# Patient Record
Sex: FEMALE | Race: WHITE | NOT HISPANIC OR LATINO | Employment: OTHER | URBAN - METROPOLITAN AREA
[De-identification: names, ages, dates, MRNs, and addresses within clinical notes are randomized per-mention and may not be internally consistent; named-entity substitution may affect disease eponyms.]

---

## 2023-01-10 ENCOUNTER — APPOINTMENT (OUTPATIENT)
Dept: MRI IMAGING | Facility: CLINIC | Age: 73
DRG: 542 | End: 2023-01-10
Attending: EMERGENCY MEDICINE
Payer: COMMERCIAL

## 2023-01-10 ENCOUNTER — HOSPITAL ENCOUNTER (INPATIENT)
Facility: CLINIC | Age: 73
LOS: 4 days | Discharge: HOME | DRG: 542 | End: 2023-01-14
Attending: EMERGENCY MEDICINE | Admitting: INTERNAL MEDICINE
Payer: COMMERCIAL

## 2023-01-10 ENCOUNTER — APPOINTMENT (OUTPATIENT)
Dept: CT IMAGING | Facility: CLINIC | Age: 73
DRG: 542 | End: 2023-01-10
Attending: EMERGENCY MEDICINE
Payer: COMMERCIAL

## 2023-01-10 DIAGNOSIS — S32.012A CLOSED UNSTABLE BURST FRACTURE OF FIRST LUMBAR VERTEBRA, INITIAL ENCOUNTER (H): ICD-10-CM
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
## 2023-01-10 LAB
ANION GAP SERPL CALCULATED.3IONS-SCNC: 13 MMOL/L (ref 7–15)
APTT PPP: 29 SECONDS (ref 22–38)
ATRIAL RATE - MUSE: 81 BPM
BASOPHILS # BLD AUTO: 0 10E3/UL (ref 0–0.2)
BASOPHILS NFR BLD AUTO: 0 %
BUN SERPL-MCNC: 25.5 MG/DL (ref 8–23)
CALCIUM SERPL-MCNC: 9.8 MG/DL (ref 8.8–10.2)
CHLORIDE SERPL-SCNC: 102 MMOL/L (ref 98–107)
CREAT SERPL-MCNC: 1.07 MG/DL (ref 0.51–0.95)
DEPRECATED HCO3 PLAS-SCNC: 26 MMOL/L (ref 22–29)
DIASTOLIC BLOOD PRESSURE - MUSE: NORMAL MMHG
EOSINOPHIL # BLD AUTO: 0 10E3/UL (ref 0–0.7)
EOSINOPHIL NFR BLD AUTO: 0 %
ERYTHROCYTE [DISTWIDTH] IN BLOOD BY AUTOMATED COUNT: 12.3 % (ref 10–15)
GFR SERPL CREATININE-BSD FRML MDRD: 55 ML/MIN/1.73M2
GLUCOSE SERPL-MCNC: 118 MG/DL (ref 70–99)
HCT VFR BLD AUTO: 37.5 % (ref 35–47)
HGB BLD-MCNC: 12.2 G/DL (ref 11.7–15.7)
HOLD SPECIMEN: NORMAL
IMM GRANULOCYTES # BLD: 0 10E3/UL
IMM GRANULOCYTES NFR BLD: 0 %
INR PPP: 1.02 (ref 0.85–1.15)
INTERPRETATION ECG - MUSE: NORMAL
LYMPHOCYTES # BLD AUTO: 1 10E3/UL (ref 0.8–5.3)
LYMPHOCYTES NFR BLD AUTO: 12 %
MCH RBC QN AUTO: 32.1 PG (ref 26.5–33)
MCHC RBC AUTO-ENTMCNC: 32.5 G/DL (ref 31.5–36.5)
MCV RBC AUTO: 99 FL (ref 78–100)
MONOCYTES # BLD AUTO: 0.7 10E3/UL (ref 0–1.3)
MONOCYTES NFR BLD AUTO: 8 %
NEUTROPHILS # BLD AUTO: 7.1 10E3/UL (ref 1.6–8.3)
NEUTROPHILS NFR BLD AUTO: 80 %
NRBC # BLD AUTO: 0 10E3/UL
NRBC BLD AUTO-RTO: 0 /100
P AXIS - MUSE: NORMAL DEGREES
PLATELET # BLD AUTO: 244 10E3/UL (ref 150–450)
POTASSIUM SERPL-SCNC: 3.9 MMOL/L (ref 3.4–5.3)
PR INTERVAL - MUSE: 146 MS
QRS DURATION - MUSE: 70 MS
QT - MUSE: 404 MS
QTC - MUSE: 469 MS
R AXIS - MUSE: -22 DEGREES
RBC # BLD AUTO: 3.8 10E6/UL (ref 3.8–5.2)
SODIUM SERPL-SCNC: 141 MMOL/L (ref 136–145)
SYSTOLIC BLOOD PRESSURE - MUSE: NORMAL MMHG
T AXIS - MUSE: 152 DEGREES
TROPONIN T SERPL HS-MCNC: 19 NG/L
VENTRICULAR RATE- MUSE: 81 BPM
WBC # BLD AUTO: 8.8 10E3/UL (ref 4–11)

## 2023-01-10 PROCEDURE — 85025 COMPLETE CBC W/AUTO DIFF WBC: CPT | Performed by: EMERGENCY MEDICINE

## 2023-01-10 PROCEDURE — 85610 PROTHROMBIN TIME: CPT | Performed by: EMERGENCY MEDICINE

## 2023-01-10 PROCEDURE — 96361 HYDRATE IV INFUSION ADD-ON: CPT

## 2023-01-10 PROCEDURE — 74177 CT ABD & PELVIS W/CONTRAST: CPT

## 2023-01-10 PROCEDURE — 258N000003 HC RX IP 258 OP 636: Performed by: EMERGENCY MEDICINE

## 2023-01-10 PROCEDURE — 36415 COLL VENOUS BLD VENIPUNCTURE: CPT | Performed by: EMERGENCY MEDICINE

## 2023-01-10 PROCEDURE — 250N000009 HC RX 250: Performed by: EMERGENCY MEDICINE

## 2023-01-10 PROCEDURE — 72131 CT LUMBAR SPINE W/O DYE: CPT

## 2023-01-10 PROCEDURE — 250N000011 HC RX IP 250 OP 636: Performed by: EMERGENCY MEDICINE

## 2023-01-10 PROCEDURE — 84484 ASSAY OF TROPONIN QUANT: CPT | Performed by: EMERGENCY MEDICINE

## 2023-01-10 PROCEDURE — 82374 ASSAY BLOOD CARBON DIOXIDE: CPT | Performed by: EMERGENCY MEDICINE

## 2023-01-10 PROCEDURE — 85730 THROMBOPLASTIN TIME PARTIAL: CPT | Performed by: EMERGENCY MEDICINE

## 2023-01-10 PROCEDURE — 250N000013 HC RX MED GY IP 250 OP 250 PS 637: Performed by: EMERGENCY MEDICINE

## 2023-01-10 PROCEDURE — G1010 CDSM STANSON: HCPCS

## 2023-01-10 PROCEDURE — 82947 ASSAY GLUCOSE BLOOD QUANT: CPT | Performed by: EMERGENCY MEDICINE

## 2023-01-10 PROCEDURE — 250N000011 HC RX IP 250 OP 636

## 2023-01-10 PROCEDURE — 72125 CT NECK SPINE W/O DYE: CPT

## 2023-01-10 PROCEDURE — 99285 EMERGENCY DEPT VISIT HI MDM: CPT | Mod: 25

## 2023-01-10 PROCEDURE — 70450 CT HEAD/BRAIN W/O DYE: CPT

## 2023-01-10 PROCEDURE — 120N000001 HC R&B MED SURG/OB

## 2023-01-10 PROCEDURE — 96374 THER/PROPH/DIAG INJ IV PUSH: CPT | Mod: 59

## 2023-01-10 PROCEDURE — 93005 ELECTROCARDIOGRAM TRACING: CPT

## 2023-01-10 PROCEDURE — C9803 HOPD COVID-19 SPEC COLLECT: HCPCS

## 2023-01-10 RX ORDER — LORAZEPAM 2 MG/ML
0.5 INJECTION INTRAMUSCULAR ONCE
Status: COMPLETED | OUTPATIENT
Start: 2023-01-10 | End: 2023-01-10

## 2023-01-10 RX ORDER — SODIUM CHLORIDE 9 MG/ML
INJECTION, SOLUTION INTRAVENOUS CONTINUOUS
Status: DISCONTINUED | OUTPATIENT
Start: 2023-01-10 | End: 2023-01-13

## 2023-01-10 RX ORDER — LORAZEPAM 2 MG/ML
INJECTION INTRAMUSCULAR
Status: COMPLETED
Start: 2023-01-10 | End: 2023-01-10

## 2023-01-10 RX ORDER — LEVOTHYROXINE SODIUM 112 UG/1
112 TABLET ORAL DAILY
COMMUNITY
Start: 2022-02-07

## 2023-01-10 RX ORDER — QUETIAPINE FUMARATE 25 MG/1
25 TABLET, FILM COATED ORAL ONCE
Status: COMPLETED | OUTPATIENT
Start: 2023-01-10 | End: 2023-01-10

## 2023-01-10 RX ORDER — QUETIAPINE FUMARATE 25 MG/1
25 TABLET, FILM COATED ORAL EVERY MORNING
Status: ON HOLD | COMMUNITY
End: 2023-01-14

## 2023-01-10 RX ORDER — IOPAMIDOL 755 MG/ML
500 INJECTION, SOLUTION INTRAVASCULAR ONCE
Status: COMPLETED | OUTPATIENT
Start: 2023-01-10 | End: 2023-01-10

## 2023-01-10 RX ADMIN — SODIUM CHLORIDE: 9 INJECTION, SOLUTION INTRAVENOUS at 23:54

## 2023-01-10 RX ADMIN — SODIUM CHLORIDE 56 ML: 9 INJECTION, SOLUTION INTRAVENOUS at 16:58

## 2023-01-10 RX ADMIN — LORAZEPAM 0.5 MG: 2 INJECTION INTRAMUSCULAR; INTRAVENOUS at 19:28

## 2023-01-10 RX ADMIN — SODIUM CHLORIDE 1000 ML: 9 INJECTION, SOLUTION INTRAVENOUS at 15:00

## 2023-01-10 RX ADMIN — IOPAMIDOL 65 ML: 755 INJECTION, SOLUTION INTRAVENOUS at 16:58

## 2023-01-10 RX ADMIN — LORAZEPAM 0.5 MG: 2 INJECTION INTRAMUSCULAR at 19:49

## 2023-01-10 RX ADMIN — QUETIAPINE FUMARATE 25 MG: 25 TABLET ORAL at 18:42

## 2023-01-10 RX ADMIN — LORAZEPAM 0.5 MG: 2 INJECTION INTRAMUSCULAR; INTRAVENOUS at 19:49

## 2023-01-10 ASSESSMENT — ACTIVITIES OF DAILY LIVING (ADL)
ADLS_ACUITY_SCORE: 35

## 2023-01-10 ASSESSMENT — ENCOUNTER SYMPTOMS
NUMBNESS: 0
CONFUSION: 1
ABDOMINAL PAIN: 0
WEAKNESS: 0
BACK PAIN: 1

## 2023-01-10 NOTE — ED NOTES
Rapid Assessment Note    History:   The patient's  reports that 4 days ago the patient suffered a fall in their bathroom that he did not witness. He states that the patient mentioned to him that she had fallen and that she felt sore. He explains that then this morning he went into their bedroom where he had seen the patient sleeping yesterday night and found her on the floor alongside of the bed and notes that she was incontinent of urine. He denies any use of a blood thinner in the patient. He reports a history of dementia in the patient. The patient complains of pain in her lower back bilaterally. She does not recall suffering any falls. She denies shortness of breath, difficulty walking, or chest pain.     Exam:       HEENT:   No scalp hematoma or defect to the bony calvarium.      Kerns's and Racoon's sign negative.        Oropharynx is moist, without lesions or trismus.  EYES:  Conjunctiva normal,  NECK:   C-spine non-tender     No bony step-off to cervical spine.   CV:    Regular rate and rhythm.     No murmurs, rubs or gallops.    PULM:  No respiratory distress.      No stridor  ABD:   Soft, non-tender, non-distended.      No rebound or guarding.  MSK:    No focal bony tenderness to the extremities.      Mild tenderness to the low back  LYMPH:  No cervical lymphadenopathy.  NEURO:  Alert    CN II-XII intact, speech is clear with no aphasia.      Normal muscular tone, no tremor.  SKIN:   Warm, dry and intact.    PSYCH:   Mood is good and affect is appropriate.      Plan of Care:   I evaluated the patient and developed an initial plan of care. I discussed this plan and explained that I, or one of my partners, would be returning to complete the evaluation.     I, Mynor Calderon, am serving as a scribe to document services personally performed by Artemio Rahman MD, based on my observations and the provider's statements to me.    11/5/2018  [employer]    Portions of this medical record were completed by a scribe. UPON MY REVIEW AND AUTHENTICATION BY ELECTRONIC SIGNATURE, this confirms (a) I performed the applicable clinical services, and (b) the record is accurate.      Artemoi Rahman MD  01/10/23 0457

## 2023-01-10 NOTE — ED TRIAGE NOTES
Pt presents for complaint of a fall that occurred on Friday and last night.  who acts as care giver states the fall Friday he was unaware of until informed by the patient. This morning he found the patient lying on the floor next to the bed unable to get up. Hx of dementia. Denies blood thinners. Pt complains of back pain. Alert, Oriented to self. ABC intact.     Triage Assessment     Row Name 01/10/23 5724       Triage Assessment (Adult)    Airway WDL WDL       Respiratory WDL    Respiratory WDL WDL       Skin Circulation/Temperature WDL    Skin Circulation/Temperature WDL WDL       Cardiac WDL    Cardiac WDL WDL       Peripheral/Neurovascular WDL    Peripheral Neurovascular WDL WDL       Cognitive/Neuro/Behavioral WDL    Cognitive/Neuro/Behavioral WDL WDL

## 2023-01-10 NOTE — ED PROVIDER NOTES
History     Chief Complaint:  Fall       HPI   Elsa Jcakson is a 72 year old female who presents for evaluation after a fall. The patient's  reports that 4 days ago the patient suffered a fall in the bathroom that he did not witness. He states that the patient told him that she had fallen and she felt sore in the lower back. He then explains this morning he found her unresponsive and confused on the floor next to the bed and also incontinent of urine, which is abnormal for her. He then reports that it took the patient a few minutes to regain responsiveness. The patient currently denies pain in the abdominal area or ribs. No new numbness or weakness. The patient affirms that she is not on any blood thinners. The patient also claims she has had no recent sicknesses. Of note, the patient takes Seroquel and the last dose was the morning.    Independent Historian: Patient and patient's      Review of External Notes: I reviewed the patient's Care Everywhere and Chart Review     ROS:  Review of Systems   Gastrointestinal: Negative for abdominal pain.   Genitourinary:        (+) incontinent of urine, since resolved   Musculoskeletal: Positive for back pain.   Neurological: Negative for weakness and numbness.   Psychiatric/Behavioral: Positive for confusion (since resolved).   All other systems reviewed and are negative.    Allergies:  No Known Allergies     Medications:    Levothyroxine  Seroquel    Past Medical History:    Synovial cyst of lumbar facet joint  Colon polyp  Osteoporosis  Hypothyroidism  Dorsopathy   Carpal tunnel syndrome  Osteoarthritis    Past Surgical History:    Heart disease - father  Hypertension - mother     Family History:    Heart disease - father  Hypertension - mother    Social History:  Patient came from home.  Patient is accompanied in the ED by her spouse.       Physical Exam     Patient Vitals for the past 24 hrs:   BP Temp Pulse Resp SpO2 Height Weight   01/10/23 1836 (!)  "144/86 -- 82 18 97 % -- --   01/10/23 1821 -- -- 85 17 98 % -- --   01/10/23 1806 -- -- 80 15 97 % -- --   01/10/23 1800 (!) 152/91 -- 82 14 -- -- --   01/10/23 1721 -- -- -- -- 99 % -- --   01/10/23 1706 (!) 169/91 -- -- -- -- -- --   01/10/23 1154 115/81 98  F (36.7  C) 97 16 98 % 1.702 m (5' 7\") 59 kg (130 lb)        Physical Exam  Constitutional:       General: She is not in acute distress.     Appearance: She is not diaphoretic.   HENT:      Head: Atraumatic.      Mouth/Throat:      Pharynx: No oropharyngeal exudate.   Eyes:      General: No scleral icterus.     Pupils: Pupils are equal, round, and reactive to light.   Neck:      Comments: No midline C-spine tenderness  Cardiovascular:      Rate and Rhythm: Normal rate and regular rhythm.      Heart sounds: Normal heart sounds.   Pulmonary:      Effort: No respiratory distress.      Breath sounds: Normal breath sounds.   Abdominal:      General: Bowel sounds are normal.      Palpations: Abdomen is soft.      Tenderness: There is no abdominal tenderness.   Musculoskeletal:      Comments: No thoracic tenderness.  There is mild upper lumbar tenderness without definite step-off.   Skin:     General: Skin is warm.      Capillary Refill: Capillary refill takes less than 2 seconds.      Findings: No rash.   Neurological:      Comments: The patient is alert and oriented to person.  She was ambulating into the exam room.  Strength 5 out of 5 and equal bilaterally in all extremities.  Sensation light touch preserved over all extremities.   Psychiatric:      Comments: Cooperative           Emergency Department Course   ECG  ECG obtained at 1225, ECG read at 1920  Normal sinus rhythm  Low voltage QRS  Nonspecific ST wave abnormality  Abnormal ECG  Rate 81 bpm. DC interval 146 ms. QRS duration 70 ms. QT/QTc 404/469 ms. P-R-T axes * -22 152.    Imaging:  Thoracic spine MRI w/o contrast   Final Result   IMPRESSION:   1.  Limited examination secondary to patient cooperation. "   2.  Acute L1 burst type fracture. By definition this fracture is unstable.   3.  Chronic T8 compression fracture.      CT Chest/Abdomen/Pelvis w Contrast   Final Result   IMPRESSION:   1.  Acute fracture of L1 with greater than 50% central height loss and some retropulsion of posterior fragments with resultant spinal canal narrowing. No definite extension into the posterior elements.   2.  Additional compression deformities of T8 and L3, favor chronic but recommend correlation with point tenderness.   3.  No additional evidence of acute trauma in the chest, abdomen or pelvis.      CT Cervical Spine w/o Contrast   Final Result   IMPRESSION:   1.  No fracture or posttraumatic subluxation.   2.  No high-grade spinal canal or neural foraminal stenosis.      Lumbar spine CT w/o contrast   Final Result   IMPRESSION:     1. Acute L1 burst fracture with fragment retropulsion contributing to   mild spinal canal stenosis.   2. Old L3 compression fracture with mild height loss.   3. Multilevel degenerative change.   4. No high-grade stenoses.   5. Nonspecific fluid attenuation along the right hemidiaphragm with   multiple small foci of gas, potentially posttraumatic   paraspinal/diaphragmatic hematoma or complex pleural fluid collection.   Infection not excluded. Chest CT correlation could be considered.      JESSICA BARRAGAN MD            SYSTEM ID:  [id number]      Head CT w/o contrast   Final Result   IMPRESSION: No acute intracranial abnormality.      JESSICA BARRAGAN MD            SYSTEM ID:  [id number]      Lumbar spine MRI w/o contrast    (Results Pending)      Report per radiology    Laboratory:  Labs Ordered and Resulted from Time of ED Arrival to Time of ED Departure   BASIC METABOLIC PANEL - Abnormal       Result Value    Sodium 141      Potassium 3.9      Chloride 102      Carbon Dioxide (CO2) 26      Anion Gap 13      Urea Nitrogen 25.5 (*)     Creatinine 1.07 (*)     Calcium 9.8      Glucose 118 (*)     GFR  Estimate 55 (*)    TROPONIN T, HIGH SENSITIVITY - Abnormal    Troponin T, High Sensitivity 19 (*)    INR - Normal    INR 1.02     PARTIAL THROMBOPLASTIN TIME - Normal    aPTT 29     CBC WITH PLATELETS AND DIFFERENTIAL    WBC Count 8.8      RBC Count 3.80      Hemoglobin 12.2      Hematocrit 37.5      MCV 99      MCH 32.1      MCHC 32.5      RDW 12.3      Platelet Count 244      % Neutrophils 80      % Lymphocytes 12      % Monocytes 8      % Eosinophils 0      % Basophils 0      % Immature Granulocytes 0      NRBCs per 100 WBC 0      Absolute Neutrophils 7.1      Absolute Lymphocytes 1.0      Absolute Monocytes 0.7      Absolute Eosinophils 0.0      Absolute Basophils 0.0      Absolute Immature Granulocytes 0.0      Absolute NRBCs 0.0     ROUTINE UA WITH MICROSCOPIC REFLEX TO CULTURE        Emergency Department Course & Assessments:         Interventions:  1500 NS 2 L IV  1842 Seroquel 25 mg PO  1928 Ativan 0.5 mg IV  1949 Ativan 0.5 mg IV    Consultations/Discussion of Management or Tests:  1630 I obtained history and examined the patient as noted above.  1837 I rechecked and updated the patient.    2154 I spoke with Suad Caldwell PA-C with neurosurgery about the patient's history and plan of care.   2216 I spoke with Suad Caldwell PA-C with neurosurgery again to receive an update. She recommended that the patient should be admitted here.          Disposition:  The patient was admitted to the hospital under the care of Dr. Vasquez.     Impression & Plan      Medical Decision Making:  This patient is a 72-year-old woman who presents following a fall.  She has dementia and is not able to provide much history herself.  Her  provides history though.  She complains of pain in her low back.  She had episode of urinary incontinence this morning but otherwise does not have any focal neurologic deficit.  CT scan concerning for an unstable burst fracture at L1.  There is also concern for possible pleural fluid.   Partial trauma activation was called.  CT scan of the head and C-spine negative.  CT scan of the chest, abdomen, and pelvis do not show any other traumatic sequelae.    The patient ultimately needed MRI to evaluate the spinal cord for possible compression.  She was pretreated with Seroquel which she uses at home.  She poorly tolerated the MRI.  She required 2 doses of lorazepam in order to facilitate the imaging that was obtained.  This was limited but we were able to get good visualization of the fracture site.  This was discussed with neurosurgery who did not feel that the patient is unstable to the point that she required emergent surgery.  She will be kept on spinal precautions tonight.  She will have a brace applied tomorrow and formal neurosurgery consultation.        Diagnosis:    ICD-10-CM    1. Closed unstable burst fracture of first lumbar vertebra, initial encounter (H)  S32.012A       2. Fall, initial encounter  W19.XXXA               Scribe Disclosure:  I, Stanislaw Mariano, am serving as a scribe at 4:39 PM on 1/10/2023 to document services personally performed by Geo Godinez MD based on my observations and the provider's statements to me.     1/10/2023   Geo Godinez MD McRoberts, Sean Edward, MD  01/10/23 0499

## 2023-01-11 ENCOUNTER — APPOINTMENT (OUTPATIENT)
Dept: GENERAL RADIOLOGY | Facility: CLINIC | Age: 73
DRG: 542 | End: 2023-01-11
Attending: PHYSICIAN ASSISTANT
Payer: COMMERCIAL

## 2023-01-11 LAB
ALBUMIN UR-MCNC: NEGATIVE MG/DL
APPEARANCE UR: CLEAR
BILIRUB UR QL STRIP: NEGATIVE
COLOR UR AUTO: ABNORMAL
GLUCOSE UR STRIP-MCNC: NEGATIVE MG/DL
HGB UR QL STRIP: NEGATIVE
KETONES UR STRIP-MCNC: 20 MG/DL
LEUKOCYTE ESTERASE UR QL STRIP: NEGATIVE
NITRATE UR QL: NEGATIVE
PH UR STRIP: 6.5 [PH] (ref 5–7)
RBC URINE: 1 /HPF
SARS-COV-2 RNA RESP QL NAA+PROBE: NEGATIVE
SP GR UR STRIP: 1.01 (ref 1–1.03)
SQUAMOUS EPITHELIAL: <1 /HPF
TROPONIN T SERPL HS-MCNC: 16 NG/L
TROPONIN T SERPL HS-MCNC: 17 NG/L
UROBILINOGEN UR STRIP-MCNC: NORMAL MG/DL
WBC URINE: <1 /HPF

## 2023-01-11 PROCEDURE — 36415 COLL VENOUS BLD VENIPUNCTURE: CPT | Performed by: INTERNAL MEDICINE

## 2023-01-11 PROCEDURE — 99222 1ST HOSP IP/OBS MODERATE 55: CPT | Performed by: PHYSICIAN ASSISTANT

## 2023-01-11 PROCEDURE — 84484 ASSAY OF TROPONIN QUANT: CPT | Performed by: INTERNAL MEDICINE

## 2023-01-11 PROCEDURE — U0005 INFEC AGEN DETEC AMPLI PROBE: HCPCS | Performed by: INTERNAL MEDICINE

## 2023-01-11 PROCEDURE — 81001 URINALYSIS AUTO W/SCOPE: CPT | Performed by: EMERGENCY MEDICINE

## 2023-01-11 PROCEDURE — 99223 1ST HOSP IP/OBS HIGH 75: CPT | Mod: AI | Performed by: INTERNAL MEDICINE

## 2023-01-11 PROCEDURE — 258N000003 HC RX IP 258 OP 636: Performed by: EMERGENCY MEDICINE

## 2023-01-11 PROCEDURE — 120N000001 HC R&B MED SURG/OB

## 2023-01-11 PROCEDURE — 250N000013 HC RX MED GY IP 250 OP 250 PS 637: Performed by: HOSPITALIST

## 2023-01-11 PROCEDURE — 99207 PR NO BILLABLE SERVICE THIS VISIT: CPT | Performed by: INTERNAL MEDICINE

## 2023-01-11 PROCEDURE — 72100 X-RAY EXAM L-S SPINE 2/3 VWS: CPT

## 2023-01-11 PROCEDURE — 250N000013 HC RX MED GY IP 250 OP 250 PS 637: Performed by: INTERNAL MEDICINE

## 2023-01-11 PROCEDURE — L0464 TLSO 4MOD SACRO-SCAP PRE: HCPCS

## 2023-01-11 RX ORDER — LEVOTHYROXINE SODIUM 112 UG/1
112 TABLET ORAL DAILY
Status: DISCONTINUED | OUTPATIENT
Start: 2023-01-11 | End: 2023-01-14 | Stop reason: HOSPADM

## 2023-01-11 RX ORDER — KETOROLAC TROMETHAMINE 15 MG/ML
15 INJECTION, SOLUTION INTRAMUSCULAR; INTRAVENOUS EVERY 6 HOURS PRN
Status: DISCONTINUED | OUTPATIENT
Start: 2023-01-11 | End: 2023-01-14 | Stop reason: HOSPADM

## 2023-01-11 RX ORDER — HALOPERIDOL 5 MG/ML
2 INJECTION INTRAMUSCULAR EVERY 6 HOURS PRN
Status: DISCONTINUED | OUTPATIENT
Start: 2023-01-11 | End: 2023-01-14 | Stop reason: HOSPADM

## 2023-01-11 RX ORDER — QUETIAPINE FUMARATE 25 MG/1
25 TABLET, FILM COATED ORAL EVERY MORNING
Status: DISCONTINUED | OUTPATIENT
Start: 2023-01-11 | End: 2023-01-13

## 2023-01-11 RX ORDER — LIDOCAINE 40 MG/G
CREAM TOPICAL
Status: DISCONTINUED | OUTPATIENT
Start: 2023-01-11 | End: 2023-01-14 | Stop reason: HOSPADM

## 2023-01-11 RX ORDER — ACETAMINOPHEN 325 MG/1
650 TABLET ORAL EVERY 6 HOURS PRN
Status: DISCONTINUED | OUTPATIENT
Start: 2023-01-11 | End: 2023-01-14 | Stop reason: HOSPADM

## 2023-01-11 RX ORDER — LORAZEPAM 2 MG/ML
0.5 INJECTION INTRAMUSCULAR EVERY 6 HOURS PRN
Status: DISCONTINUED | OUTPATIENT
Start: 2023-01-11 | End: 2023-01-14 | Stop reason: HOSPADM

## 2023-01-11 RX ORDER — LIDOCAINE 4 G/G
1 PATCH TOPICAL
Status: DISCONTINUED | OUTPATIENT
Start: 2023-01-11 | End: 2023-01-14 | Stop reason: HOSPADM

## 2023-01-11 RX ORDER — ACETAMINOPHEN 650 MG/1
650 SUPPOSITORY RECTAL EVERY 6 HOURS PRN
Status: DISCONTINUED | OUTPATIENT
Start: 2023-01-11 | End: 2023-01-14 | Stop reason: HOSPADM

## 2023-01-11 RX ORDER — AMLODIPINE BESYLATE 5 MG/1
5 TABLET ORAL DAILY
Status: DISCONTINUED | OUTPATIENT
Start: 2023-01-11 | End: 2023-01-14 | Stop reason: HOSPADM

## 2023-01-11 RX ADMIN — QUETIAPINE FUMARATE 25 MG: 25 TABLET ORAL at 09:18

## 2023-01-11 RX ADMIN — LIDOCAINE 1 PATCH: 246 PATCH TOPICAL at 09:18

## 2023-01-11 RX ADMIN — LEVOTHYROXINE SODIUM 112 MCG: 0.11 TABLET ORAL at 09:22

## 2023-01-11 RX ADMIN — AMLODIPINE BESYLATE 5 MG: 5 TABLET ORAL at 20:04

## 2023-01-11 RX ADMIN — SODIUM CHLORIDE: 9 INJECTION, SOLUTION INTRAVENOUS at 09:17

## 2023-01-11 ASSESSMENT — ACTIVITIES OF DAILY LIVING (ADL)
ADLS_ACUITY_SCORE: 46
ADLS_ACUITY_SCORE: 47
ADLS_ACUITY_SCORE: 38
ADLS_ACUITY_SCORE: 46
DOING_ERRANDS_INDEPENDENTLY_DIFFICULTY: YES
CHANGE_IN_FUNCTIONAL_STATUS_SINCE_ONSET_OF_CURRENT_ILLNESS/INJURY: YES
ADLS_ACUITY_SCORE: 47
ADLS_ACUITY_SCORE: 47
FALL_HISTORY_WITHIN_LAST_SIX_MONTHS: YES
ADLS_ACUITY_SCORE: 47
BATHING: 1-->ASSISTANCE NEEDED
DRESS: 1-->ASSISTANCE (EQUIPMENT/PERSON) NEEDED
DRESSING/BATHING_DIFFICULTY: YES
ADLS_ACUITY_SCORE: 47
ADLS_ACUITY_SCORE: 47
DRESS: 1-->ASSISTANCE (EQUIPMENT/PERSON) NEEDED (NOT DEVELOPMENTALLY APPROPRIATE)
ADLS_ACUITY_SCORE: 48
ADLS_ACUITY_SCORE: 47
WALKING_OR_CLIMBING_STAIRS_DIFFICULTY: YES
DIFFICULTY_EATING/SWALLOWING: NO
TRANSFERRING: 1-->ASSISTANCE (EQUIPMENT/PERSON) NEEDED
WALKING_OR_CLIMBING_STAIRS: AMBULATION DIFFICULTY, REQUIRES EQUIPMENT;AMBULATION DIFFICULTY, ASSISTANCE 1 PERSON;STAIR CLIMBING DIFFICULTY, REQUIRES EQUIPMENT;STAIR CLIMBING DIFFICULTY, ASSISTANCE 1 PERSON;TRANSFERRING DIFFICULTY, REQUIRES EQUIPMENT;TRANSFERRING DIFFICULTY, ASSISTANCE 1 PERSON
TRANSFERRING: 1-->ASSISTANCE (EQUIPMENT/PERSON) NEEDED (NOT DEVELOPMENTALLY APPROPRIATE)
DRESSING/BATHING: BATHING DIFFICULTY, ASSISTANCE 1 PERSON;DRESSING DIFFICULTY, ASSISTANCE 1 PERSON
ADLS_ACUITY_SCORE: 35
CONCENTRATING,_REMEMBERING_OR_MAKING_DECISIONS_DIFFICULTY: YES

## 2023-01-11 NOTE — PROGRESS NOTES
Contacted regarding 73 yo female presenting to ER after fall 4 days ago and second fall today with complaint of back pain. Neuro intact per ER.  Imaging reveals L1 fracture.    Imaging:    CT lumbar spine:  FINDINGS: Acute L1 burst fracture with fragment retropulsion  contributing to mild spinal canal stenosis. No pedicle or posterior  element extension is identified.     Chronic-appearing L3 compression fracture with mild height loss and  superior endplate Schmorl's node.     No other fractures are identified. Multilevel mild degenerative disc  disease. Moderate to severe lower lumbar spine facet arthropathy.  Grade 1 anterolisthesis of L5 on S1. No high grade spinal canal or  foraminal stenoses. Mild spinal canal stenosis at L4-L5.     Bilateral sacroiliac joint degenerative change. Sigmoid  diverticulosis. Scattered vascular calcifications.     Nonspecific fluid attenuation along the right hemidiaphragm with  multiple small foci of gas.                                                                      IMPRESSION:    1. Acute L1 burst fracture with fragment retropulsion contributing to  mild spinal canal stenosis.  2. Old L3 compression fracture with mild height loss.  3. Multilevel degenerative change.  4. No high-grade stenoses.  5. Nonspecific fluid attenuation along the right hemidiaphragm with  multiple small foci of gas, potentially posttraumatic  paraspinal/diaphragmatic hematoma or complex pleural fluid collection.  Infection not excluded. Chest CT correlation could be considered.     MRI thoracic spine:  FINDINGS:   Normal alignment of the thoracic spine. There is acute burst type fracture at L1 with involvement of the anterior vertebral margin, posterior vertebral margin, superior endplate, and inferior endplate. There is 5 mm retropulsion. No associated spinal   canal or neural foraminal stenosis. No identified epidural hematoma. There is chronic superior endplate compression fracture at T8. Normal  disc heights. No herniation. Normal facets. No spinal canal or neural foraminal stenosis. No abnormal cord signal.      No extraspinal abnormality.                                                                      IMPRESSION:  1.  Limited examination secondary to patient cooperation.  2.  Acute L1 burst type fracture. By definition this fracture is unstable.  3.  Chronic T8 compression fracture.    RECOMMENDATIONS:  No immediate operative NS intervention indicated.  Admit at [facility].  Orthotics consult for TLSO.  Bedrest until brace arrives.  Upright lumbar x-ray's in brace.  NS will see in consultation tomorrow.    Discussed with Dr. Youngblood.    Suad Caldwell, ANGEL LUISS  [facility]  [address] [address]    Tel [phone]  Pager [phone]

## 2023-01-11 NOTE — H&P
Admitted: 01/10/2023    CHIEF COMPLAINT:  Fall, low back pain.    HISTORY OF PRESENT ILLNESS:  Mainly obtained from the ER records.  I did attempt to get in touch with her , however, I was unsuccessful.  The patient is quite sedated. This is a 72-year-old white female with a history of dementia that is thought to be either Lewy body or frontotemporal, on Seroquel, hypothyroidism, who was brought in by her  for a fall.  The patient apparently fell 4 days ago in the bathroom that he did not witness.  She had some soreness in the lower back.  Then, this morning she was found to be unresponsive and confused on the floor next to the bed and incontinent of urine, which is abnormal for her.  It took a few minutes for her to regain responsiveness.    PAST MEDICAL HISTORY:  Significant for osteoporosis, hypothyroidism, carpal tunnel syndrome.    FAMILY HISTORY:  Significant for heart disease in her father.    SOCIAL HISTORY:  She lives at home with her .  Unable to obtain further collateral information as the patient is quite sedated.    ALLERGIES:  NO KNOWN DRUG ALLERGIES.    MEDICATIONS:  Levothyroxine, Seroquel.    REVIEW OF SYSTEMS:  Unable to obtain.  The patient is quite sedated, having received 1 mg of Ativan and Seroquel.    PHYSICAL EXAMINATION:    VITAL SIGNS:  Temperature is 98, pulse 82, blood pressure is 144/86, respiratory rate 18, O2 sat is 97%.  GENERAL:  She is sedated.  HEENT:  Her pupils are reactive to light.  LUNGS:  Clear to auscultation anteriorly bilaterally.  CARDIAC:  Regular rate.  S1, S2 normal.  No murmurs or gallops.  ABDOMEN:  Soft, nontender, nondistended.  Hypoactive bowel sounds.  EXTREMITIES:  There is no edema.   NEUROLOGICAL:  Unable to perform as she is heavily sedated.    LABORATORY DATA:  Obtained on her shows the following:  A basic metabolic panel is grossly unremarkable other than a BUN of 25.5 and a creatinine of 1.07.  Her troponin was 19.  A CBC with diff is  grossly unremarkable.  Her INR is 1.02.  She had an EKG, which shows normal sinus rhythm at 81 beats per minute with low voltage QRS and nonspecific T-wave abnormality.  She had numerous imaging studies to include CT of the head without contrast, which showed no acute intracranial abnormality.  CT of the L-spine, which showed an acute L1 burst fracture with fragment retropulsion contributing to mild spinal canal stenosis, old L3 compression fracture with mild height loss, nonspecific fluid attenuation along the right hemidiaphragm with multiple small foci of gas, potential posttraumatic paraspinal or diaphragmatic hematoma, complex pleural fluid collection, infection not excluded.  Chest CT could be considered.  CT of the C-spine, which showed no fracture or subluxation.  CT of the chest, abdomen and pelvis with contrast, which showed acute fracture of L1 with greater than 50% central height loss and some retropulsion of posterior fragments with resultant spinal canal narrowing.  No definite extension into the posterior elements. Additional compression deformities of T8 and L3 favor chronic, but recommend correlation with point tenderness.  No additional evidence of acute trauma in the chest, abdomen and pelvis. MRI of the T spine, which showed acute L1 burst type fracture by definition, this fracture is unstable, chronic T8 compression fracture.    IMPRESSION AND PLAN:    1.  Fall.  Circumstances unknown with an acute L1 burst type fracture.  Neurosurgery will be consulted.  She will be placed on bed rest.  We will use pain medications for pain relief.  Orthopedics will be consulted.  2.  Dementia, complicates the situation given possible reaction to narcotics. Would recommend a sitter.  Fall precautions.  We will use topical as well as oral analgesics.    CODE STATUS:  By default will be FULL CODE until we can get in touch with her .    DISPOSITION:  She will be admitted as an inpatient.    Jarad Vasquez,  MD        D: 01/10/2023   T: 2023   MT: TAMMY    Name:     DOTTIE CHUA  MRN:      [id number]        Account:     [id number]   :      1950           Admitted:    01/10/2023       Document: [id number]

## 2023-01-11 NOTE — PHARMACY-ADMISSION MEDICATION HISTORY
Admission medication history interview status for this patient is complete. See [facility] admission navigator for allergy information, prior to admission medications and immunization status.     Medication history interview done, indicate source(s): Family ()  Medication history resources (including written lists, pill bottles, clinic record):Epic    Changes made to PTA medication list:  Added: all    Prior to Admission medications    Medication Sig Last Dose Taking? Auth Provider Long Term End Date   levothyroxine (SYNTHROID/LEVOTHROID) 112 MCG tablet Take 112 mcg by mouth daily 1/10/2023 at am Yes Unknown, Entered By History Yes    QUEtiapine (SEROQUEL) 25 MG tablet Take 25 mg by mouth every morning 1/10/2023 at am Yes Unknown, Entered By History

## 2023-01-11 NOTE — ED NOTES
[facility]  ED Nurse Handoff Report    Elsa Jackson is a 72 year old female   ED Chief complaint: Fall  . ED Diagnosis:   Final diagnoses:   None     Allergies: No Known Allergies    Code Status: Full Code  Activity level - Baseline/Home:  Independent. Activity Level - Current:   Assist X 1. Lift room needed: No. Bariatric: No   Needed: No   Isolation: No. Infection: Not Applicable.     Vital Signs:   Vitals:    01/10/23 1800 01/10/23 1806 01/10/23 1821 01/10/23 1836   BP: (!) 152/91   (!) 144/86   Pulse: 82 80 85 82   Resp: 14 15 17 18   Temp:       SpO2:  97% 98% 97%   Weight:       Height:           Cardiac Rhythm:  ,      Pain level:    Patient confused: Yes. Patient Falls Risk: Yes.   Elimination Status: Has voided   Patient Report - Initial Complaint: Fall, Back pain. Focused Assessment: .The patient's  reports that 4 days ago the patient suffered a fall in their bathroom that he did not witness. He states that the patient mentioned to him that she had fallen and that she felt sore. He explains that then this morning he went into their bedroom where he had seen the patient sleeping yesterday night and found her on the floor alongside of the bed and notes that she was incontinent of urine. He denies any use of a blood thinner in the patient. He reports a history of dementia in the patient. The patient complains of pain in her lower back bilaterally. She does not recall suffering any falls. She denies shortness of breath, difficulty walking, or chest pain.      Exam:         HEENT:           No scalp hematoma or defect to the bony calvarium.                            Kerns's and Racoon's sign negative.                               Oropharynx is moist, without lesions or trismus.  EYES:  Conjunctiva normal,  NECK:             C-spine non-tender                           No bony step-off to cervical spine.   CV:                  Regular rate and rhythm.                            No murmurs, rubs or gallops.    PULM:             No respiratory distress.                            No stridor  ABD:               Soft, non-tender, non-distended.                            No rebound or guarding.  MSK:               No focal bony tenderness to the extremities.                            Mild tenderness to the low back  LYMPH:          No cervical lymphadenopathy.  NEURO:          Alert                          CN II-XII intact, speech is clear with no aphasia.                            Normal muscular tone, no tremor.  SKIN:               Warm, dry and intact.    PSYCH:           Mood is good and affect is appropriate   Tests Performed: labs, imaging. Abnormal Results:   Labs Ordered and Resulted from Time of ED Arrival to Time of ED Departure   BASIC METABOLIC PANEL - Abnormal       Result Value    Sodium 141      Potassium 3.9      Chloride 102      Carbon Dioxide (CO2) 26      Anion Gap 13      Urea Nitrogen 25.5 (*)     Creatinine 1.07 (*)     Calcium 9.8      Glucose 118 (*)     GFR Estimate 55 (*)    TROPONIN T, HIGH SENSITIVITY - Abnormal    Troponin T, High Sensitivity 19 (*)    INR - Normal    INR 1.02     PARTIAL THROMBOPLASTIN TIME - Normal    aPTT 29     CBC WITH PLATELETS AND DIFFERENTIAL    WBC Count 8.8      RBC Count 3.80      Hemoglobin 12.2      Hematocrit 37.5      MCV 99      MCH 32.1      MCHC 32.5      RDW 12.3      Platelet Count 244      % Neutrophils 80      % Lymphocytes 12      % Monocytes 8      % Eosinophils 0      % Basophils 0      % Immature Granulocytes 0      NRBCs per 100 WBC 0      Absolute Neutrophils 7.1      Absolute Lymphocytes 1.0      Absolute Monocytes 0.7      Absolute Eosinophils 0.0      Absolute Basophils 0.0      Absolute Immature Granulocytes 0.0      Absolute NRBCs 0.0     ROUTINE UA WITH MICROSCOPIC REFLEX TO CULTURE     Thoracic spine MRI w/o contrast   Final Result   IMPRESSION:   1.  Limited examination secondary to patient cooperation.   2.   Acute L1 burst type fracture. By definition this fracture is unstable.   3.  Chronic T8 compression fracture.      CT Chest/Abdomen/Pelvis w Contrast   Final Result   IMPRESSION:   1.  Acute fracture of L1 with greater than 50% central height loss and some retropulsion of posterior fragments with resultant spinal canal narrowing. No definite extension into the posterior elements.   2.  Additional compression deformities of T8 and L3, favor chronic but recommend correlation with point tenderness.   3.  No additional evidence of acute trauma in the chest, abdomen or pelvis.      CT Cervical Spine w/o Contrast   Final Result   IMPRESSION:   1.  No fracture or posttraumatic subluxation.   2.  No high-grade spinal canal or neural foraminal stenosis.      Lumbar spine CT w/o contrast   Final Result   IMPRESSION:     1. Acute L1 burst fracture with fragment retropulsion contributing to   mild spinal canal stenosis.   2. Old L3 compression fracture with mild height loss.   3. Multilevel degenerative change.   4. No high-grade stenoses.   5. Nonspecific fluid attenuation along the right hemidiaphragm with   multiple small foci of gas, potentially posttraumatic   paraspinal/diaphragmatic hematoma or complex pleural fluid collection.   Infection not excluded. Chest CT correlation could be considered.      JESSICA BARRAGAN MD            SYSTEM ID:  [id number]      Head CT w/o contrast   Final Result   IMPRESSION: No acute intracranial abnormality.      JESSICA BARRAGAN MD            SYSTEM ID:  [id number]      Lumbar spine MRI w/o contrast    (Results Pending)   .   Treatments provided: See MAR  Family Comments: Patient has hx of dementia.   OBS brochure/video discussed/provided to patient:  N/A  ED Medications:   Medications   0.9% sodium chloride BOLUS (0 mLs Intravenous Stopped 1/10/23 5642)     Followed by   sodium chloride 0.9% infusion (has no administration in time range)   CT Scan Flush (56 mLs Intravenous Given 1/10/23  1658)   iopamidol (ISOVUE-370) solution 500 mL (65 mLs Intravenous Given 1/10/23 1658)   QUEtiapine (SEROquel) tablet 25 mg (25 mg Oral Given 1/10/23 1842)   LORazepam (ATIVAN) injection 0.5 mg (0.5 mg Intravenous Given 1/10/23 1928)   LORazepam (ATIVAN) injection 0.5 mg (0.5 mg Intravenous Given 1/1949)     Drips infusing:  No  For the majority of the shift, the patient's behavior Green. Interventions performed were NA.    Sepsis treatment initiated: No     Patient tested for COVID 19 prior to admission: YES    ED Nurse Name/Phone Number: Olivia Casillas RN,   11:06 PM    RECEIVING UNIT ED HANDOFF REVIEW    Above ED Nurse Handoff Report was reviewed: Yes  Reviewed by: Kayla Ying RN on January 11, 2023 at 1:43 PM

## 2023-01-11 NOTE — PROVIDER NOTIFICATION
MD paged [phone]:  FYI ED31 BP is still high, 159/107. No PRNs ordered lmk if you'd like me to give anything. Thanks

## 2023-01-11 NOTE — PROGRESS NOTES
"Spouse at bedside this AM. Somnolent but arouses to touch/voice. Bedrest over night. Does not appear to be in pain at rest but with rolling in bed patient does groan. VSS on RA. Lung sounds clear. Purewick in place. Neurosurgery to see.     BP (!) 170/92 (BP Location: Left arm, Patient Position: Semi-Berg's, Cuff Size: Adult Regular)   Pulse 73   Temp 98  F (36.7  C)   Resp 14   Ht 1.702 m (5' 7\")   Wt 59 kg (130 lb)   SpO2 97%   BMI 20.36 kg/m      "

## 2023-01-11 NOTE — CONSULTS
[facility]    Neurosurgery Consultation     Date of Admission:  1/10/2023  Date of Consult (When I saw the patient): 01/11/23    Assessment & Plan   Elsa Jackson is a 72 year old female who was admitted on 1/10/2023. Elsa Jackson is a 72 year old female who presents with multiple falls over the last several days (first fall Friday 1/6 GALEN fall when getting up from toilet landing on buttocks, and another fall 1/10 GALEN fall out of bed with radiographic evidence of acute L1 burst type fracture.    Patient and  provided history and are reliable. Patient notes after her fall Friday she had immediate low back pain and continued back pain. She notes that back increased after her fall yesterday 1/10 prompting visit to ED.  notes he is not sure how she fell out of bed overnight but found her laying on the side of her bed. Per reports,  reported history of wife having dementia. Patient notes low back pain is localized, aggravated with movement and alleviated with laying down and resting. She denies changes in bowel/bladder, radicular sx, paresthesias, weakness. She notes back pain has significantly improved with medications she has received in ED. She is on calcium but denies medication regimen     EXAM: MR THORACIC SPINE W/O CONTRAST  LOCATION: [facility]  DATE/TIME: 1/10/2023 8:01 PM     INDICATION: trauma, fall, low back pain  COMPARISON: None.  TECHNIQUE: Limited examination with coronal T1 and sagittal T1 and T2 sequences. Additional sequences were not obtained secondary to patient's altered mentation and difficulty during scan.     FINDINGS:   Normal alignment of the thoracic spine. There is acute burst type fracture at L1 with involvement of the anterior vertebral margin, posterior vertebral margin, superior endplate, and inferior endplate. There is 5 mm retropulsion. No associated spinal   canal or neural foraminal stenosis. No identified epidural  hematoma. There is chronic superior endplate compression fracture at T8. Normal disc heights. No herniation. Normal facets. No spinal canal or neural foraminal stenosis. No abnormal cord signal.      No extraspinal abnormality.                                                                      IMPRESSION:  1.  Limited examination secondary to patient cooperation.  2.  Acute L1 burst type fracture. By definition this fracture is unstable.  3.  Chronic T8 compression fracture.    Clinical history, imaging and plans reviewed myself as well as with Dr. Youngblood, patient, . No surgical intervention indicated at this time. Plan for TLSO brace, upright XR once in brace, conservative measures with light activity (Please limit your lifting to no more that ten pounds and avoid excessive bending, twisting and turning at the lumbar spine. You should also avoid excessive jostling and jarring activities). Recommend follow up in 6 and 12 weeks with upright XR prior, our office will coordinate these.     I have discussed the following assessment and plan with Dr. Youngblood who is in agreement with the initial plan and will follow up with further consultation recommendations.    Ami Casillas PA-C  [facility] [place] [address]    Tel [phone]  Pager [phone]    Code Status    Full Code    Reason for Consult   Reason for consult: I was asked by Dr. Vasquez to evaluate this patient for L1 fracture.    Primary Care Physician   [facility]    Chief Complaint   Fall x 2 with back pain    History is obtained from the patient, electronic health record, emergency department physician and patient's spouse    History of Present Illness   Elsa Jackson is a 72 year old female who presents with multiple falls over the last several days (first fall Friday 1/6 GALEN fall when getting up from toilet landing on buttocks, and another fall 1/10 GALEN  fall out of bed with radiographic evidence of acute L1 burst type fracture.    Patient and  provided history and are reliable. Patient notes after her fall Friday she had immediate low back pain and continued back pain. She notes that back increased after her fall yesterday 1/10 prompting visit to ED.  notes he is not sure how she fell out of bed overnight but found her laying on the side of her bed. Per reports,  reported history of wife having dementia. Patient notes low back pain is localized, aggravated with movement and alleviated with laying down and resting. She denies changes in bowel/bladder, radicular sx, paresthesias, weakness. She notes back pain has significantly improved with medications she has received in ED. She is on calcium but denies medication regimen     EXAM: MR THORACIC SPINE W/O CONTRAST  LOCATION: [facility]  DATE/TIME: 1/10/2023 8:01 PM     INDICATION: trauma, fall, low back pain  COMPARISON: None.  TECHNIQUE: Limited examination with coronal T1 and sagittal T1 and T2 sequences. Additional sequences were not obtained secondary to patient's altered mentation and difficulty during scan.     FINDINGS:   Normal alignment of the thoracic spine. There is acute burst type fracture at L1 with involvement of the anterior vertebral margin, posterior vertebral margin, superior endplate, and inferior endplate. There is 5 mm retropulsion. No associated spinal   canal or neural foraminal stenosis. No identified epidural hematoma. There is chronic superior endplate compression fracture at T8. Normal disc heights. No herniation. Normal facets. No spinal canal or neural foraminal stenosis. No abnormal cord signal.      No extraspinal abnormality.                                                                      IMPRESSION:  1.  Limited examination secondary to patient cooperation.  2.  Acute L1 burst type fracture. By definition this fracture is unstable.  3.   "Chronic T8 compression fracture.    Past Medical History   I have reviewed this patient's medical history and updated it with pertinent information if needed.   No past medical history on file.    Past Surgical History   I have reviewed this patient's surgical history and updated it with pertinent information if needed.  No past surgical history on file.    Prior to Admission Medications   Prior to Admission Medications   Prescriptions Last Dose Informant Patient Reported? Taking?   QUEtiapine (SEROQUEL) 25 MG tablet 1/10/2023 at am  Yes Yes   Sig: Take 25 mg by mouth every morning   levothyroxine (SYNTHROID/LEVOTHROID) 112 MCG tablet 1/10/2023 at am  Yes Yes   Sig: Take 112 mcg by mouth daily      Facility-Administered Medications: None     Allergies   No Known Allergies    Social History   I have reviewed this patient's social history and updated it with pertinent information if needed. Elsa Jackson      Family History   I have reviewed this patient's family history and updated it with pertinent information if needed.   No family history on file.    Review of Systems    ROS: 10 point ROS neg other than the symptoms noted above in the HPI.    Physical Exam   Temp: 97.8  F (36.6  C) Temp src: Oral BP: (!) 169/91 Pulse: 84   Resp: 16 SpO2: 96 % O2 Device: None (Room air)    Vital Signs with Ranges  Temp:  [97.8  F (36.6  C)-98  F (36.7  C)] 97.8  F (36.6  C)  Pulse:  [73-97] 84  Resp:  [12-18] 16  BP: (115-175)/() 169/91  SpO2:  [93 %-99 %] 96 %  130 lbs 0 oz     , Blood pressure (!) 169/91, pulse 84, temperature 97.8  F (36.6  C), temperature source Oral, resp. rate 16, height 5' 7\" (1.702 m), weight 130 lb (59 kg), SpO2 96 %.  130 lbs 0 oz    NEUROLOGICAL EXAMINATION:   Mental status:  Awake, alert, appropriate, following commands, speech is fluent. Forgetful at times throughout history.  Cranial nerves:  II-XII grossly intact.   Motor:  Strength is 5/5 throughout the upper and lower extremities  Shoulder " Abduction:  Right:  5/5   Left:  5/5  Biceps:                      Right:  5/5   Left:  5/5  Triceps:                     Right:  5/5   Left:  5/5  Wrist Extensors:       Right:  5/5   Left:  5/5  Wrist Flexors:           Right:  5/5   Left:  5/5  interosseus :            Right:  5/5   Left:  5/5   Hip Flexor:                Right: 5/5  Left:  5/5  Hip Adductor:             Right:  5/5  Left:  5/5  Hip Abductor:             Right:  5/5  Left:  5/5  Gastroc Soleus:        Right:  5/5  Left:  5/5  Tib/Ant:                      Right:  5/5  Left:  5/5  EHL:                     Right:  5/5  Left:  5/5  Sensation:  Intact to light touch throughout   Reflexes:   Negative Babinski.  Negative Clonus.      Cervical examination reveals good range of motion.  No tenderness to palpation of the cervical spine or paraspinous muscles bilaterally.     Lumbar examination reveals no tenderness of the spine or paraspinous muscles.  Hip height is symmetrical. Negative SI joint, sciatic notch or greater trochanteric tenderness to palpation bilaterally.  Straight leg raise is negative bilaterally.       Data   All new lab and imaging data was personally reviewed by me.    EXAM: MR THORACIC SPINE W/O CONTRAST  LOCATION: [facility]  DATE/TIME: 1/10/2023 8:01 PM     INDICATION: trauma, fall, low back pain  COMPARISON: None.  TECHNIQUE: Limited examination with coronal T1 and sagittal T1 and T2 sequences. Additional sequences were not obtained secondary to patient's altered mentation and difficulty during scan.     FINDINGS:   Normal alignment of the thoracic spine. There is acute burst type fracture at L1 with involvement of the anterior vertebral margin, posterior vertebral margin, superior endplate, and inferior endplate. There is 5 mm retropulsion. No associated spinal   canal or neural foraminal stenosis. No identified epidural hematoma. There is chronic superior endplate compression fracture at T8. Normal disc  heights. No herniation. Normal facets. No spinal canal or neural foraminal stenosis. No abnormal cord signal.      No extraspinal abnormality.                                                                      IMPRESSION:  1.  Limited examination secondary to patient cooperation.  2.  Acute L1 burst type fracture. By definition this fracture is unstable.  3.  Chronic T8 compression fracture.  CBC RESULTS:   Recent Labs   Lab Test 01/10/23  1155   WBC 8.8   RBC 3.80   HGB 12.2   HCT 37.5   MCV 99   MCH 32.1   MCHC 32.5   RDW 12.3        Basic Metabolic Panel:  Lab Results   Component Value Date     01/10/2023      Lab Results   Component Value Date    POTASSIUM 3.9 01/10/2023     Lab Results   Component Value Date    CHLORIDE 102 01/10/2023     Lab Results   Component Value Date    MYNOR 9.8 01/10/2023     Lab Results   Component Value Date    CO2 26 01/10/2023     Lab Results   Component Value Date    BUN 25.5 01/10/2023     Lab Results   Component Value Date    CR 1.07 01/10/2023     Lab Results   Component Value Date     01/10/2023     INR:  Lab Results   Component Value Date    INR 1.02 01/10/2023

## 2023-01-11 NOTE — PROGRESS NOTES
Fit patient with tlso brace with STEVEN Mckinnon.  Written instructions given.   may have questions.  Contact information left with patient.  Matthias Brown.

## 2023-01-11 NOTE — PLAN OF CARE
"Goal Outcome Evaluation:       VSS ex elevated BP. Neuros intact, pt inconsistent with following commands but equal on both sides. Denies pain, however occasionally says her back \"is her problem area\". Lidocaine patch in place.  at bedside. Fit with orthotic brace, going to Xray before 6th floor. Pt fidgeting in bed after  left room, able to be redirected but did pull IV and purewick out. UA sent. Lunch eaten.     BP (!) 159/107   Pulse 84   Temp 97.8  F (36.6  C) (Oral)   Resp 16   Ht 1.702 m (5' 7\")   Wt 59 kg (130 lb)   SpO2 96%   BMI 20.36 kg/m                     "

## 2023-01-11 NOTE — PROGRESS NOTES
[facility]    Hospitalist Progress Note    Date of Service (when I saw the patient): 01/11/2023    Assessment & Plan   Elsa Jackson is a 72 year old female with PMH of hypothyroidism, osteoarthritis, dementia who was brought to the emergency room by her  after episode of fall and some degree of loss of consciousness or unresponsiveness and was found to have L1 burst type fracture and admitted to hospital on 1/10/2023.   Was also reported the patient apparently fell down 4 days ago in the bathroom that was not witnessed.  Patient complained of pain in her lower back,    1.  Fall suspected mechanical.  2.  Closed L1 burst type fracture.  3.  Chronic T8, L3 compression fracture  3.  Dementia with behavioral disturbance  -Patient is still somewhat confused but easily redirectable.  -Continue close monitoring, risk of fall.  -Neurosurgery consulted recommended nonsurgical conservative measures.  -MRI as reported.  -TLSO brace.  -Activity, PT OT Per neurosurgery.  -Continue Seroquel as needed.  -Ordered Ativan 0.5 mg IV and Haldol to 2mg IV every 6 hours as needed for agitation.  -Call MD to adjust medication if needed for agitation.    DVT Prophylaxis: Pneumatic Compression Devices.  Code Status: Full Code    Disposition: Expected discharge in 2 days, likely needs placement pending further evaluation and recommendation.  The underlying dementia is complicating her overall Care.    Lee Nagel MD    Interval History   Patient seen and examined, assumed care today, H&P, labs, imaging, medications reviewed by me.  Patient remained confused, easily very redirectable but does not give any detailed history.    -Data reviewed today: I reviewed all new labs and imaging results over the last 24 hours. I personally reviewed .    Physical Exam   Temp: 97.8  F (36.6  C) Temp src: Temporal BP: (!) 175/85 Pulse: 79   Resp: 16 SpO2: 97 % O2 Device: None (Room air)    Vitals:    01/10/23 1154    Weight: 59 kg (130 lb)     Vital Signs with Ranges  Temp:  [97.8  F (36.6  C)] 97.8  F (36.6  C)  Pulse:  [73-87] 79  Resp:  [12-18] 16  BP: (144-175)/() 175/85  SpO2:  [93 %-99 %] 97 %  I/O last 3 completed shifts:  In: 240 [P.O.:240]  Out: 700 [Urine:700]    Constitutional: Awake and does not give any detailed history, redirectable but confused  Respiratory: Good air entry bilaterally, no wheezing crackles or rales.  Cardiovascular: S1 and S2 regular, no gallop or murmur.  GI: Soft, nontender, nondistended, positive bowel sounds.  Skin/Integumen: No rash or exanthems.  Neuror: Awake, moves all extremities, no focal deficits on gross examination.      Medications     sodium chloride Stopped (01/11/23 1202)       levothyroxine  112 mcg Oral Daily     lidocaine  1 patch Transdermal Q24H     lidocaine   Transdermal Q8H LESLEY     QUEtiapine  25 mg Oral QAM     sodium chloride (PF)  3 mL Intracatheter Q8H       Data   Recent Labs   Lab 01/10/23  1155   WBC 8.8   HGB 12.2   MCV 99      INR 1.02      POTASSIUM 3.9   CHLORIDE 102   CO2 26   BUN 25.5*   CR 1.07*   ANIONGAP 13   MYNOR 9.8   *       Recent Results (from the past 24 hour(s))   CT Cervical Spine w/o Contrast    Narrative    EXAM: CT CERVICAL SPINE W/O CONTRAST  LOCATION: [facility]  DATE/TIME: 1/10/2023 5:07 PM    INDICATION: trauma  COMPARISON: None.  TECHNIQUE: Routine CT Cervical Spine without IV contrast. Multiplanar reformats. Dose reduction techniques were used.    FINDINGS:  VERTEBRA: Normal vertebral body heights and alignment. No fracture or posttraumatic subluxation.     CANAL/FORAMINA: No canal or neural foraminal stenosis.    PARASPINAL: 5 mm left thyroid nodule does not meet imaging criteria for further workup or follow-up.      Impression    IMPRESSION:  1.  No fracture or posttraumatic subluxation.  2.  No high-grade spinal canal or neural foraminal stenosis.   CT Chest/Abdomen/Pelvis w Contrast     Narrative    EXAM: CT CHEST/ABDOMEN/PELVIS W CONTRAST  LOCATION: [facility]  DATE/TIME: 1/10/2023 5:08 PM    INDICATION: trauma, back pain  COMPARISON: None.  TECHNIQUE: CT scan of the chest, abdomen, and pelvis was performed following injection of IV contrast. Multiplanar reformats were obtained. Dose reduction techniques were used.   CONTRAST: 65mL Isovue 370    FINDINGS:   LUNGS AND PLEURA: No focal airspace consolidation. Possible trace right pleural effusion. No pneumothorax.    MEDIASTINUM/AXILLAE: No evidence of acute injury. No suspicious lymphadenopathy.    CORONARY ARTERY CALCIFICATION: Mild.    HEPATOBILIARY: Normal.    PANCREAS: Normal.    SPLEEN: Normal.    ADRENAL GLANDS: Normal.    KIDNEYS/BLADDER: No hydronephrosis. Trace right perinephric stranding. Urinary bladder is decompressed but otherwise unremarkable.    BOWEL: Diverticulosis of the colon. No acute inflammatory change. No obstruction. No mesenteric hematoma or pneumoperitoneum.    LYMPH NODES: Normal.    VASCULATURE: Moderate calcified atherosclerosis.    PELVIC ORGANS: Pessary device in place. Otherwise unremarkable.    MUSCULOSKELETAL: Acute fracture of the L1 vertebral body with greater than 50% central height loss. Slight retropulsion of posterior fragments with mild resultant spinal canal narrowing. No definite extension into the posterior elements. There are   several foci of adjacent gas which extend into nearby soft tissue. Mild surrounding stranding/blood products. Additional age-indeterminate compression deformities of T8 and L3, favor chronic. No additional acute bony abnormality.        Impression    IMPRESSION:  1.  Acute fracture of L1 with greater than 50% central height loss and some retropulsion of posterior fragments with resultant spinal canal narrowing. No definite extension into the posterior elements.  2.  Additional compression deformities of T8 and L3, favor chronic but recommend correlation with  point tenderness.  3.  No additional evidence of acute trauma in the chest, abdomen or pelvis.   Thoracic spine MRI w/o contrast    Narrative    EXAM: MR THORACIC SPINE W/O CONTRAST  LOCATION: [facility]  DATE/TIME: 1/10/2023 8:01 PM    INDICATION: trauma, fall, low back pain  COMPARISON: None.  TECHNIQUE: Limited examination with coronal T1 and sagittal T1 and T2 sequences. Additional sequences were not obtained secondary to patient's altered mentation and difficulty during scan.    FINDINGS:   Normal alignment of the thoracic spine. There is acute burst type fracture at L1 with involvement of the anterior vertebral margin, posterior vertebral margin, superior endplate, and inferior endplate. There is 5 mm retropulsion. No associated spinal   canal or neural foraminal stenosis. No identified epidural hematoma. There is chronic superior endplate compression fracture at T8. Normal disc heights. No herniation. Normal facets. No spinal canal or neural foraminal stenosis. No abnormal cord signal.     No extraspinal abnormality.      Impression    IMPRESSION:  1.  Limited examination secondary to patient cooperation.  2.  Acute L1 burst type fracture. By definition this fracture is unstable.  3.  Chronic T8 compression fracture.   XR Lumbar Spine 2/3 Views    Narrative    LUMBAR SPINE TWO TO THREE VIEWS January 11, 2023 2:09 PM     HISTORY: L1 fracture. Upright XR in brace.    COMPARISON: Lumbar spine CT 1/10/2023.      Impression    IMPRESSION: Acute L1 burst fracture with moderate height loss. Chronic  L3 compression fracture with mild height loss. Alignment appears  unchanged. Brace in place.    JESSICA BARRAGAN MD         SYSTEM ID:  [id number]

## 2023-01-11 NOTE — PROGRESS NOTES
XR reviewed with Dr Youngblood   Non surgical, continue conservative measures  Continue with current plans- pain control bracing when out of bed and upright, therapy, conservative measures     LUMBAR SPINE TWO TO THREE VIEWS January 11, 2023 2:09 PM      HISTORY: L1 fracture. Upright XR in brace.     COMPARISON: Lumbar spine CT 1/10/2023.                                                                      IMPRESSION: Acute L1 burst fracture with moderate height loss. Chronic  L3 compression fracture with mild height loss. Alignment appears  unchanged. Brace in place.     JESSICA BARRAGAN MD

## 2023-01-12 ENCOUNTER — APPOINTMENT (OUTPATIENT)
Dept: OCCUPATIONAL THERAPY | Facility: CLINIC | Age: 73
DRG: 542 | End: 2023-01-12
Attending: INTERNAL MEDICINE
Payer: COMMERCIAL

## 2023-01-12 PROCEDURE — 250N000013 HC RX MED GY IP 250 OP 250 PS 637: Performed by: HOSPITALIST

## 2023-01-12 PROCEDURE — 120N000001 HC R&B MED SURG/OB

## 2023-01-12 PROCEDURE — 97535 SELF CARE MNGMENT TRAINING: CPT | Mod: GO

## 2023-01-12 PROCEDURE — 97165 OT EVAL LOW COMPLEX 30 MIN: CPT | Mod: GO

## 2023-01-12 PROCEDURE — 99232 SBSQ HOSP IP/OBS MODERATE 35: CPT | Performed by: PHYSICIAN ASSISTANT

## 2023-01-12 PROCEDURE — 99232 SBSQ HOSP IP/OBS MODERATE 35: CPT | Performed by: INTERNAL MEDICINE

## 2023-01-12 PROCEDURE — 250N000013 HC RX MED GY IP 250 OP 250 PS 637: Performed by: INTERNAL MEDICINE

## 2023-01-12 RX ORDER — HYDRALAZINE HYDROCHLORIDE 20 MG/ML
5 INJECTION INTRAMUSCULAR; INTRAVENOUS EVERY 4 HOURS PRN
Status: DISCONTINUED | OUTPATIENT
Start: 2023-01-12 | End: 2023-01-14 | Stop reason: HOSPADM

## 2023-01-12 RX ADMIN — LIDOCAINE 1 PATCH: 246 PATCH TOPICAL at 06:15

## 2023-01-12 RX ADMIN — AMLODIPINE BESYLATE 5 MG: 5 TABLET ORAL at 08:47

## 2023-01-12 RX ADMIN — LEVOTHYROXINE SODIUM 112 MCG: 0.11 TABLET ORAL at 06:15

## 2023-01-12 RX ADMIN — QUETIAPINE FUMARATE 25 MG: 25 TABLET ORAL at 08:47

## 2023-01-12 ASSESSMENT — ACTIVITIES OF DAILY LIVING (ADL)
ADLS_ACUITY_SCORE: 48
IADL_COMMENTS: SPOUSE COMPLETES ALL IADLS
ADLS_ACUITY_SCORE: 48

## 2023-01-12 NOTE — PROGRESS NOTES
[facility]    Neurosurgery  Daily Note    Assessment & Plan   Elsa Jackson is a 72 year old female who was admitted on 1/10/2023. Elsa Jackson is a 72 year old female who presented 1/10/23 with multiple falls over the last several days (first fall Friday 1/6 GALEN fall/ when getting up from toilet landing on buttocks, and another fall 1/10 GALEN fall out of bed with radiographic evidence of acute L1 burst type fracture.    AM ROUNDS- patient notes back pain has significantly improved on medications. Denies new radicular sx, paresthesias, weakness. Intact on exam. TLSO brace in place.       EXAM: MR THORACIC SPINE W/O CONTRAST  LOCATION: [facility]  DATE/TIME: 1/10/2023 8:01 PM     INDICATION: trauma, fall, low back pain  COMPARISON: None.  TECHNIQUE: Limited examination with coronal T1 and sagittal T1 and T2 sequences. Additional sequences were not obtained secondary to patient's altered mentation and difficulty during scan.     FINDINGS:   Normal alignment of the thoracic spine. There is acute burst type fracture at L1 with involvement of the anterior vertebral margin, posterior vertebral margin, superior endplate, and inferior endplate. There is 5 mm retropulsion. No associated spinal   canal or neural foraminal stenosis. No identified epidural hematoma. There is chronic superior endplate compression fracture at T8. Normal disc heights. No herniation. Normal facets. No spinal canal or neural foraminal stenosis. No abnormal cord signal.      No extraspinal abnormality.                                                                      IMPRESSION:  1.  Limited examination secondary to patient cooperation.  2.  Acute L1 burst type fracture. By definition this fracture is unstable.  3.  Chronic T8 compression fracture.    LUMBAR SPINE TWO TO THREE VIEWS January 11, 2023 2:09 PM      HISTORY: L1 fracture. Upright XR in brace.     COMPARISON: Lumbar spine CT  1/10/2023.                                                                      IMPRESSION: Acute L1 burst fracture with moderate height loss. Chronic  L3 compression fracture with mild height loss. Alignment appears  unchanged. Brace in place.     JESSICA BARRAGAN MD     Plan:  -no surgical intervention indicated at this time   -recommend conservative management  -TLSO brace to be worn when out of bed and ambulating. OK to remove when laying down and resting  -light duties only. Please limit your lifting to no more that ten pounds and avoid excessive bending, twisting and turning at the lumbar spine. You should also avoid excessive jostling and jarring activities.   -recommend follow up in 6 weeks with upright lumbar XR prior  -our team tried to schedule patient in outpatient follow up however our team is not covered under patient insurance. Therefore, placed referral for case management/social work to assist with finding team that is covered for outpatient follow up   -reviewed with  discharge is per Medical Team and Social work team. Cleared for discharge from NSGY standpoint   -our team iwll sign off at this time   -page or call with questions   -Dr. Youngblood reviewed all history, imaging and in agreement with plans     Ami Casillas PA-C  [facility] [place] [address]    Tel [phone]  Pager [phone]    Principal Problem:    Fall, initial encounter  Active Problems:    Closed unstable burst fracture of first lumbar vertebra, initial encounter (H)      Ami Casillas PA-C    Interval History   Stable     Physical Exam   Temp: 98  F (36.7  C) Temp src: Temporal BP: (!) 160/89 Pulse: 77   Resp: 16 SpO2: 98 % O2 Device: None (Room air)    Vitals:    01/10/23 1154   Weight: 130 lb (59 kg)     Vital Signs with Ranges  Temp:  [97.6  F (36.4  C)-99.4  F (37.4  C)] 98  F (36.7  C)  Pulse:  [55-90] 77  Resp:  [16-20] 16  BP:  (146-202)/() 160/89  SpO2:  [91 %-98 %] 98 %  I/O last 3 completed shifts:  In: 490 [P.O.:490]  Out: 700 [Urine:700]    Awake, alert, appropriate   5/5 motor strength BLE   Sensation intact BLE   Negative clonus   TLSO brace in place     Medications     sodium chloride Stopped (01/11/23 1202)        amLODIPine  5 mg Oral Daily     levothyroxine  112 mcg Oral Daily     lidocaine  1 patch Transdermal Q24H     lidocaine   Transdermal Q8H LESLEY     QUEtiapine  25 mg Oral QAM     sodium chloride (PF)  3 mL Intracatheter Q8H       Plans discussed with Dr. Youngblood who was in agreement with plans    Ami Casillas PA-C  [facility] Neurosurgery  [address] [address]    Tel [phone]  Pager [phone]

## 2023-01-12 NOTE — PROGRESS NOTES
01/12/23 1612   Appointment Info   Signing Clinician's Name / Credentials (OT) Cheryl Astorga OTR/L   Living Environment   People in Home spouse   Current Living Arrangements house   Home Accessibility stairs to enter home;stairs within home   Number of Stairs, Main Entrance 1   Number of Stairs, Within Home, Primary greater than 10 stairs   Transportation Anticipated family or friend will provide   Living Environment Comments Pt lives with spouse in rambler style house, 1 FLORIN, full flight to lower level (family room, laundry), walk in shower, standard height toilet.   Self-Care   Usual Activity Tolerance good   Current Activity Tolerance moderate   Regular Exercise Yes   Activity/Exercise Type walking   Exercise Amount/Frequency daily   Equipment Currently Used at Home none   Fall history within last six months yes   Number of times patient has fallen within last six months 2   Activity/Exercise/Self-Care Comment Spouse provided info regarding PLOF: Spouse assists pt with dressing (orientation of clothes, etc) and IADLs (homemaking, driving, med mgmt, etc). Pt ambulates independently without AD at baseline.   Instrumental Activities of Daily Living (IADL)   IADL Comments Spouse completes all IADLs   General Information   Onset of Illness/Injury or Date of Surgery 01/10/23   Referring Physician Lee Nagel MD   Patient/Family Therapy Goal Statement (OT) Pt/family's goal is to d/c home   Additional Occupational Profile Info/Pertinent History of Current Problem Per chart: Pt is a 72 year old female admitted after falls with L1 burst type fracture.   Existing Precautions/Restrictions brace worn when out of bed;fall;spinal   Cognitive Status Examination   Orientation Status person   Safety Deficit impulsivity   Cognitive Status Comments Pt with baseline dementia   Sensory   Sensory Quick Adds sensation intact   Pain Assessment   Patient Currently in Pain Yes, see Vital Sign flowsheet  (back pain)   Range  of Motion Comprehensive   Comment, General Range of Motion [facility]   Strength Comprehensive (MMT)   Comment, General Manual Muscle Testing (MMT) Assessment [facility]   Bed Mobility   Bed Mobility supine-sit;sit-supine   Supine-Sit [place] (Bed Mobility) supervision   Sit-Supine [place] (Bed Mobility) supervision   Transfers   Transfers bed-chair transfer;sit-stand transfer;toilet transfer;shower transfer   Transfer Skill: Bed to Chair/Chair to Bed   Bed-Chair [place] (Transfers) minimum assist (75% patient effort)   Assistive Device (Bed-Chair Transfers) rolling walker   Sit-Stand Transfer   Sit-Stand [place] (Transfers) minimum assist (75% patient effort)   Assistive Device (Sit-Stand Transfers) walker, front-wheeled   Shower Transfer   [place] Level (Shower Transfer) not tested   Toilet Transfer   [place] Level (Toilet Transfer) minimum assist (75% patient effort)   Assistive Device (Toilet Transfer) walker, front-wheeled   Balance   Balance Comments Mild unsteadiness, missteps at times with narrow DWAYNE. Pt required assist for safe mgmt of FWW   Activities of Daily Living   [facility] Assessment/Intervention upper body dressing;lower body dressing;grooming;toileting   Upper Body Dressing Assessment/Training   [place] Level (Upper Body Dressing) minimum assist (75% patient effort)   Lower Body Dressing Assessment/Training   [place] Level (Lower Body Dressing) maximum assist (25% patient effort)   Grooming Assessment/Training   [place] Level (Grooming) minimum assist (75% patient effort)   Toileting   [place] Level (Toileting) minimum assist (75% patient effort)   Clinical Impression   Criteria for Skilled Therapeutic Interventions Met (OT) Yes, treatment indicated   OT Diagnosis Impaired ADLs, mobility tasks   OT Problem List-Impairments impacting ADL problems related to;activity tolerance impaired;balance;cognition;strength;pain   ADL comments/analysis Pt below baseline  level of functioning   Assessment of Occupational Performance 3-5 Performance Deficits   Identified Performance Deficits Grooming, toileting, dressing, transfers   Planned Therapy Interventions (OT) ADL retraining;cognition;strengthening;transfer training;home program guidelines;progressive activity/exercise;risk factor education   Clinical Decision Making Complexity (OT) low complexity   Risk & Benefits of therapy have been explained evaluation/treatment results reviewed;care plan/treatment goals reviewed;risks/benefits reviewed;current/potential barriers reviewed;participants voiced agreement with care plan;participants included;patient;spouse/significant other;sibling   OT Total Evaluation Time   OT Eval, Low Complexity Minutes (65759) 10   OT Goals   Therapy Frequency (OT) Daily   OT Predicted Duration/Target Date for Goal Attainment 01/18/23   OT Goals Hygiene/Grooming;Upper Body Dressing;Lower Body Dressing;Toilet Transfer/Toileting;OT Goal 1   OT: Hygiene/Grooming supervision/stand-by assist;using adaptive equipment;within precautions;while standing   OT: Upper Body Dressing Supervision/stand-by assist;including orthotic;using adaptive equipment;within precautions   OT: Lower Body Dressing Supervision/stand-by assist;using adaptive equipment;within precautions   OT: Toilet Transfer/Toileting Supervision/stand-by assist;toilet transfer;cleaning and garment management;using adaptive equipment;within precautions   OT: Goal 1 Pt will perform walk in shower transfer with CGA in prep for safe transfer in discharge environment.   OT Discharge Planning   OT Discharge Recommendation (DC Rec) home with assist;home with home care occupational therapy;Leaving home requires significant assistance;Leaving home requires significant taxing effort   OT Rationale for DC Rec Pt presents below baseline level of functioning, limited by impaired activity tolerance and spinal precautions. Recommend ongoing skilled OT while IP and  in HH setting for continued strengthening and safety. Recommend shower chair for improved safety in home environment.   OT Brief overview of current status CGA/min A for transfers/mobility, mod A ADLs   Total Session Time   Total Session Time (sum of timed and untimed services) 10

## 2023-01-12 NOTE — CONSULTS
"CLINICAL NUTRITION SERVICES  -  ASSESSMENT NOTE      Recommendations:   - Continue diet as ordered.  - Discussed prn Ensure with  in room, and sitter in room.     MALNUTRITION:  % Weight Loss:  > 10% in 6 months (severe malnutrition)  % Intake:  <75% for >/= 3 months (moderate malnutrition)  Subcutaneous Fat Loss:  Upper arm region moderate depletion --> not using as indicator with only 1 region present   Muscle Loss:  Temporal region mild depletion, Clavicle bone region mild depletion, Acromion bone region mild depletion, Patellar region mild to moderate depletion, Anterior thigh region mild to moderate depletion and Posterior calf region mild to moderate depletion  Fluid Retention: None documented    Malnutrition Diagnosis: Moderate malnutrition  In Context of:  Acute illness or injury with underlying chronic illness or disease          REASON FOR ASSESSMENT  Elsa Jackson is a 72 year old female seen by Registered Dietitian for Admission Nutrition Risk Screen for positive.    PMH of: Dementia.    Admit 2/2: Fall with burst fracture, compression.     NUTRITION HISTORY  - Information obtained from patient and chart.  Patient has h/o dementia and family in room but they do not provide much insight and let patient give the hx.   - Diet at home: Regular.  - Barriers to PO intakes: Reports she hasn't been feeling well/herself for ~1 month leading to low appetite and decrease oral intakes.  - Use of oral supplements: None.  - Allergies: NKFA.      CURRENT NUTRITION ORDERS  Diet Order:     Regular    Current Intake/Tolerance:  0-75% intakes based on flowsheet review since admission.  Offered scheduling of Ensure and family in room report they will order as needed from room service.       Obtained from Chart/Interdisciplinary Team:  - No documentation of PI  - Stooling patterns reviewed    ANTHROPOMETRICS  Height: 5' 7\"  Weight: 130 lbs 0 oz  Body mass index is 20.36 kg/m .  Weight Status:  Normal BMI  Weight " History:  Wt Readings from Last 10 Encounters:   01/10/23 59 kg (130 lb)     - Wt of 159# from 3/17/2022.   - 18% wt loss in the past 10 months, if admit wt accurate.  - No current documentation of edema.    LABS  Labs reviewed.      MEDICATIONS  Medications reviewed.      ASSESSED NUTRITION NEEDS PER APPROVED PRACTICE GUIDELINES:    Dosing Weight 59 kg   Estimated Energy Needs: 30-35 Kcal/Kg  Justification: repletion  Estimated Protein Needs: 1.2-1.5 g pro/Kg  Justification: preservation of lean body mass and repletion  Estimated Fluid Needs: per MD      NUTRITION DIAGNOSIS:  Inadequate oral intake related to decreased appetite as evidenced by meeting <75% needs for suspect months with 18% wt loss in 10 months, malnutrition criteria met.    NUTRITION INTERVENTIONS  Recommendations / Nutrition Prescription  See above.      Implementation  Nutrition education: Provided education on above.    Medical Food Supplement: As above.     Collaboration and Referral of Nutrition care: Discussed POC briefly with RN.    Nutrition Goals  Patient to consume at least 50-75% of meals or supplements TID while admitted.       MONITORING AND EVALUATION:  Progress towards goals will be monitored and evaluated per protocol and Practice Guidelines          Josee Arrieta RDN, LD  Clinical Dietitian  3rd floor/ICU: [phone]  All other floors: [phone]  Weekend/holiday: [phone]  Office: [phone]

## 2023-01-12 NOTE — PROGRESS NOTES
[facility]    Hospitalist Progress Note    Date of Service (when I saw the patient): 01/12/2023    Assessment & Plan   Elsa Jackson is a 72 year old female with PMH of hypothyroidism, osteoarthritis, dementia who was brought to the emergency room by her  after episode of fall and some degree of loss of consciousness or unresponsiveness and was found to have L1 burst type fracture and admitted to hospital on 1/10/2023.   Was also reported the patient apparently fell down 4 days ago in the bathroom that was not witnessed.  Patient complained of pain in her lower back,    1.  Fall suspected mechanical.  2.  Closed L1 burst type fracture.  3.  Chronic T8, L3 compression fracture  3.  Dementia, behavioral disturbance better today..  -She is more awake and alert today.  -Continue close monitoring, risk of fall.  -Neurosurgery consulted recommended nonsurgical conservative measures.  -TLSO brace.  -Pain is fairly controlled.  -Activity, PT/OT Per neurosurgery.  -Continue Seroquel as needed.  -Ativan 0.5 mg IV and Haldol to 2mg IV every 6 hours as needed for agitation, doing better today..  -Call MD to adjust medication if needed for agitation.    4.  Hypothyroidism: Continue synthroid.    5. HTN: Started on Amlodipine. Added hydralazine with parameters.  6. Mildly elevated Troponin: Trop is flat. No clinical significance.     Clinically Significant Risk Factors                         # Moderate Malnutrition: based on nutrition assessment, PRESENT ON ADMISSION       DVT Prophylaxis: Pneumatic Compression Devices.  Code Status: Full Code    Disposition: Expected discharge in1 days, likely needs placement pending further evaluation with PT/OT.  The underlying dementia is complicating her overall Care.  I discussed with her  who stated if she improves he would like to take her home. He is waiting PT eval to see how she does.    Lee Nagel MD    Interval History   Patient seen  and examined, more awake and coherent,  at bedsides, H&P, labs, imaging, medications reviewed by me. She has a sitter at bedside, risk of fall.    -Data reviewed today: I reviewed all new labs and imaging results over the last 24 hours. I personally reviewed .    Physical Exam   Temp: 98  F (36.7  C) Temp src: Temporal BP: (!) 160/89 Pulse: 77   Resp: 16 SpO2: 98 % O2 Device: None (Room air)    Vitals:    01/10/23 1154   Weight: 59 kg (130 lb)     Vital Signs with Ranges  Temp:  [97.6  F (36.4  C)-99.4  F (37.4  C)] 98  F (36.7  C)  Pulse:  [55-90] 77  Resp:  [16-20] 16  BP: (146-202)/() 160/89  SpO2:  [91 %-98 %] 98 %  I/O last 3 completed shifts:  In: 490 [P.O.:490]  Out: 700 [Urine:700]    Constitutional: Awake and alert, follows instructions, no agitation.  Respiratory: Good air entry bilaterally, no wheezing crackles or rales.  Cardiovascular: S1 and S2 regular, no gallop or murmur.  GI: Soft, nontender, nondistended, positive bowel sounds.  Skin/Integumen: No rash or exanthems.  Neuror: Awake, moves all extremities, no focal deficits on gross examination.      Medications     sodium chloride Stopped (01/11/23 1202)       amLODIPine  5 mg Oral Daily     levothyroxine  112 mcg Oral Daily     lidocaine  1 patch Transdermal Q24H     lidocaine   Transdermal Q8H LESLEY     QUEtiapine  25 mg Oral QAM     sodium chloride (PF)  3 mL Intracatheter Q8H       Data   Recent Labs   Lab 01/10/23  1155   WBC 8.8   HGB 12.2   MCV 99      INR 1.02      POTASSIUM 3.9   CHLORIDE 102   CO2 26   BUN 25.5*   CR 1.07*   ANIONGAP 13   MYNOR 9.8   *       Recent Results (from the past 24 hour(s))   XR Lumbar Spine 2/3 Views    Narrative    LUMBAR SPINE TWO TO THREE VIEWS January 11, 2023 2:09 PM     HISTORY: L1 fracture. Upright XR in brace.    COMPARISON: Lumbar spine CT 1/10/2023.      Impression    IMPRESSION: Acute L1 burst fracture with moderate height loss. Chronic  L3 compression fracture with mild  height loss. Alignment appears  unchanged. Brace in place.    JESSICA BARRAGAN MD         SYSTEM ID:  [id number]

## 2023-01-12 NOTE — PLAN OF CARE
From ED at 1420, confused often impulsive, required freq. reorientations/redirections, need sitter.  High BPs max 200/100s pt gets very tensed & restless, hard to get a true BP.  Interm. mild pain managed with repo/rest, declined need PRN med & cold pack.  No nausea.  LS clear bilat., RA.  Up A1 to BSC, need belt walker + TLSO brace on but confused & refused.  Voiding.

## 2023-01-12 NOTE — PLAN OF CARE
Goal Outcome Evaluation:      Plan of Care Reviewed With: patient, spouse    Overall Patient Progress: no changeOverall Patient Progress: no change    Pt alert but disoriented to time, place, and situation. Impulsive and forgetful. VSS except elevated BP, new order for Norvasc daily. Denies pain. CMS intact. Sitter at bedside. A1 with walker/GB and back brace OOB. Regular diet. Voiding adequately. IV SL. Lido patch to lower back. Plan to discharge to TCU when medically stable.

## 2023-01-13 ENCOUNTER — APPOINTMENT (OUTPATIENT)
Dept: OCCUPATIONAL THERAPY | Facility: CLINIC | Age: 73
DRG: 542 | End: 2023-01-13
Payer: COMMERCIAL

## 2023-01-13 ENCOUNTER — APPOINTMENT (OUTPATIENT)
Dept: PHYSICAL THERAPY | Facility: CLINIC | Age: 73
DRG: 542 | End: 2023-01-13
Attending: INTERNAL MEDICINE
Payer: COMMERCIAL

## 2023-01-13 PROCEDURE — 120N000001 HC R&B MED SURG/OB

## 2023-01-13 PROCEDURE — 99232 SBSQ HOSP IP/OBS MODERATE 35: CPT | Performed by: INTERNAL MEDICINE

## 2023-01-13 PROCEDURE — 97162 PT EVAL MOD COMPLEX 30 MIN: CPT | Mod: GP | Performed by: PHYSICAL THERAPIST

## 2023-01-13 PROCEDURE — 250N000013 HC RX MED GY IP 250 OP 250 PS 637: Performed by: INTERNAL MEDICINE

## 2023-01-13 PROCEDURE — 250N000013 HC RX MED GY IP 250 OP 250 PS 637: Performed by: HOSPITALIST

## 2023-01-13 PROCEDURE — 97530 THERAPEUTIC ACTIVITIES: CPT | Mod: GP | Performed by: PHYSICAL THERAPIST

## 2023-01-13 PROCEDURE — 97535 SELF CARE MNGMENT TRAINING: CPT | Mod: GO

## 2023-01-13 RX ORDER — NALOXONE HYDROCHLORIDE 0.4 MG/ML
0.2 INJECTION, SOLUTION INTRAMUSCULAR; INTRAVENOUS; SUBCUTANEOUS
Status: DISCONTINUED | OUTPATIENT
Start: 2023-01-13 | End: 2023-01-14 | Stop reason: HOSPADM

## 2023-01-13 RX ORDER — NALOXONE HYDROCHLORIDE 0.4 MG/ML
0.4 INJECTION, SOLUTION INTRAMUSCULAR; INTRAVENOUS; SUBCUTANEOUS
Status: DISCONTINUED | OUTPATIENT
Start: 2023-01-13 | End: 2023-01-14 | Stop reason: HOSPADM

## 2023-01-13 RX ADMIN — Medication 1 MG: at 22:34

## 2023-01-13 RX ADMIN — LEVOTHYROXINE SODIUM 112 MCG: 0.11 TABLET ORAL at 05:39

## 2023-01-13 RX ADMIN — QUETIAPINE FUMARATE 25 MG: 25 TABLET ORAL at 09:20

## 2023-01-13 RX ADMIN — AMLODIPINE BESYLATE 5 MG: 5 TABLET ORAL at 09:20

## 2023-01-13 RX ADMIN — LIDOCAINE 1 PATCH: 246 PATCH TOPICAL at 05:39

## 2023-01-13 ASSESSMENT — ACTIVITIES OF DAILY LIVING (ADL)
ADLS_ACUITY_SCORE: 48
CHANGE_IN_FUNCTIONAL_STATUS_SINCE_ONSET_OF_CURRENT_ILLNESS/INJURY: YES
BATHING: 1-->ASSISTANCE NEEDED
ADLS_ACUITY_SCORE: 48
ADLS_ACUITY_SCORE: 49
HEARING_DIFFICULTY_OR_DEAF: NO
DIFFICULTY_COMMUNICATING: NO
TRANSFERRING: 1-->ASSISTANCE (EQUIPMENT/PERSON) NEEDED (NOT DEVELOPMENTALLY APPROPRIATE)
DRESS: 1-->ASSISTANCE (EQUIPMENT/PERSON) NEEDED (NOT DEVELOPMENTALLY APPROPRIATE)
TOILETING: 1-->ASSISTANCE (EQUIPMENT/PERSON) NEEDED
DRESSING/BATHING: BATHING DIFFICULTY, ASSISTANCE 1 PERSON;DRESSING DIFFICULTY, ASSISTANCE 1 PERSON
TRANSFERRING: 1-->ASSISTANCE (EQUIPMENT/PERSON) NEEDED
ADLS_ACUITY_SCORE: 48
ADLS_ACUITY_SCORE: 50
ADLS_ACUITY_SCORE: 48
DRESS: 1-->ASSISTANCE (EQUIPMENT/PERSON) NEEDED
DIFFICULTY_EATING/SWALLOWING: NO
TOILETING: 1-->ASSISTANCE (EQUIPMENT/PERSON) NEEDED (NOT DEVELOPMENTALLY APPROPRIATE)
DOING_ERRANDS_INDEPENDENTLY_DIFFICULTY: YES
ADLS_ACUITY_SCORE: 49
FALL_HISTORY_WITHIN_LAST_SIX_MONTHS: YES
WEAR_GLASSES_OR_BLIND: YES
ADLS_ACUITY_SCORE: 49
ADLS_ACUITY_SCORE: 50
TOILETING_ASSISTANCE: TOILETING DIFFICULTY, ASSISTANCE 1 PERSON
DRESSING/BATHING_DIFFICULTY: YES
ADLS_ACUITY_SCORE: 48
ADLS_ACUITY_SCORE: 48
WALKING_OR_CLIMBING_STAIRS: AMBULATION DIFFICULTY, REQUIRES EQUIPMENT
ADLS_ACUITY_SCORE: 48
CONCENTRATING,_REMEMBERING_OR_MAKING_DECISIONS_DIFFICULTY: YES
WALKING_OR_CLIMBING_STAIRS_DIFFICULTY: YES
TOILETING_ISSUES: YES
NUMBER_OF_TIMES_PATIENT_HAS_FALLEN_WITHIN_LAST_SIX_MONTHS: 2

## 2023-01-13 NOTE — PROGRESS NOTES
01/13/23 1357   Appointment Info   Signing Clinician's Name / Credentials (PT) Mckenna Fonseca DPT   Living Environment   People in Home spouse   Current Living Arrangements house   Home Accessibility stairs to enter home;stairs within home   Number of Stairs, Main Entrance 1   Number of Stairs, Within Home, Primary greater than 10 stairs   Transportation Anticipated family or friend will provide   Living Environment Comments Pt lives with spouse   Self-Care   Usual Activity Tolerance good   Current Activity Tolerance moderate   Regular Exercise Yes   Activity/Exercise Type walking   Exercise Amount/Frequency daily   Equipment Currently Used at Home none   Fall history within last six months yes   Number of times patient has fallen within last six months 2   Activity/Exercise/Self-Care Comment Spouse provided info regarding PLOF: Spouse assists pt with dressing (orientation of clothes, etc) and IADLs (homemaking, driving, med mgmt, etc). Pt ambulates independently without AD at baseline.   General Information   Onset of Illness/Injury or Date of Surgery 01/10/23   Referring Physician Lee Nagel MD   Pertinent History of Current Problem (include personal factors and/or comorbidities that impact the POC) Elsa Jackson is a 72 year old female with PMH of hypothyroidism, osteoarthritis, dementia who was brought to the emergency room by her  after episode of fall and some degree of loss of consciousness or unresponsiveness and was found to have L1 burst type fracture and admitted to hospital on 1/10/2023.   Existing Precautions/Restrictions spinal;brace worn when out of bed   Cognition   Affect/Mental Status (Cognition) low arousal/lethargic  (difficult to keep alert during session)   Orientation Status (Cognition) disoriented to;person;place;situation;time   Follows Commands (Cognition) follows one-step commands;increased processing time needed;initiation impaired;physical/tactile prompts  required;repetition of directions required;verbal cues/prompting required;delayed response/completion  (Pt did best with very direct and firm cues)   Pain Assessment   Patient Currently in Pain Yes, see Vital Sign flowsheet   Posture    Posture Forward head position;Protracted shoulders   Range of Motion (ROM)   Range of Motion ROM deficits secondary to pain   Strength (Manual Muscle Testing)   Strength (Manual Muscle Testing) Deficits observed during functional mobility   Bed Mobility   Comment, (Bed Mobility) mod A x 1 for supine to sit   Transfers   Comment, (Transfers) min A x 1 sit to stand, but flutuated throughout session   Gait/Stairs (Locomotion)   Comment, (Gait/Stairs) mod A needed throughout to enable safe ambulation, 4WW trialed   Balance   Balance Comments very poor balance, cognition impairing ability to safely ambulate   Clinical Impression   Criteria for Skilled Therapeutic Intervention Yes, treatment indicated   PT Diagnosis (PT) decreased functional mobility   Influenced by the following impairments decreased strength, decreased ROM, pain, lethagy, cognition   Functional limitations due to impairments decreased safe mobility at times even needing up to A x 2   Clinical Presentation (PT Evaluation Complexity) Evolving/Changing   Clinical Presentation Rationale more lethargic today, more confusion per OT   Clinical Decision Making (Complexity) moderate complexity   Planned Therapy Interventions (PT) bed mobility training;gait training;home exercise program;strengthening;stair training;transfer training;risk factor education;home program guidelines;progressive activity/exercise   Anticipated Equipment Needs at Discharge (PT) walker, rolling  (4WW? re-assess tomorrow)   Risk & Benefits of therapy have been explained evaluation/treatment results reviewed;care plan/treatment goals reviewed;risks/benefits reviewed;participants voiced agreement with care plan;current/potential barriers  reviewed;participants included;patient   PT Total Evaluation Time   PT Eval, Moderate Complexity Minutes (99963) 5   Physical Therapy Goals   PT Frequency Daily   PT Predicted Duration/Target Date for Goal Attainment 01/16/23   PT Goals Bed Mobility;Transfers;Gait   PT: Bed Mobility Modified independent;Supine to/from sit;Rolling;Within precautions   PT: Transfers Modified independent;Within precautions;Assistive device;Sit to/from stand;Bed to/from chair   PT: Gait Modified independent;Rolling walker;100 feet;Within precautions   Interventions   Interventions Quick Adds Therapeutic Activity   Therapeutic Activity   Therapeutic Activities: dynamic activities to improve functional performance Minutes (01270) 25   Symptoms Noted During/After Treatment Dizziness  (lethargic)   Treatment Detail/Skilled Intervention Pt was cued for supine<>sit, physical cues throughout as not responding to verbal cues. Pt was cued for sit <>stand with 4WW, needing very concrete, simple and firm commands for pt to follow. Pt needing up to mod A for safety, attempting without AD but unable to even attempt steps away from bed. Pt was cued for ambulation in hallway, needing constant mod A through 4WW to counterbalance walker in order to maintain safe pace, safe turning. Pt needing therapist to continue to be very firm and direct with cues for navigation, no LOB.   PT Discharge Planning   PT Plan Continue to trial 4WW? stairs   PT Discharge Recommendation (DC Rec) home with assist;home with home care physical therapy   PT Rationale for DC Rec Pt likely able to improve if medically improving/improves lethargy. Pt currently needing up to A x 2, but very lethargic with activity today. Pt poor command following, do not believe she would be a good TCU canidate in her current condition.   PT Brief overview of current status Needing mod A for basic mobility items   Total Session Time   Timed Code Treatment Minutes 25   Total Session Time (sum of  timed and untimed services) 30

## 2023-01-13 NOTE — CONSULTS
Care Management Initial Consult    General Information  Assessment completed with: Spouse or significant other,    Type of CM/SW Visit: Initial Assessment    Primary Care Provider verified and updated as needed: Yes   Readmission within the last 30 days:        Reason for Consult: care coordination/care conference, discharge planning  Advance Care Planning:            Communication Assessment  Patient's communication style: spoken language (English or Bilingual)    Hearing Difficulty or Deaf: no        Cognitive  Cognitive/Neuro/Behavioral: .WDL except  Level of Consciousness: confused  Arousal Level: opens eyes spontaneously  Orientation: disoriented to, place, time, situation  Mood/Behavior: calm, cooperative  Best Language: 0 - No aphasia  Speech: slow, illogical    Living Environment:   People in home: spouse     Current living Arrangements: house      Able to return to prior arrangements: yes       Family/Social Support:  Care provided by: spouse/significant other, self  Provides care for: no one  Marital Status:     Maykel       Description of Support System: Supportive, Involved       is primary caregiver. They have x1 son. Son is in the  and lives out of state.    Current Resources:   Patient receiving home care services: No     Community Resources:    Equipment currently used at home: none  Supplies currently used at home:      Employment/Financial:  Employment Status: retired        Financial Concerns: No concerns identified        Lifestyle & Psychosocial Needs:  Social Determinants of Health     Tobacco Use: Not on file   Alcohol Use: Not on file   Financial Resource Strain: Not on file   Food Insecurity: Not on file   Transportation Needs: Not on file   Physical Activity: Not on file   Stress: Not on file   Social Connections: Not on file   Intimate Partner Violence: Not on file   Depression: Not on file   Housing Stability: Not on file       Functional Status:  Prior to  admission patient needed assistance:   Dependent ADLs:: Bathing, Dressing, Grooming  Dependent IADLs:: Cleaning, Cooking, Shopping, Meal Preparation, Medication Management, Transportation, Money Management       Mental Health Status:  Mental Health Status: No Current Concerns       Chemical Dependency Status:  Chemical Dependency Status: No Current Concerns             Values/Beliefs:  Spiritual, Cultural Beliefs, [employer] Practices, Values that affect care: no               Additional Information:  CM consulted for discharge planning. Patient admitted after fall with acute L1 burst type fracture; no surg recommended, TLSO brace placed. Therapy following and recommending home care.     CM met with patient and  at bedside. Pt sleepy, also has h/o baseline dementia. Patient lives in [facility] home with her .  reports patient ambulates without walker or cane.  assist with grooming and cares. Patient does not have home care or DME. Discussed therapy recommendation of home care and explained HC services.  reports they are interested in home care services. Information given on Home care choice. He was agreeable to referrals sent to [facility] agencies. Referrals sent for PT OT SN A.      confirms patient has a [facility] [place] PCP.     Family plans to provide transportation.     Alvin Puente RN Case Manager  Inpatient Care Coordination   [facility]   [phone]      Alvin Puente RN      Addendum [id number]: Received call from [facility], Kathy. [facility] is at capacity and unable to accept at this time.     Patient has pending Home Care referrals to [facility] and I.

## 2023-01-13 NOTE — PROGRESS NOTES
[facility]    Hospitalist Progress Note    Date of Service (when I saw the patient): 01/13/2023    Assessment & Plan   Elsa Jackson is a 72 year old female with PMH of hypothyroidism, osteoarthritis, dementia who was brought to the emergency room by her  after episode of fall and some degree of loss of consciousness or unresponsiveness and was found to have L1 burst type fracture and admitted to hospital on 1/10/2023.   Was also reported the patient apparently fell down 4 days ago in the bathroom that was not witnessed.  Patient complained of pain in her lower back,    1.  Fall suspected mechanical.  2.  Closed L1 burst type fracture.  3.  Chronic T8, L3 compression fracture  3.   Dementia with episodes of behavioral disturbance.  -She is more awake and alert today.  -Continue close monitoring, risk of fall.  -Neurosurgery consulted recommended nonsurgical conservative measures.  -TLSO brace when out of bed..  -Pain is fairly controlled.  -Activity, PT/OT Per neurosurgery.  -She got Seroquel in AM, and mostly sleep today. Discontinued it.   -Ativan 0.5 mg IV and Haldol to 2mg IV every 6 hours as needed for agitation, doing better today..  -Call MD to adjust medication if needed for agitation.    4.  Hypothyroidism: Continue synthroid.    5. HTN: Started on Amlodipine. Added hydralazine with parameters.  6. Mildly elevated Troponin: Trop is flat. No clinical significance.     Clinically Significant Risk Factors                         # Moderate Malnutrition: based on nutrition assessment, PRESENT ON ADMISSION       DVT Prophylaxis: Pneumatic Compression Devices.  Code Status: Full Code    Disposition: Expected discharge in 1 day to home if mental status is stable activity level improves.  The underlying dementia is complicating her overall Care.  I discussed with her  at length at bedside, due to change in mental status, the discharge plan for today was cancelled, if she  improves she can be discharged tomorrow otherwise needs to consider for TCU.  Her  prefers to discharge her home.    Lee Nagel MD    Interval History   Patient seen and examined, she is more sleepy, but able to open her eyes and follow instructions.no new overnight issues,   and other family members at bedsides, H&P, labs, imaging, medications reviewed by me. She has a sitter at bedside, risk of fall.    -Data reviewed today: I reviewed all new labs and imaging results over the last 24 hours. I personally reviewed .    Physical Exam   Temp: 98.2  F (36.8  C) Temp src: Temporal BP: (!) 143/83 Pulse: 81   Resp: 18 SpO2: 97 % O2 Device: None (Room air)    Vitals:    01/10/23 1154   Weight: 59 kg (130 lb)     Vital Signs with Ranges  Temp:  [96.7  F (35.9  C)-98.2  F (36.8  C)] 98.2  F (36.8  C)  Pulse:  [81-87] 81  Resp:  [16-18] 18  BP: (143-169)/(83-96) 143/83  SpO2:  [97 %-98 %] 97 %  I/O last 3 completed shifts:  In: 420 [P.O.:420]  Out: -     Constitutional: Awake and alert, follows instructions, no agitation.  Respiratory: Good air entry bilaterally, no wheezing crackles or rales.  Cardiovascular: S1 and S2 regular, no gallop or murmur.  GI: Soft, nontender, nondistended, positive bowel sounds.  Skin/Integumen: No rash or exanthems.  Neuror: Awake, moves all extremities, no focal deficits on gross examination.      Medications     sodium chloride Stopped (01/11/23 1202)       amLODIPine  5 mg Oral Daily     levothyroxine  112 mcg Oral Daily     lidocaine  1 patch Transdermal Q24H     lidocaine   Transdermal Q8H LESLEY     [START ON 1/14/2023] QUEtiapine  12.5 mg Oral QAM     sodium chloride (PF)  3 mL Intracatheter Q8H       Data   Recent Labs   Lab 01/10/23  1155   WBC 8.8   HGB 12.2   MCV 99      INR 1.02      POTASSIUM 3.9   CHLORIDE 102   CO2 26   BUN 25.5*   CR 1.07*   ANIONGAP 13   MYNOR 9.8   *       No results found for this or any previous visit (from the past 24  hour(s)).

## 2023-01-13 NOTE — PLAN OF CARE
Pt alert after breakfast, then fell asleep for most of the day with exception of PT and OT visits. Report from last night that patient did not sleep, per conversation with  give melatonin tonight at 8pm. Confused, sitter in room. Denies pain. Assist of 1 with gait belt and walker. Back brace to be worn when up.

## 2023-01-13 NOTE — PLAN OF CARE
Pleasantly confused often impulsive, easily redirected, sitter.  Afebrile.  Denies pain.  No nausea.  LS clear bilat., RA, encouraged hourly CDB/IS x10.  Denies N/T.  Up SBA belt + walker, TLSO brace OOB.  Sat up chair for meals, ambulating.  Voiding, LBM today.  PT/OT/SW.

## 2023-01-13 NOTE — PLAN OF CARE
Goal Outcome Evaluation:      Plan of Care Reviewed With: patient, spouse    Overall Patient Progress: no changeOverall Patient Progress: no change    Pt alert but disoriented to time, place, and situation. Denies pain. CMS intact. VSS except elevated BP. A1-2 with walker/GB and back brace OOB. Needs constant redirection. Sitter at bedside for safety. IV SL. PT/OT following. Discharge TBD.

## 2023-01-14 ENCOUNTER — APPOINTMENT (OUTPATIENT)
Dept: OCCUPATIONAL THERAPY | Facility: CLINIC | Age: 73
DRG: 542 | End: 2023-01-14
Payer: COMMERCIAL

## 2023-01-14 ENCOUNTER — APPOINTMENT (OUTPATIENT)
Dept: PHYSICAL THERAPY | Facility: CLINIC | Age: 73
DRG: 542 | End: 2023-01-14
Payer: COMMERCIAL

## 2023-01-14 VITALS
HEART RATE: 90 BPM | BODY MASS INDEX: 20.4 KG/M2 | HEIGHT: 67 IN | TEMPERATURE: 98.7 F | OXYGEN SATURATION: 96 % | SYSTOLIC BLOOD PRESSURE: 141 MMHG | WEIGHT: 130 LBS | RESPIRATION RATE: 18 BRPM | DIASTOLIC BLOOD PRESSURE: 82 MMHG

## 2023-01-14 PROCEDURE — 97530 THERAPEUTIC ACTIVITIES: CPT | Mod: GO

## 2023-01-14 PROCEDURE — 99239 HOSP IP/OBS DSCHRG MGMT >30: CPT | Performed by: INTERNAL MEDICINE

## 2023-01-14 PROCEDURE — 97535 SELF CARE MNGMENT TRAINING: CPT | Mod: GO

## 2023-01-14 PROCEDURE — 250N000013 HC RX MED GY IP 250 OP 250 PS 637: Performed by: INTERNAL MEDICINE

## 2023-01-14 PROCEDURE — 97530 THERAPEUTIC ACTIVITIES: CPT | Mod: GP | Performed by: PHYSICAL THERAPIST

## 2023-01-14 PROCEDURE — 250N000013 HC RX MED GY IP 250 OP 250 PS 637: Performed by: HOSPITALIST

## 2023-01-14 RX ORDER — ACETAMINOPHEN 500 MG
500-1000 TABLET ORAL EVERY 6 HOURS PRN
Refills: 0 | Status: ON HOLD
Start: 2023-01-14 | End: 2024-01-01

## 2023-01-14 RX ADMIN — LIDOCAINE 1 PATCH: 246 PATCH TOPICAL at 05:46

## 2023-01-14 RX ADMIN — AMLODIPINE BESYLATE 5 MG: 5 TABLET ORAL at 08:36

## 2023-01-14 RX ADMIN — LEVOTHYROXINE SODIUM 112 MCG: 0.11 TABLET ORAL at 05:46

## 2023-01-14 ASSESSMENT — ACTIVITIES OF DAILY LIVING (ADL)
ADLS_ACUITY_SCORE: 49
ADLS_ACUITY_SCORE: 46
ADLS_ACUITY_SCORE: 50
ADLS_ACUITY_SCORE: 50

## 2023-01-14 NOTE — PROGRESS NOTES
Care Management Discharge Note    Discharge Date: 01/14/2023       Discharge Disposition: Home Care    Discharge Services: Transportation Services    Discharge DME: None    Discharge Transportation: family or friend will provide    Private pay costs discussed: Not applicable    Additional Information:  SW spoke with intake at [facility] & review referral information. [facility] has accepted the referral for home care for home PT, OT, HHA, & SN services.         SUAD Monroy

## 2023-01-14 NOTE — PROGRESS NOTES
Physical Therapy Discharge Summary    Reason for therapy discharge:    Discharged to home with home therapy.    Progress towards therapy goal(s). See goals on Care Plan in [facility] electronic health record for goal details.  Goals not met.  Barriers to achieving goals:   discharge from facility.    Therapy recommendation(s):    Continued therapy is recommended.  Rationale/Recommendations:  HHPT for strengthening, balance, safety/IND with mobility on level ground and on stairs.

## 2023-01-14 NOTE — DISCHARGE SUMMARY
"[facility]  Discharge Summary    Admit date:  1/10/2023    Discharge date and time: 01/14/23 1213    Discharge Physician: Frank Still MD    Primary care provider: [facility] [place]    Primary Discharge Diagnosis      Closed L1 burst type fracture    Secondary Diagnoses     Ground Level Fall  Acute Metabolic Encephalopathy 2/2 narcotic reaction superimposed on dementia  Dementia with episodes of behavioral disturbance  Hypertensive Urgency  Hypothyroidism  Type II MI 2/2 demand ischemia in setting of ground-level fall and burst fracture   Trop adynamic, which doesn't support Type I MI, but still clinically significant    Summary of Hospital Stay     72F with hx of dementia and hypothyroidism presented after a ground level fall and found to have a closed L1 burst fracture. Neurosurgery was consulted and recommended conservative management with TLSO brace. She will be discharged with home health therapies and follow-up with neurosurgery on an outpatient basis.    Patient Discharge Condition & Exam     Discharge condition: Improved    BP (!) 141/82 (BP Location: Right arm)   Pulse 90   Temp 98.7  F (37.1  C) (Temporal)   Resp 18   Ht 1.702 m (5' 7\")   Wt 59 kg (130 lb)   SpO2 96%   BMI 20.36 kg/m       General: In NAD.  Cardiac: RRR.  Lungs: CTAB.  MSK: Back brace in place.  Abd: Non-tender.  Ext: No edema.    Discharge Instructions     Patient/family instructions: Written discharge instruction given to patient/family    Discharge Medications:     Review of your medicines      START taking      Dose / Directions   acetaminophen 500 MG tablet  Commonly known as: TYLENOL      Dose: 500-1,000 mg  Take 1-2 tablets (500-1,000 mg) by mouth every 6 hours as needed for mild pain  Refills: 0        CONTINUE these medicines which have NOT CHANGED      Dose / Directions   levothyroxine 112 MCG tablet  Commonly known as: SYNTHROID/LEVOTHROID      Dose: 112 mcg  Take 112 mcg by mouth daily  Refills: " 0        STOP taking    QUEtiapine 25 MG tablet  Commonly known as: SEROquel              Discharge diet: Regular    Discharge activity: Activity as tolerated    Discharge follow-up:    Follow up with primary care provider within one week or earlier if symptoms return or gets worse.    Follow up with consultant as instructed.    Pending Results     Unresulted Labs Ordered in the Past 30 Days of this Admission     No orders found from 12/11/2022 to 1/11/2023.           Patient Allergies   No Known Allergies    Disposition   Disposition: Home with home health     I saw and evaluated the patient on day of discharge and  discharge instructions reviewed  and  all the patient's questions and concerns addressed. Over 30 minutes spent on discharge and coordination of discharge process for this patient.

## 2023-01-14 NOTE — PLAN OF CARE
Occupational Therapy Discharge Summary    Reason for therapy discharge:    Discharged to home with home therapy.    Progress towards therapy goal(s). See goals on Care Plan in [facility] electronic health record for goal details.  Goals partially met.  Barriers to achieving goals:   discharge from facility.    Therapy recommendation(s):    Continued therapy is recommended.  Rationale/Recommendations:   OT for ongoing strengthening and safety. Pt will require Ax1 for all transfers/mobility and ADLs in order to maintain spinal precautions. Recommend shower chair for improved safety in home environment..

## 2023-01-14 NOTE — PLAN OF CARE
[id number]    Pt alert to self only. Sitter at bedside. IV SL. Denies pain. Up Ax1 GB + walker. Pt was calm and cooperative when given her morning meds this am. She was able to sleep fairly well overnight. Will continue plan of care.

## 2023-01-14 NOTE — PLAN OF CARE
Goal Outcome Evaluation:  Patient vital signs are at baseline: Yes  Patient able to ambulate as they were prior to admission or with assist devices provided by therapies during their stay:  Yes  Patient MUST void prior to discharge:  Yes  Patient able to tolerate oral intake:  Yes  Pain has adequate pain control using Oral analgesics:  Yes  Does patient have an identified :  Yes  Has goal D/C date and time been discussed with patient:  Yes    A&Ox3, forgetful.  VSS on RA.  Reports minimal pain, declined interventions.  Up with SBA/walker/gait belt, brace on when out of bed.   able to apply brace accurately. He is involved in cares, supportive.  Tolerating regular diet.  Voiding without difficulty, incontinent overnight.  CMS intact.  Denies numbness/tingling.  Up in chair.  PIV removed.  Discharge instructions reviewed with patient and her , no questions, stated good understanding of all information including follow up appt and C orders.  All personal belongings with patient at discharge.  Discharged home in c/o .

## 2023-01-14 NOTE — DISCHARGE INSTRUCTIONS
[facility] will be providing home physical therapy, occupational therapy, a home health aide, & skilled nurse after discharge. You can reach them with any questions at [phone].

## 2023-01-14 NOTE — PLAN OF CARE
Goal Outcome Evaluation:    3pm-11pm RN    Patient VS are at baseline: Yes  Patient able to ambulate as they were prior to admission or with assist devices provided by therapy during their stay: Yes  Patient must void prior to discharge: Yes  Patient able to tolerate oral intake: Yes  Patient has adequate pain control using oral analgesics: Denies pain    Patient disoriented to time place and situation. Denied pain, was up on chair this afternoon/evening with  visiting. Has TLSO brace on when out of bed. Sitter at bedside. Discharge planning in progress.

## 2023-01-17 ENCOUNTER — PATIENT OUTREACH (OUTPATIENT)
Dept: CARE COORDINATION | Facility: CLINIC | Age: 73
End: 2023-01-17
Payer: COMMERCIAL

## 2023-01-17 NOTE — PROGRESS NOTES
"Clinic Care Coordination Contact  [facility]: Post-Discharge Note  SITUATION                                                      Admission:    Admission Date: 01/10/23   Reason for Admission: Closed L1 burst type fracture Ground Level Fall  Acute Metabolic Encephalopathy  Dementia with episodes of behavioral disturbance  Hypertensive Urgency  Hypothyroidism  Type II MI  Discharge:   Discharge Date: 01/14/23  Discharge Diagnosis: Closed L1 burst type fracture Ground Level Fall  Acute Metabolic Encephalopathy  Dementia with episodes of behavioral disturbance  Hypertensive Urgency  Hypothyroidism  Type II MI    BACKGROUND                                                      Per hospital discharge summary and inpatient provider notes:    Mainly obtained from the ER records.  I did attempt to get in touch with her , however, I was unsuccessful.  The patient is quite sedated. This is a 72-year-old white female with a history of dementia that is thought to be either Lewy body or frontotemporal, on Seroquel, hypothyroidism, who was brought in by her  for a fall.  The patient apparently fell 4 days ago in the bathroom that he did not witness.  She had some soreness in the lower back.  Then, this morning she was found to be unresponsive and confused on the floor next to the bed and incontinent of urine, which is abnormal for her.  It took a few minutes for her to regain responsiveness.    ASSESSMENT      Discharge Assessment  How are you doing now that you are home?: \"Doing good\"  How are your symptoms? (Red Flag symptoms escalate to triage hotline per guidelines): Improved  Do you feel your condition is stable enough to be safe at home until your provider visit?: Yes  Does the patient have their discharge instructions? : Yes  Does the patient have questions regarding their discharge instructions? : No  Were you started on any new medications or were there changes to any of your previous medications? : " Yes  Does the patient have all of their medications?: Yes  Do you have questions regarding any of your medications? : No  Do you have all of your needed medical supplies or equipment (DME)?  (i.e. oxygen tank, CPAP, cane, etc.): Yes  Discharge follow-up appointment scheduled within 14 calendar days? : Yes  Discharge Follow Up Appointment Date: 01/18/23  Discharge Follow Up Appointment Scheduled with?: Primary Care Provider    Post-op ([facility] CTA Only)  If the patient had a surgery or procedure, do they have any questions for a nurse?: No    PLAN                                                      Outpatient Plan:    Follow up with primary care provider within one week or earlier if symptoms return or gets worse.     Follow up with consultant as instructed.    No future appointments.      For any urgent concerns, please contact our 24 hour nurse triage line: [phone])         AMBER Chiu  [phone]  [facility]

## 2023-04-01 ENCOUNTER — HEALTH MAINTENANCE LETTER (OUTPATIENT)
Age: 73
End: 2023-04-01

## 2024-01-01 ENCOUNTER — APPOINTMENT (OUTPATIENT)
Dept: CT IMAGING | Facility: CLINIC | Age: 74
DRG: 057 | End: 2024-01-01
Attending: EMERGENCY MEDICINE
Payer: COMMERCIAL

## 2024-01-01 ENCOUNTER — PATIENT OUTREACH (OUTPATIENT)
Dept: CARE COORDINATION | Facility: CLINIC | Age: 74
End: 2024-01-01
Payer: COMMERCIAL

## 2024-01-01 ENCOUNTER — APPOINTMENT (OUTPATIENT)
Dept: PHYSICAL THERAPY | Facility: CLINIC | Age: 74
DRG: 690 | End: 2024-01-01
Payer: COMMERCIAL

## 2024-01-01 ENCOUNTER — HEALTH MAINTENANCE LETTER (OUTPATIENT)
Age: 74
End: 2024-01-01

## 2024-01-01 ENCOUNTER — APPOINTMENT (OUTPATIENT)
Dept: GENERAL RADIOLOGY | Facility: CLINIC | Age: 74
DRG: 690 | End: 2024-01-01
Attending: EMERGENCY MEDICINE
Payer: COMMERCIAL

## 2024-01-01 ENCOUNTER — LAB REQUISITION (OUTPATIENT)
Dept: LAB | Facility: CLINIC | Age: 74
End: 2024-01-01
Payer: COMMERCIAL

## 2024-01-01 ENCOUNTER — APPOINTMENT (OUTPATIENT)
Dept: GENERAL RADIOLOGY | Facility: CLINIC | Age: 74
DRG: 057 | End: 2024-01-01
Attending: PHYSICIAN ASSISTANT
Payer: COMMERCIAL

## 2024-01-01 ENCOUNTER — HOSPITAL ENCOUNTER (INPATIENT)
Facility: CLINIC | Age: 74
LOS: 6 days | Discharge: NURSING, REHAB OR LONG-TERM CARE | DRG: 057 | End: 2024-09-06
Attending: EMERGENCY MEDICINE | Admitting: INTERNAL MEDICINE
Payer: COMMERCIAL

## 2024-01-01 ENCOUNTER — APPOINTMENT (OUTPATIENT)
Dept: MRI IMAGING | Facility: CLINIC | Age: 74
DRG: 057 | End: 2024-01-01
Attending: PHYSICIAN ASSISTANT
Payer: COMMERCIAL

## 2024-01-01 ENCOUNTER — APPOINTMENT (OUTPATIENT)
Dept: CT IMAGING | Facility: CLINIC | Age: 74
End: 2024-01-01
Attending: EMERGENCY MEDICINE
Payer: COMMERCIAL

## 2024-01-01 ENCOUNTER — TELEPHONE (OUTPATIENT)
Dept: GERIATRICS | Facility: CLINIC | Age: 74
End: 2024-01-01
Payer: COMMERCIAL

## 2024-01-01 ENCOUNTER — APPOINTMENT (OUTPATIENT)
Dept: OCCUPATIONAL THERAPY | Facility: CLINIC | Age: 74
DRG: 057 | End: 2024-01-01
Payer: COMMERCIAL

## 2024-01-01 ENCOUNTER — APPOINTMENT (OUTPATIENT)
Dept: CT IMAGING | Facility: CLINIC | Age: 74
DRG: 690 | End: 2024-01-01
Attending: EMERGENCY MEDICINE
Payer: COMMERCIAL

## 2024-01-01 ENCOUNTER — TRANSITIONAL CARE UNIT VISIT (OUTPATIENT)
Dept: GERIATRICS | Facility: CLINIC | Age: 74
End: 2024-01-01
Payer: COMMERCIAL

## 2024-01-01 ENCOUNTER — APPOINTMENT (OUTPATIENT)
Dept: PHYSICAL THERAPY | Facility: CLINIC | Age: 74
DRG: 057 | End: 2024-01-01
Attending: INTERNAL MEDICINE
Payer: COMMERCIAL

## 2024-01-01 ENCOUNTER — APPOINTMENT (OUTPATIENT)
Dept: PHYSICAL THERAPY | Facility: CLINIC | Age: 74
DRG: 690 | End: 2024-01-01
Attending: INTERNAL MEDICINE
Payer: COMMERCIAL

## 2024-01-01 ENCOUNTER — HOSPITAL ENCOUNTER (EMERGENCY)
Facility: CLINIC | Age: 74
Discharge: HOME | End: 2024-08-17
Attending: EMERGENCY MEDICINE | Admitting: EMERGENCY MEDICINE
Payer: COMMERCIAL

## 2024-01-01 ENCOUNTER — DOCUMENTATION ONLY (OUTPATIENT)
Dept: GERIATRICS | Facility: CLINIC | Age: 74
End: 2024-01-01

## 2024-01-01 ENCOUNTER — HOSPITAL ENCOUNTER (INPATIENT)
Facility: CLINIC | Age: 74
LOS: 2 days | Discharge: HOME HEALTH | DRG: 690 | End: 2024-04-23
Attending: EMERGENCY MEDICINE | Admitting: INTERNAL MEDICINE
Payer: COMMERCIAL

## 2024-01-01 ENCOUNTER — DOCUMENTATION ONLY (OUTPATIENT)
Dept: OTHER | Facility: CLINIC | Age: 74
End: 2024-01-01
Payer: COMMERCIAL

## 2024-01-01 ENCOUNTER — APPOINTMENT (OUTPATIENT)
Dept: OCCUPATIONAL THERAPY | Facility: CLINIC | Age: 74
DRG: 057 | End: 2024-01-01
Attending: HOSPITALIST
Payer: COMMERCIAL

## 2024-01-01 ENCOUNTER — TELEPHONE (OUTPATIENT)
Dept: NURSING | Facility: CLINIC | Age: 74
End: 2024-01-01
Payer: COMMERCIAL

## 2024-01-01 VITALS
DIASTOLIC BLOOD PRESSURE: 68 MMHG | HEART RATE: 62 BPM | HEIGHT: 66 IN | SYSTOLIC BLOOD PRESSURE: 103 MMHG | BODY MASS INDEX: 18.32 KG/M2 | RESPIRATION RATE: 18 BRPM | TEMPERATURE: 97.1 F | OXYGEN SATURATION: 97 % | WEIGHT: 114 LBS

## 2024-01-01 VITALS
HEART RATE: 79 BPM | SYSTOLIC BLOOD PRESSURE: 109 MMHG | TEMPERATURE: 98 F | OXYGEN SATURATION: 98 % | RESPIRATION RATE: 18 BRPM | DIASTOLIC BLOOD PRESSURE: 74 MMHG

## 2024-01-01 VITALS
TEMPERATURE: 97.5 F | OXYGEN SATURATION: 96 % | SYSTOLIC BLOOD PRESSURE: 138 MMHG | DIASTOLIC BLOOD PRESSURE: 81 MMHG | WEIGHT: 120.37 LBS | RESPIRATION RATE: 16 BRPM | BODY MASS INDEX: 19.35 KG/M2 | HEIGHT: 66 IN | HEART RATE: 65 BPM

## 2024-01-01 VITALS
WEIGHT: 129.1 LBS | SYSTOLIC BLOOD PRESSURE: 132 MMHG | TEMPERATURE: 98.3 F | BODY MASS INDEX: 20.22 KG/M2 | HEART RATE: 61 BPM | DIASTOLIC BLOOD PRESSURE: 79 MMHG | OXYGEN SATURATION: 92 % | RESPIRATION RATE: 16 BRPM

## 2024-01-01 DIAGNOSIS — R41.82 ALTERED MENTAL STATUS, UNSPECIFIED ALTERED MENTAL STATUS TYPE: ICD-10-CM

## 2024-01-01 DIAGNOSIS — R31.9 URINARY TRACT INFECTION WITH HEMATURIA, SITE UNSPECIFIED: ICD-10-CM

## 2024-01-01 DIAGNOSIS — K21.9 CHRONIC GASTROESOPHAGEAL REFLUX DISEASE: ICD-10-CM

## 2024-01-01 DIAGNOSIS — S32.030A CLOSED COMPRESSION FRACTURE OF THIRD LUMBAR VERTEBRA (H): ICD-10-CM

## 2024-01-01 DIAGNOSIS — F03.918 WANDERING BEHAVIOR DUE TO DEMENTIA (H): ICD-10-CM

## 2024-01-01 DIAGNOSIS — F02.B2 MODERATE LEWY BODY DEMENTIA WITH PSYCHOTIC DISTURBANCE (H): Primary | ICD-10-CM

## 2024-01-01 DIAGNOSIS — N39.0 ACUTE UTI: ICD-10-CM
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
## 2024-01-01 DIAGNOSIS — M48.54XA NONTRAUMATIC COMPRESSION FRACTURE OF T11 VERTEBRA, INITIAL ENCOUNTER (H): ICD-10-CM

## 2024-01-01 DIAGNOSIS — E03.9 HYPOTHYROIDISM, UNSPECIFIED TYPE: ICD-10-CM

## 2024-01-01 DIAGNOSIS — D64.9 ACUTE ANEMIA: ICD-10-CM

## 2024-01-01 DIAGNOSIS — G93.40 ENCEPHALOPATHY: ICD-10-CM

## 2024-01-01 DIAGNOSIS — R79.89 ELEVATED TROPONIN: ICD-10-CM

## 2024-01-01 DIAGNOSIS — S32.012A CLOSED UNSTABLE BURST FRACTURE OF FIRST LUMBAR VERTEBRA, INITIAL ENCOUNTER (H): Primary | ICD-10-CM

## 2024-01-01 DIAGNOSIS — S32.010D COMPRESSION FRACTURE OF L1 VERTEBRA WITH ROUTINE HEALING, SUBSEQUENT ENCOUNTER: ICD-10-CM

## 2024-01-01 DIAGNOSIS — S32.030D CLOSED COMPRESSION FRACTURE OF L3 LUMBAR VERTEBRA, WITH ROUTINE HEALING, SUBSEQUENT ENCOUNTER: ICD-10-CM

## 2024-01-01 DIAGNOSIS — K59.09 OTHER CONSTIPATION: ICD-10-CM

## 2024-01-01 DIAGNOSIS — G93.40 ENCEPHALOPATHY, UNSPECIFIED: ICD-10-CM

## 2024-01-01 DIAGNOSIS — K59.01 SLOW TRANSIT CONSTIPATION: ICD-10-CM

## 2024-01-01 DIAGNOSIS — Z11.1 ENCOUNTER FOR SCREENING FOR RESPIRATORY TUBERCULOSIS: ICD-10-CM

## 2024-01-01 DIAGNOSIS — R00.1 BRADYCARDIA: ICD-10-CM

## 2024-01-01 DIAGNOSIS — R52 PAIN: Primary | ICD-10-CM

## 2024-01-01 DIAGNOSIS — N17.9 ACUTE KIDNEY INJURY SUPERIMPOSED ON CKD (H): ICD-10-CM

## 2024-01-01 DIAGNOSIS — R50.9 FEVER, UNSPECIFIED FEVER CAUSE: ICD-10-CM

## 2024-01-01 DIAGNOSIS — R40.0 SOMNOLENCE: ICD-10-CM

## 2024-01-01 DIAGNOSIS — G47.00 INSOMNIA, UNSPECIFIED TYPE: ICD-10-CM

## 2024-01-01 DIAGNOSIS — G31.83 MODERATE LEWY BODY DEMENTIA WITH PSYCHOTIC DISTURBANCE (H): Primary | ICD-10-CM

## 2024-01-01 DIAGNOSIS — R74.8 ELEVATED CPK: ICD-10-CM

## 2024-01-01 DIAGNOSIS — Z91.83 WANDERING BEHAVIOR DUE TO DEMENTIA (H): ICD-10-CM
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
## 2024-01-01 DIAGNOSIS — R53.81 PHYSICAL DECONDITIONING: ICD-10-CM

## 2024-01-01 DIAGNOSIS — N39.0 URINARY TRACT INFECTION WITH HEMATURIA, SITE UNSPECIFIED: ICD-10-CM

## 2024-01-01 DIAGNOSIS — N18.9 ACUTE KIDNEY INJURY SUPERIMPOSED ON CKD (H): ICD-10-CM

## 2024-01-01 DIAGNOSIS — G47.09 OTHER INSOMNIA: ICD-10-CM

## 2024-01-01 DIAGNOSIS — F32.A DEPRESSION, UNSPECIFIED DEPRESSION TYPE: ICD-10-CM

## 2024-01-01 DIAGNOSIS — S22.080D COMPRESSION FRACTURE OF T11 VERTEBRA WITH ROUTINE HEALING, SUBSEQUENT ENCOUNTER: ICD-10-CM

## 2024-01-01 DIAGNOSIS — R45.1 AGITATION: ICD-10-CM

## 2024-01-01 LAB
ACINETOBACTER SPECIES: NOT DETECTED
ADV 40+41 DNA STL QL NAA+NON-PROBE: NEGATIVE
ALBUMIN SERPL BCG-MCNC: 4.1 G/DL (ref 3.5–5.2)
ALBUMIN SERPL BCG-MCNC: 4.2 G/DL (ref 3.5–5.2)
ALBUMIN UR-MCNC: 10 MG/DL
ALBUMIN UR-MCNC: 20 MG/DL
ALBUMIN UR-MCNC: 50 MG/DL
ALP SERPL-CCNC: 85 U/L (ref 40–150)
ALP SERPL-CCNC: 90 U/L (ref 40–150)
ALT SERPL W P-5'-P-CCNC: 16 U/L (ref 0–50)
ALT SERPL W P-5'-P-CCNC: 19 U/L (ref 0–50)
AMMONIA PLAS-SCNC: 15 UMOL/L (ref 11–51)
ANION GAP SERPL CALCULATED.3IONS-SCNC: 10 MMOL/L (ref 7–15)
ANION GAP SERPL CALCULATED.3IONS-SCNC: 11 MMOL/L (ref 7–15)
ANION GAP SERPL CALCULATED.3IONS-SCNC: 11 MMOL/L (ref 7–15)
ANION GAP SERPL CALCULATED.3IONS-SCNC: 14 MMOL/L (ref 7–15)
ANION GAP SERPL CALCULATED.3IONS-SCNC: 9 MMOL/L (ref 7–15)
APPEARANCE UR: ABNORMAL
APPEARANCE UR: ABNORMAL
APPEARANCE UR: CLEAR
AST SERPL W P-5'-P-CCNC: 27 U/L (ref 0–45)
AST SERPL W P-5'-P-CCNC: 31 U/L (ref 0–45)
ASTRO TYP 1-8 RNA STL QL NAA+NON-PROBE: NEGATIVE
ATRIAL RATE - MUSE: 65 BPM
ATRIAL RATE - MUSE: 94 BPM
BACTERIA #/AREA URNS HPF: ABNORMAL /HPF
BACTERIA BLD CULT: ABNORMAL
BACTERIA BLD CULT: NO GROWTH
BACTERIA UR CULT: ABNORMAL
BACTERIA UR CULT: NORMAL
BASOPHILS # BLD AUTO: 0 10E3/UL (ref 0–0.2)
BASOPHILS NFR BLD AUTO: 0 %
BASOPHILS NFR BLD AUTO: 1 %
BILIRUB DIRECT SERPL-MCNC: 0.21 MG/DL (ref 0–0.3)
BILIRUB DIRECT SERPL-MCNC: 0.26 MG/DL (ref 0–0.3)
BILIRUB SERPL-MCNC: 1 MG/DL
BILIRUB SERPL-MCNC: 1.1 MG/DL
BILIRUB UR QL STRIP: NEGATIVE
BUN SERPL-MCNC: 18 MG/DL (ref 8–23)
BUN SERPL-MCNC: 18.9 MG/DL (ref 8–23)
BUN SERPL-MCNC: 19.7 MG/DL (ref 8–23)
BUN SERPL-MCNC: 23.3 MG/DL (ref 8–23)
BUN SERPL-MCNC: 29.2 MG/DL (ref 8–23)
BUN SERPL-MCNC: 31 MG/DL (ref 8–23)
BUN SERPL-MCNC: 36.1 MG/DL (ref 8–23)
C CAYETANENSIS DNA STL QL NAA+NON-PROBE: NEGATIVE
CALCIUM SERPL-MCNC: 10 MG/DL (ref 8.8–10.4)
CALCIUM SERPL-MCNC: 8.5 MG/DL (ref 8.8–10.2)
CALCIUM SERPL-MCNC: 8.7 MG/DL (ref 8.8–10.2)
CALCIUM SERPL-MCNC: 8.7 MG/DL (ref 8.8–10.4)
CALCIUM SERPL-MCNC: 8.9 MG/DL (ref 8.8–10.4)
CALCIUM SERPL-MCNC: 9.3 MG/DL (ref 8.8–10.4)
CALCIUM SERPL-MCNC: 9.8 MG/DL (ref 8.8–10.2)
CAMPYLOBACTER DNA SPEC NAA+PROBE: NEGATIVE
CHLORIDE SERPL-SCNC: 101 MMOL/L (ref 98–107)
CHLORIDE SERPL-SCNC: 102 MMOL/L (ref 98–107)
CHLORIDE SERPL-SCNC: 103 MMOL/L (ref 98–107)
CHLORIDE SERPL-SCNC: 106 MMOL/L (ref 98–107)
CHLORIDE SERPL-SCNC: 108 MMOL/L (ref 98–107)
CHLORIDE SERPL-SCNC: 108 MMOL/L (ref 98–107)
CHLORIDE SERPL-SCNC: 110 MMOL/L (ref 98–107)
CITROBACTER SPECIES: NOT DETECTED
CK SERPL-CCNC: 309 U/L (ref 26–192)
CK SERPL-CCNC: 408 U/L (ref 26–192)
COLOR UR AUTO: ABNORMAL
COLOR UR AUTO: YELLOW
COLOR UR AUTO: YELLOW
CREAT SERPL-MCNC: 0.77 MG/DL (ref 0.51–0.95)
CREAT SERPL-MCNC: 0.8 MG/DL (ref 0.51–0.95)
CREAT SERPL-MCNC: 0.85 MG/DL (ref 0.51–0.95)
CREAT SERPL-MCNC: 0.86 MG/DL (ref 0.51–0.95)
CREAT SERPL-MCNC: 0.93 MG/DL (ref 0.51–0.95)
CREAT SERPL-MCNC: 0.97 MG/DL (ref 0.51–0.95)
CREAT SERPL-MCNC: 1.1 MG/DL (ref 0.51–0.95)
CREAT SERPL-MCNC: 1.19 MG/DL (ref 0.51–0.95)
CREAT SERPL-MCNC: 1.24 MG/DL (ref 0.51–0.95)
CREAT SERPL-MCNC: 1.26 MG/DL (ref 0.51–0.95)
CRYPTOSP DNA STL QL NAA+NON-PROBE: NEGATIVE
CTX-M: NOT DETECTED
DEPRECATED HCO3 PLAS-SCNC: 21 MMOL/L (ref 22–29)
DEPRECATED HCO3 PLAS-SCNC: 23 MMOL/L (ref 22–29)
DEPRECATED HCO3 PLAS-SCNC: 27 MMOL/L (ref 22–29)
DIASTOLIC BLOOD PRESSURE - MUSE: NORMAL MMHG
DIASTOLIC BLOOD PRESSURE - MUSE: NORMAL MMHG
E COLI O157 DNA STL QL NAA+NON-PROBE: NORMAL
E HISTOLYT DNA STL QL NAA+NON-PROBE: NEGATIVE
EAEC ASTA GENE ISLT QL NAA+PROBE: NEGATIVE
EC STX1+STX2 GENES STL QL NAA+NON-PROBE: NEGATIVE
EGFRCR SERPLBLD CKD-EPI 2021: 44 ML/MIN/1.73M2
EGFRCR SERPLBLD CKD-EPI 2021: 45 ML/MIN/1.73M2
EGFRCR SERPLBLD CKD-EPI 2021: 48 ML/MIN/1.73M2
EGFRCR SERPLBLD CKD-EPI 2021: 52 ML/MIN/1.73M2
EGFRCR SERPLBLD CKD-EPI 2021: 61 ML/MIN/1.73M2
EGFRCR SERPLBLD CKD-EPI 2021: 64 ML/MIN/1.73M2
EGFRCR SERPLBLD CKD-EPI 2021: 70 ML/MIN/1.73M2
EGFRCR SERPLBLD CKD-EPI 2021: 71 ML/MIN/1.73M2
EGFRCR SERPLBLD CKD-EPI 2021: 76 ML/MIN/1.73M2
EGFRCR SERPLBLD CKD-EPI 2021: 80 ML/MIN/1.73M2
ENTEROBACTER SPECIES: NOT DETECTED
EOSINOPHIL # BLD AUTO: 0 10E3/UL (ref 0–0.7)
EOSINOPHIL # BLD AUTO: 0.2 10E3/UL (ref 0–0.7)
EOSINOPHIL NFR BLD AUTO: 0 %
EOSINOPHIL NFR BLD AUTO: 4 %
EPEC EAE GENE STL QL NAA+NON-PROBE: NEGATIVE
ERYTHROCYTE [DISTWIDTH] IN BLOOD BY AUTOMATED COUNT: 12 % (ref 10–15)
ERYTHROCYTE [DISTWIDTH] IN BLOOD BY AUTOMATED COUNT: 13 % (ref 10–15)
ERYTHROCYTE [DISTWIDTH] IN BLOOD BY AUTOMATED COUNT: 13 % (ref 10–15)
ERYTHROCYTE [DISTWIDTH] IN BLOOD BY AUTOMATED COUNT: 13.1 % (ref 10–15)
ERYTHROCYTE [DISTWIDTH] IN BLOOD BY AUTOMATED COUNT: 13.1 % (ref 10–15)
ERYTHROCYTE [DISTWIDTH] IN BLOOD BY AUTOMATED COUNT: 13.3 % (ref 10–15)
ESCHERICHIA COLI: DETECTED
ETEC LTA+ST1A+ST1B TOX ST NAA+NON-PROBE: NEGATIVE
FLUAV RNA SPEC QL NAA+PROBE: NEGATIVE
FLUAV RNA SPEC QL NAA+PROBE: NEGATIVE
FLUBV RNA RESP QL NAA+PROBE: NEGATIVE
FLUBV RNA RESP QL NAA+PROBE: NEGATIVE
G LAMBLIA DNA STL QL NAA+NON-PROBE: NEGATIVE
GAMMA INTERFERON BACKGROUND BLD IA-ACNC: 0.14 IU/ML
GLUCOSE BLDC GLUCOMTR-MCNC: 87 MG/DL (ref 70–99)
GLUCOSE SERPL-MCNC: 108 MG/DL (ref 70–99)
GLUCOSE SERPL-MCNC: 119 MG/DL (ref 70–99)
GLUCOSE SERPL-MCNC: 123 MG/DL (ref 70–99)
GLUCOSE SERPL-MCNC: 78 MG/DL (ref 70–99)
GLUCOSE SERPL-MCNC: 79 MG/DL (ref 70–99)
GLUCOSE SERPL-MCNC: 81 MG/DL (ref 70–99)
GLUCOSE SERPL-MCNC: 93 MG/DL (ref 70–99)
GLUCOSE UR STRIP-MCNC: 50 MG/DL
GLUCOSE UR STRIP-MCNC: NEGATIVE MG/DL
GLUCOSE UR STRIP-MCNC: NEGATIVE MG/DL
HCO3 BLDV-SCNC: 29 MMOL/L (ref 21–28)
HCO3 SERPL-SCNC: 22 MMOL/L (ref 22–29)
HCO3 SERPL-SCNC: 22 MMOL/L (ref 22–29)
HCO3 SERPL-SCNC: 23 MMOL/L (ref 22–29)
HCO3 SERPL-SCNC: 27 MMOL/L (ref 22–29)
HCT VFR BLD AUTO: 27.8 % (ref 35–47)
HCT VFR BLD AUTO: 28.7 % (ref 35–47)
HCT VFR BLD AUTO: 30.5 % (ref 35–47)
HCT VFR BLD AUTO: 33.1 % (ref 35–47)
HCT VFR BLD AUTO: 33.5 % (ref 35–47)
HCT VFR BLD AUTO: 35.5 % (ref 35–47)
HGB BLD-MCNC: 10.6 G/DL (ref 11.7–15.7)
HGB BLD-MCNC: 11 G/DL (ref 11.7–15.7)
HGB BLD-MCNC: 11.8 G/DL (ref 11.7–15.7)
HGB BLD-MCNC: 9.1 G/DL (ref 11.7–15.7)
HGB BLD-MCNC: 9.3 G/DL (ref 11.7–15.7)
HGB BLD-MCNC: 9.9 G/DL (ref 11.7–15.7)
HGB UR QL STRIP: ABNORMAL
HGB UR QL STRIP: NEGATIVE
HGB UR QL STRIP: NEGATIVE
HOLD SPECIMEN: NORMAL
HYALINE CASTS: 5 /LPF
IMM GRANULOCYTES # BLD: 0 10E3/UL
IMM GRANULOCYTES NFR BLD: 0 %
IMM GRANULOCYTES NFR BLD: 1 %
IMP: NOT DETECTED
INR PPP: 0.98 (ref 0.85–1.15)
INTERPRETATION ECG - MUSE: NORMAL
INTERPRETATION ECG - MUSE: NORMAL
KETONES UR STRIP-MCNC: 20 MG/DL
KETONES UR STRIP-MCNC: NEGATIVE MG/DL
KETONES UR STRIP-MCNC: NEGATIVE MG/DL
KLEBSIELLA OXYTOCA: NOT DETECTED
KLEBSIELLA PNEUMONIAE: NOT DETECTED
KPC: NOT DETECTED
LACTATE BLD-SCNC: 0.6 MMOL/L
LACTATE SERPL-SCNC: 0.8 MMOL/L (ref 0.7–2)
LEUKOCYTE ESTERASE UR QL STRIP: ABNORMAL
LEUKOCYTE ESTERASE UR QL STRIP: ABNORMAL
LEUKOCYTE ESTERASE UR QL STRIP: NEGATIVE
LYMPHOCYTES # BLD AUTO: 0.4 10E3/UL (ref 0.8–5.3)
LYMPHOCYTES # BLD AUTO: 0.6 10E3/UL (ref 0.8–5.3)
LYMPHOCYTES # BLD AUTO: 0.7 10E3/UL (ref 0.8–5.3)
LYMPHOCYTES # BLD AUTO: 0.9 10E3/UL (ref 0.8–5.3)
LYMPHOCYTES # BLD AUTO: 1.2 10E3/UL (ref 0.8–5.3)
LYMPHOCYTES NFR BLD AUTO: 10 %
LYMPHOCYTES NFR BLD AUTO: 13 %
LYMPHOCYTES NFR BLD AUTO: 28 %
LYMPHOCYTES NFR BLD AUTO: 5 %
LYMPHOCYTES NFR BLD AUTO: 7 %
M TB IFN-G BLD-IMP: NEGATIVE
M TB IFN-G CD4+ BCKGRND COR BLD-ACNC: 4.32 IU/ML
MAGNESIUM SERPL-MCNC: 2 MG/DL (ref 1.7–2.3)
MCH RBC QN AUTO: 30.6 PG (ref 26.5–33)
MCH RBC QN AUTO: 30.6 PG (ref 26.5–33)
MCH RBC QN AUTO: 30.7 PG (ref 26.5–33)
MCH RBC QN AUTO: 30.8 PG (ref 26.5–33)
MCH RBC QN AUTO: 31.3 PG (ref 26.5–33)
MCH RBC QN AUTO: 31.9 PG (ref 26.5–33)
MCHC RBC AUTO-ENTMCNC: 32 G/DL (ref 31.5–36.5)
MCHC RBC AUTO-ENTMCNC: 32.4 G/DL (ref 31.5–36.5)
MCHC RBC AUTO-ENTMCNC: 32.5 G/DL (ref 31.5–36.5)
MCHC RBC AUTO-ENTMCNC: 32.7 G/DL (ref 31.5–36.5)
MCHC RBC AUTO-ENTMCNC: 32.8 G/DL (ref 31.5–36.5)
MCHC RBC AUTO-ENTMCNC: 33.2 G/DL (ref 31.5–36.5)
MCV RBC AUTO: 94 FL (ref 78–100)
MCV RBC AUTO: 95 FL (ref 78–100)
MCV RBC AUTO: 96 FL (ref 78–100)
MCV RBC AUTO: 96 FL (ref 78–100)
MITOGEN IGNF BCKGRD COR BLD-ACNC: 0 IU/ML
MITOGEN IGNF BCKGRD COR BLD-ACNC: 0 IU/ML
MONOCYTES # BLD AUTO: 0.1 10E3/UL (ref 0–1.3)
MONOCYTES # BLD AUTO: 0.3 10E3/UL (ref 0–1.3)
MONOCYTES # BLD AUTO: 0.3 10E3/UL (ref 0–1.3)
MONOCYTES # BLD AUTO: 0.5 10E3/UL (ref 0–1.3)
MONOCYTES # BLD AUTO: 0.5 10E3/UL (ref 0–1.3)
MONOCYTES NFR BLD AUTO: 1 %
MONOCYTES NFR BLD AUTO: 5 %
MONOCYTES NFR BLD AUTO: 6 %
MONOCYTES NFR BLD AUTO: 6 %
MONOCYTES NFR BLD AUTO: 8 %
MUCOUS THREADS #/AREA URNS LPF: PRESENT /LPF
MUCOUS THREADS #/AREA URNS LPF: PRESENT /LPF
NDM: NOT DETECTED
NEUTROPHILS # BLD AUTO: 2.5 10E3/UL (ref 1.6–8.3)
NEUTROPHILS # BLD AUTO: 4.3 10E3/UL (ref 1.6–8.3)
NEUTROPHILS # BLD AUTO: 7.2 10E3/UL (ref 1.6–8.3)
NEUTROPHILS # BLD AUTO: 7.4 10E3/UL (ref 1.6–8.3)
NEUTROPHILS # BLD AUTO: 8.1 10E3/UL (ref 1.6–8.3)
NEUTROPHILS NFR BLD AUTO: 60 %
NEUTROPHILS NFR BLD AUTO: 79 %
NEUTROPHILS NFR BLD AUTO: 84 %
NEUTROPHILS NFR BLD AUTO: 87 %
NEUTROPHILS NFR BLD AUTO: 94 %
NITRATE UR QL: NEGATIVE
NOROVIRUS GI+II RNA STL QL NAA+NON-PROBE: NEGATIVE
NRBC # BLD AUTO: 0 10E3/UL
NRBC BLD AUTO-RTO: 0 /100
OXA (DETECTED/NOT DETECTED): NOT DETECTED
P AXIS - MUSE: 30 DEGREES
P AXIS - MUSE: 38 DEGREES
P SHIGELLOIDES DNA STL QL NAA+NON-PROBE: NEGATIVE
PCO2 BLDV: 42 MM HG (ref 40–50)
PH BLDV: 7.45 [PH] (ref 7.32–7.43)
PH UR STRIP: 6 [PH] (ref 5–7)
PH UR STRIP: 6 [PH] (ref 5–7)
PH UR STRIP: 6.5 [PH] (ref 5–7)
PLATELET # BLD AUTO: 174 10E3/UL (ref 150–450)
PLATELET # BLD AUTO: 182 10E3/UL (ref 150–450)
PLATELET # BLD AUTO: 191 10E3/UL (ref 150–450)
PLATELET # BLD AUTO: 193 10E3/UL (ref 150–450)
PLATELET # BLD AUTO: 193 10E3/UL (ref 150–450)
PLATELET # BLD AUTO: 196 10E3/UL (ref 150–450)
PLATELET # BLD AUTO: 232 10E3/UL (ref 150–450)
PLATELET # BLD AUTO: 341 10E3/UL (ref 150–450)
PO2 BLDV: 24 MM HG (ref 25–47)
POTASSIUM SERPL-SCNC: 3.8 MMOL/L (ref 3.4–5.3)
POTASSIUM SERPL-SCNC: 3.8 MMOL/L (ref 3.4–5.3)
POTASSIUM SERPL-SCNC: 4.1 MMOL/L (ref 3.4–5.3)
POTASSIUM SERPL-SCNC: 4.2 MMOL/L (ref 3.4–5.3)
POTASSIUM SERPL-SCNC: 4.2 MMOL/L (ref 3.4–5.3)
POTASSIUM SERPL-SCNC: 4.3 MMOL/L (ref 3.4–5.3)
POTASSIUM SERPL-SCNC: 4.4 MMOL/L (ref 3.4–5.3)
PR INTERVAL - MUSE: 142 MS
PR INTERVAL - MUSE: 152 MS
PROCALCITONIN SERPL IA-MCNC: 0.39 NG/ML
PROT SERPL-MCNC: 8 G/DL (ref 6.4–8.3)
PROT SERPL-MCNC: 8 G/DL (ref 6.4–8.3)
PROTEUS SPECIES: NOT DETECTED
PSEUDOMONAS AERUGINOSA: NOT DETECTED
QRS DURATION - MUSE: 66 MS
QRS DURATION - MUSE: 68 MS
QT - MUSE: 366 MS
QT - MUSE: 436 MS
QTC - MUSE: 453 MS
QTC - MUSE: 457 MS
QUANTIFERON MITOGEN: 4.46 IU/ML
QUANTIFERON NIL TUBE: 0.14 IU/ML
QUANTIFERON TB1 TUBE: 0.14 IU/ML
QUANTIFERON TB2 TUBE: 0.14
R AXIS - MUSE: -3 DEGREES
R AXIS - MUSE: 28 DEGREES
RBC # BLD AUTO: 2.97 10E6/UL (ref 3.8–5.2)
RBC # BLD AUTO: 3.04 10E6/UL (ref 3.8–5.2)
RBC # BLD AUTO: 3.23 10E6/UL (ref 3.8–5.2)
RBC # BLD AUTO: 3.44 10E6/UL (ref 3.8–5.2)
RBC # BLD AUTO: 3.51 10E6/UL (ref 3.8–5.2)
RBC # BLD AUTO: 3.7 10E6/UL (ref 3.8–5.2)
RBC URINE: 1 /HPF
RBC URINE: 16 /HPF
RBC URINE: 6 /HPF
RSV RNA SPEC NAA+PROBE: NEGATIVE
RSV RNA SPEC NAA+PROBE: NEGATIVE
RVA RNA STL QL NAA+NON-PROBE: NEGATIVE
SALMONELLA SP RPOD STL QL NAA+PROBE: NEGATIVE
SAO2 % BLDV: 45 % (ref 70–75)
SAPO I+II+IV+V RNA STL QL NAA+NON-PROBE: NEGATIVE
SARS-COV-2 RNA RESP QL NAA+PROBE: NEGATIVE
SARS-COV-2 RNA RESP QL NAA+PROBE: NEGATIVE
SHIGELLA SP+EIEC IPAH ST NAA+NON-PROBE: NEGATIVE
SODIUM SERPL-SCNC: 137 MMOL/L (ref 135–145)
SODIUM SERPL-SCNC: 138 MMOL/L (ref 135–145)
SODIUM SERPL-SCNC: 138 MMOL/L (ref 135–145)
SODIUM SERPL-SCNC: 140 MMOL/L (ref 135–145)
SODIUM SERPL-SCNC: 140 MMOL/L (ref 135–145)
SODIUM SERPL-SCNC: 142 MMOL/L (ref 135–145)
SODIUM SERPL-SCNC: 143 MMOL/L (ref 135–145)
SP GR UR STRIP: 1.01 (ref 1–1.03)
SP GR UR STRIP: 1.01 (ref 1–1.03)
SP GR UR STRIP: 1.03 (ref 1–1.03)
SQUAMOUS EPITHELIAL: 1 /HPF
SQUAMOUS EPITHELIAL: 3 /HPF
SYSTOLIC BLOOD PRESSURE - MUSE: NORMAL MMHG
SYSTOLIC BLOOD PRESSURE - MUSE: NORMAL MMHG
T AXIS - MUSE: 41 DEGREES
T AXIS - MUSE: 69 DEGREES
TROPONIN T SERPL HS-MCNC: 20 NG/L
TROPONIN T SERPL HS-MCNC: 22 NG/L
TSH SERPL DL<=0.005 MIU/L-ACNC: 3.49 UIU/ML (ref 0.3–4.2)
UROBILINOGEN UR STRIP-MCNC: NORMAL MG/DL
V CHOLERAE DNA SPEC QL NAA+PROBE: NEGATIVE
VENTRICULAR RATE- MUSE: 65 BPM
VENTRICULAR RATE- MUSE: 94 BPM
VIBRIO DNA SPEC NAA+PROBE: NEGATIVE
VIM: NOT DETECTED
WBC # BLD AUTO: 4.2 10E3/UL (ref 4–11)
WBC # BLD AUTO: 4.8 10E3/UL (ref 4–11)
WBC # BLD AUTO: 5.4 10E3/UL (ref 4–11)
WBC # BLD AUTO: 8.3 10E3/UL (ref 4–11)
WBC # BLD AUTO: 8.7 10E3/UL (ref 4–11)
WBC # BLD AUTO: 8.8 10E3/UL (ref 4–11)
WBC CLUMPS #/AREA URNS HPF: PRESENT /HPF
WBC URINE: 2 /HPF
WBC URINE: 54 /HPF
WBC URINE: >182 /HPF
Y ENTEROCOL DNA STL QL NAA+PROBE: NEGATIVE

## 2024-01-01 PROCEDURE — 250N000011 HC RX IP 250 OP 636: Performed by: INTERNAL MEDICINE

## 2024-01-01 PROCEDURE — 250N000013 HC RX MED GY IP 250 OP 250 PS 637: Performed by: STUDENT IN AN ORGANIZED HEALTH CARE EDUCATION/TRAINING PROGRAM

## 2024-01-01 PROCEDURE — 82565 ASSAY OF CREATININE: CPT | Performed by: INTERNAL MEDICINE

## 2024-01-01 PROCEDURE — 120N000001 HC R&B MED SURG/OB

## 2024-01-01 PROCEDURE — 99284 EMERGENCY DEPT VISIT MOD MDM: CPT | Mod: 25 | Performed by: EMERGENCY MEDICINE

## 2024-01-01 PROCEDURE — 81001 URINALYSIS AUTO W/SCOPE: CPT | Performed by: EMERGENCY MEDICINE

## 2024-01-01 PROCEDURE — 97165 OT EVAL LOW COMPLEX 30 MIN: CPT | Mod: GO | Performed by: OCCUPATIONAL THERAPIST

## 2024-01-01 PROCEDURE — 97535 SELF CARE MNGMENT TRAINING: CPT | Mod: GO | Performed by: OCCUPATIONAL THERAPIST

## 2024-01-01 PROCEDURE — 97161 PT EVAL LOW COMPLEX 20 MIN: CPT | Mod: GP

## 2024-01-01 PROCEDURE — 74177 CT ABD & PELVIS W/CONTRAST: CPT

## 2024-01-01 PROCEDURE — G0378 HOSPITAL OBSERVATION PER HR: HCPCS

## 2024-01-01 PROCEDURE — 85014 HEMATOCRIT: CPT

## 2024-01-01 PROCEDURE — 80048 BASIC METABOLIC PNL TOTAL CA: CPT | Performed by: EMERGENCY MEDICINE

## 2024-01-01 PROCEDURE — 36415 COLL VENOUS BLD VENIPUNCTURE: CPT

## 2024-01-01 PROCEDURE — 87086 URINE CULTURE/COLONY COUNT: CPT | Performed by: EMERGENCY MEDICINE

## 2024-01-01 PROCEDURE — 250N000013 HC RX MED GY IP 250 OP 250 PS 637: Performed by: INTERNAL MEDICINE

## 2024-01-01 PROCEDURE — 84295 ASSAY OF SERUM SODIUM: CPT

## 2024-01-01 PROCEDURE — 80048 BASIC METABOLIC PNL TOTAL CA: CPT

## 2024-01-01 PROCEDURE — 97530 THERAPEUTIC ACTIVITIES: CPT | Mod: GP

## 2024-01-01 PROCEDURE — 87637 SARSCOV2&INF A&B&RSV AMP PRB: CPT | Performed by: EMERGENCY MEDICINE

## 2024-01-01 PROCEDURE — 258N000003 HC RX IP 258 OP 636: Performed by: INTERNAL MEDICINE

## 2024-01-01 PROCEDURE — 85025 COMPLETE CBC W/AUTO DIFF WBC: CPT | Performed by: HOSPITALIST

## 2024-01-01 PROCEDURE — 36415 COLL VENOUS BLD VENIPUNCTURE: CPT | Performed by: INTERNAL MEDICINE

## 2024-01-01 PROCEDURE — 99310 SBSQ NF CARE HIGH MDM 45: CPT

## 2024-01-01 PROCEDURE — 250N000011 HC RX IP 250 OP 636: Performed by: EMERGENCY MEDICINE

## 2024-01-01 PROCEDURE — 36415 COLL VENOUS BLD VENIPUNCTURE: CPT | Performed by: EMERGENCY MEDICINE

## 2024-01-01 PROCEDURE — 99285 EMERGENCY DEPT VISIT HI MDM: CPT | Mod: 25

## 2024-01-01 PROCEDURE — 258N000003 HC RX IP 258 OP 636: Performed by: HOSPITALIST

## 2024-01-01 PROCEDURE — 83735 ASSAY OF MAGNESIUM: CPT | Performed by: EMERGENCY MEDICINE

## 2024-01-01 PROCEDURE — 82550 ASSAY OF CK (CPK): CPT | Performed by: INTERNAL MEDICINE

## 2024-01-01 PROCEDURE — 250N000013 HC RX MED GY IP 250 OP 250 PS 637: Performed by: EMERGENCY MEDICINE

## 2024-01-01 PROCEDURE — 70450 CT HEAD/BRAIN W/O DYE: CPT

## 2024-01-01 PROCEDURE — 80048 BASIC METABOLIC PNL TOTAL CA: CPT | Performed by: INTERNAL MEDICINE

## 2024-01-01 PROCEDURE — 84443 ASSAY THYROID STIM HORMONE: CPT | Performed by: EMERGENCY MEDICINE

## 2024-01-01 PROCEDURE — 99233 SBSQ HOSP IP/OBS HIGH 50: CPT | Performed by: STUDENT IN AN ORGANIZED HEALTH CARE EDUCATION/TRAINING PROGRAM

## 2024-01-01 PROCEDURE — 84484 ASSAY OF TROPONIN QUANT: CPT | Performed by: EMERGENCY MEDICINE

## 2024-01-01 PROCEDURE — 258N000003 HC RX IP 258 OP 636: Performed by: STUDENT IN AN ORGANIZED HEALTH CARE EDUCATION/TRAINING PROGRAM

## 2024-01-01 PROCEDURE — 82565 ASSAY OF CREATININE: CPT

## 2024-01-01 PROCEDURE — 99222 1ST HOSP IP/OBS MODERATE 55: CPT | Mod: 25 | Performed by: NURSE PRACTITIONER

## 2024-01-01 PROCEDURE — 82248 BILIRUBIN DIRECT: CPT | Performed by: EMERGENCY MEDICINE

## 2024-01-01 PROCEDURE — 250N000009 HC RX 250: Performed by: EMERGENCY MEDICINE

## 2024-01-01 PROCEDURE — 99497 ADVNCD CARE PLAN 30 MIN: CPT | Mod: 25 | Performed by: NURSE PRACTITIONER

## 2024-01-01 PROCEDURE — 36415 COLL VENOUS BLD VENIPUNCTURE: CPT | Performed by: PHYSICIAN ASSISTANT

## 2024-01-01 PROCEDURE — 96361 HYDRATE IV INFUSION ADD-ON: CPT

## 2024-01-01 PROCEDURE — 99223 1ST HOSP IP/OBS HIGH 75: CPT | Performed by: INTERNAL MEDICINE

## 2024-01-01 PROCEDURE — 83605 ASSAY OF LACTIC ACID: CPT | Performed by: EMERGENCY MEDICINE

## 2024-01-01 PROCEDURE — 80053 COMPREHEN METABOLIC PANEL: CPT | Performed by: EMERGENCY MEDICINE

## 2024-01-01 PROCEDURE — 99239 HOSP IP/OBS DSCHRG MGMT >30: CPT | Performed by: INTERNAL MEDICINE

## 2024-01-01 PROCEDURE — 93005 ELECTROCARDIOGRAM TRACING: CPT

## 2024-01-01 PROCEDURE — 87040 BLOOD CULTURE FOR BACTERIA: CPT | Performed by: HOSPITALIST

## 2024-01-01 PROCEDURE — 71046 X-RAY EXAM CHEST 2 VIEWS: CPT

## 2024-01-01 PROCEDURE — 87507 IADNA-DNA/RNA PROBE TQ 12-25: CPT | Performed by: STUDENT IN AN ORGANIZED HEALTH CARE EDUCATION/TRAINING PROGRAM

## 2024-01-01 PROCEDURE — 97116 GAIT TRAINING THERAPY: CPT | Mod: GP

## 2024-01-01 PROCEDURE — 84145 PROCALCITONIN (PCT): CPT | Performed by: EMERGENCY MEDICINE

## 2024-01-01 PROCEDURE — 82140 ASSAY OF AMMONIA: CPT | Performed by: EMERGENCY MEDICINE

## 2024-01-01 PROCEDURE — 82803 BLOOD GASES ANY COMBINATION: CPT

## 2024-01-01 PROCEDURE — 85610 PROTHROMBIN TIME: CPT | Performed by: EMERGENCY MEDICINE

## 2024-01-01 PROCEDURE — 99239 HOSP IP/OBS DSCHRG MGMT >30: CPT | Performed by: HOSPITALIST

## 2024-01-01 PROCEDURE — 99232 SBSQ HOSP IP/OBS MODERATE 35: CPT | Performed by: STUDENT IN AN ORGANIZED HEALTH CARE EDUCATION/TRAINING PROGRAM

## 2024-01-01 PROCEDURE — 99232 SBSQ HOSP IP/OBS MODERATE 35: CPT | Performed by: HOSPITALIST

## 2024-01-01 PROCEDURE — 85025 COMPLETE CBC W/AUTO DIFF WBC: CPT | Performed by: EMERGENCY MEDICINE

## 2024-01-01 PROCEDURE — 82565 ASSAY OF CREATININE: CPT | Performed by: PHYSICIAN ASSISTANT

## 2024-01-01 PROCEDURE — 87186 SC STD MICRODIL/AGAR DIL: CPT | Performed by: EMERGENCY MEDICINE

## 2024-01-01 PROCEDURE — 72148 MRI LUMBAR SPINE W/O DYE: CPT

## 2024-01-01 PROCEDURE — 82550 ASSAY OF CK (CPK): CPT | Performed by: EMERGENCY MEDICINE

## 2024-01-01 PROCEDURE — 86481 TB AG RESPONSE T-CELL SUSP: CPT

## 2024-01-01 PROCEDURE — 99221 1ST HOSP IP/OBS SF/LOW 40: CPT | Performed by: PHYSICIAN ASSISTANT

## 2024-01-01 PROCEDURE — 96360 HYDRATION IV INFUSION INIT: CPT | Mod: 59

## 2024-01-01 PROCEDURE — 36415 COLL VENOUS BLD VENIPUNCTURE: CPT | Performed by: HOSPITALIST

## 2024-01-01 PROCEDURE — 96372 THER/PROPH/DIAG INJ SC/IM: CPT | Performed by: INTERNAL MEDICINE

## 2024-01-01 PROCEDURE — 72070 X-RAY EXAM THORAC SPINE 2VWS: CPT

## 2024-01-01 PROCEDURE — 87149 DNA/RNA DIRECT PROBE: CPT | Performed by: EMERGENCY MEDICINE

## 2024-01-01 PROCEDURE — 97530 THERAPEUTIC ACTIVITIES: CPT | Mod: GO | Performed by: OCCUPATIONAL THERAPIST

## 2024-01-01 PROCEDURE — 85049 AUTOMATED PLATELET COUNT: CPT | Performed by: INTERNAL MEDICINE

## 2024-01-01 PROCEDURE — 99232 SBSQ HOSP IP/OBS MODERATE 35: CPT | Performed by: INTERNAL MEDICINE

## 2024-01-01 PROCEDURE — 99222 1ST HOSP IP/OBS MODERATE 55: CPT | Performed by: INTERNAL MEDICINE

## 2024-01-01 PROCEDURE — 258N000003 HC RX IP 258 OP 636: Performed by: EMERGENCY MEDICINE

## 2024-01-01 PROCEDURE — 84520 ASSAY OF UREA NITROGEN: CPT

## 2024-01-01 PROCEDURE — 85041 AUTOMATED RBC COUNT: CPT | Performed by: PHYSICIAN ASSISTANT

## 2024-01-01 PROCEDURE — P9604 ONE-WAY ALLOW PRORATED TRIP: HCPCS

## 2024-01-01 PROCEDURE — 85048 AUTOMATED LEUKOCYTE COUNT: CPT

## 2024-01-01 RX ORDER — OLANZAPINE 2.5 MG/1
2.5 TABLET, FILM COATED ORAL 2 TIMES DAILY PRN
Status: DISCONTINUED | OUTPATIENT
Start: 2024-01-01 | End: 2024-01-01 | Stop reason: HOSPADM

## 2024-01-01 RX ORDER — SODIUM CHLORIDE 9 MG/ML
INJECTION, SOLUTION INTRAVENOUS CONTINUOUS
Status: DISCONTINUED | OUTPATIENT
Start: 2024-01-01 | End: 2024-01-01

## 2024-01-01 RX ORDER — NALOXONE HYDROCHLORIDE 0.4 MG/ML
0.4 INJECTION, SOLUTION INTRAMUSCULAR; INTRAVENOUS; SUBCUTANEOUS
Status: DISCONTINUED | OUTPATIENT
Start: 2024-01-01 | End: 2024-01-01 | Stop reason: HOSPADM

## 2024-01-01 RX ORDER — OLANZAPINE 2.5 MG/1
2.5 TABLET, FILM COATED ORAL 2 TIMES DAILY
Status: DISCONTINUED | OUTPATIENT
Start: 2024-01-01 | End: 2024-01-01 | Stop reason: HOSPADM

## 2024-01-01 RX ORDER — DONEPEZIL HYDROCHLORIDE 5 MG/1
5 TABLET, FILM COATED ORAL AT BEDTIME
Status: DISCONTINUED | OUTPATIENT
Start: 2024-01-01 | End: 2024-01-01 | Stop reason: HOSPADM

## 2024-01-01 RX ORDER — ACETAMINOPHEN 325 MG/1
650 TABLET ORAL EVERY 8 HOURS PRN
DISCHARGE
Start: 2024-01-01

## 2024-01-01 RX ORDER — LIDOCAINE 40 MG/G
CREAM TOPICAL
Status: DISCONTINUED | OUTPATIENT
Start: 2024-01-01 | End: 2024-01-01 | Stop reason: HOSPADM

## 2024-01-01 RX ORDER — CEFDINIR 300 MG/1
300 CAPSULE ORAL EVERY 12 HOURS
Qty: 10 CAPSULE | Refills: 0 | Status: SHIPPED | OUTPATIENT
Start: 2024-01-01 | End: 2024-01-01

## 2024-01-01 RX ORDER — AMOXICILLIN 250 MG
1 CAPSULE ORAL 2 TIMES DAILY PRN
DISCHARGE
Start: 2024-01-01

## 2024-01-01 RX ORDER — LEVOTHYROXINE SODIUM 112 UG/1
112 TABLET ORAL DAILY
Status: DISCONTINUED | OUTPATIENT
Start: 2024-01-01 | End: 2024-01-01 | Stop reason: HOSPADM

## 2024-01-01 RX ORDER — DONEPEZIL HYDROCHLORIDE 5 MG/1
5 TABLET, FILM COATED ORAL AT BEDTIME
COMMUNITY
Start: 2024-01-01

## 2024-01-01 RX ORDER — ACETAMINOPHEN 500 MG
1000 TABLET ORAL ONCE
Status: DISCONTINUED | OUTPATIENT
Start: 2024-01-01 | End: 2024-01-01

## 2024-01-01 RX ORDER — CEPHALEXIN 500 MG/1
500 CAPSULE ORAL 3 TIMES DAILY
Qty: 15 CAPSULE | Refills: 0 | Status: SHIPPED | OUTPATIENT
Start: 2024-01-01 | End: 2024-01-01

## 2024-01-01 RX ORDER — CALCIUM CARBONATE 500 MG/1
1000 TABLET, CHEWABLE ORAL 4 TIMES DAILY PRN
Status: DISCONTINUED | OUTPATIENT
Start: 2024-01-01 | End: 2024-01-01 | Stop reason: HOSPADM

## 2024-01-01 RX ORDER — VENLAFAXINE HYDROCHLORIDE 37.5 MG/1
37.5 CAPSULE, EXTENDED RELEASE ORAL DAILY
Status: DISCONTINUED | OUTPATIENT
Start: 2024-01-01 | End: 2024-01-01 | Stop reason: HOSPADM

## 2024-01-01 RX ORDER — ONDANSETRON 4 MG/1
4 TABLET, ORALLY DISINTEGRATING ORAL EVERY 6 HOURS PRN
Status: DISCONTINUED | OUTPATIENT
Start: 2024-01-01 | End: 2024-01-01 | Stop reason: HOSPADM

## 2024-01-01 RX ORDER — OLANZAPINE 2.5 MG/1
2.5 TABLET, FILM COATED ORAL 2 TIMES DAILY PRN
DISCHARGE
Start: 2024-01-01

## 2024-01-01 RX ORDER — AMOXICILLIN 250 MG
1 CAPSULE ORAL 2 TIMES DAILY PRN
Status: DISCONTINUED | OUTPATIENT
Start: 2024-01-01 | End: 2024-01-01 | Stop reason: HOSPADM

## 2024-01-01 RX ORDER — NALOXONE HYDROCHLORIDE 0.4 MG/ML
0.2 INJECTION, SOLUTION INTRAMUSCULAR; INTRAVENOUS; SUBCUTANEOUS
Status: DISCONTINUED | OUTPATIENT
Start: 2024-01-01 | End: 2024-01-01 | Stop reason: HOSPADM

## 2024-01-01 RX ORDER — IOPAMIDOL 755 MG/ML
65 INJECTION, SOLUTION INTRAVASCULAR ONCE
Status: COMPLETED | OUTPATIENT
Start: 2024-01-01 | End: 2024-01-01

## 2024-01-01 RX ORDER — ONDANSETRON 2 MG/ML
4 INJECTION INTRAMUSCULAR; INTRAVENOUS EVERY 6 HOURS PRN
Status: DISCONTINUED | OUTPATIENT
Start: 2024-01-01 | End: 2024-01-01 | Stop reason: HOSPADM

## 2024-01-01 RX ORDER — PROCHLORPERAZINE MALEATE 5 MG
5 TABLET ORAL EVERY 6 HOURS PRN
Status: DISCONTINUED | OUTPATIENT
Start: 2024-01-01 | End: 2024-01-01 | Stop reason: HOSPADM

## 2024-01-01 RX ORDER — ACETAMINOPHEN 325 MG/1
650 TABLET ORAL EVERY 4 HOURS PRN
Status: DISCONTINUED | OUTPATIENT
Start: 2024-01-01 | End: 2024-01-01 | Stop reason: HOSPADM

## 2024-01-01 RX ORDER — AMOXICILLIN 250 MG
2 CAPSULE ORAL 2 TIMES DAILY PRN
Status: DISCONTINUED | OUTPATIENT
Start: 2024-01-01 | End: 2024-01-01 | Stop reason: HOSPADM

## 2024-01-01 RX ORDER — ACETAMINOPHEN 650 MG/1
650 SUPPOSITORY RECTAL ONCE
Status: COMPLETED | OUTPATIENT
Start: 2024-01-01 | End: 2024-01-01

## 2024-01-01 RX ORDER — ENOXAPARIN SODIUM 100 MG/ML
40 INJECTION SUBCUTANEOUS EVERY 24 HOURS
Status: DISCONTINUED | OUTPATIENT
Start: 2024-01-01 | End: 2024-01-01 | Stop reason: HOSPADM

## 2024-01-01 RX ORDER — VENLAFAXINE HYDROCHLORIDE 37.5 MG/1
1 CAPSULE, EXTENDED RELEASE ORAL DAILY
COMMUNITY
Start: 2024-01-01

## 2024-01-01 RX ORDER — POLYETHYLENE GLYCOL 3350 17 G/17G
17 POWDER, FOR SOLUTION ORAL 2 TIMES DAILY PRN
Status: DISCONTINUED | OUTPATIENT
Start: 2024-01-01 | End: 2024-01-01 | Stop reason: HOSPADM

## 2024-01-01 RX ORDER — SODIUM CHLORIDE 9 MG/ML
INJECTION, SOLUTION INTRAVENOUS CONTINUOUS
Status: ACTIVE | OUTPATIENT
Start: 2024-01-01 | End: 2024-01-01

## 2024-01-01 RX ORDER — OLANZAPINE 2.5 MG/1
2.5 TABLET, FILM COATED ORAL 2 TIMES DAILY
Status: ON HOLD | COMMUNITY
End: 2024-01-01

## 2024-01-01 RX ORDER — HYDROMORPHONE HYDROCHLORIDE 2 MG/1
2 TABLET ORAL EVERY 4 HOURS PRN
Status: DISCONTINUED | OUTPATIENT
Start: 2024-01-01 | End: 2024-01-01

## 2024-01-01 RX ORDER — ACETAMINOPHEN 500 MG
500 TABLET ORAL 4 TIMES DAILY
Status: DISCONTINUED | OUTPATIENT
Start: 2024-01-01 | End: 2024-01-01

## 2024-01-01 RX ORDER — PROCHLORPERAZINE 25 MG
12.5 SUPPOSITORY, RECTAL RECTAL EVERY 12 HOURS PRN
Status: DISCONTINUED | OUTPATIENT
Start: 2024-01-01 | End: 2024-01-01 | Stop reason: HOSPADM

## 2024-01-01 RX ORDER — ACETAMINOPHEN 325 MG/1
650 TABLET ORAL EVERY 8 HOURS PRN
Status: DISCONTINUED | OUTPATIENT
Start: 2024-01-01 | End: 2024-01-01 | Stop reason: HOSPADM

## 2024-01-01 RX ORDER — CEPHALEXIN 500 MG/1
500 CAPSULE ORAL ONCE
Status: COMPLETED | OUTPATIENT
Start: 2024-01-01 | End: 2024-01-01

## 2024-01-01 RX ORDER — CEFTRIAXONE 1 G/1
1 INJECTION, POWDER, FOR SOLUTION INTRAMUSCULAR; INTRAVENOUS ONCE
Status: COMPLETED | OUTPATIENT
Start: 2024-01-01 | End: 2024-01-01

## 2024-01-01 RX ORDER — CEFDINIR 300 MG/1
300 CAPSULE ORAL EVERY 12 HOURS SCHEDULED
Status: DISCONTINUED | OUTPATIENT
Start: 2024-01-01 | End: 2024-01-01 | Stop reason: HOSPADM

## 2024-01-01 RX ORDER — ACETAMINOPHEN 650 MG/1
650 SUPPOSITORY RECTAL EVERY 4 HOURS PRN
Status: DISCONTINUED | OUTPATIENT
Start: 2024-01-01 | End: 2024-01-01 | Stop reason: HOSPADM

## 2024-01-01 RX ORDER — CEFTRIAXONE 1 G/1
1 INJECTION, POWDER, FOR SOLUTION INTRAMUSCULAR; INTRAVENOUS EVERY 24 HOURS
Status: DISCONTINUED | OUTPATIENT
Start: 2024-01-01 | End: 2024-01-01

## 2024-01-01 RX ORDER — LORAZEPAM 0.5 MG/1
0.5 TABLET ORAL
Status: COMPLETED | OUTPATIENT
Start: 2024-01-01 | End: 2024-01-01

## 2024-01-01 RX ORDER — OLANZAPINE 10 MG/2ML
10 INJECTION, POWDER, FOR SOLUTION INTRAMUSCULAR DAILY PRN
Status: DISCONTINUED | OUTPATIENT
Start: 2024-01-01 | End: 2024-01-01

## 2024-01-01 RX ADMIN — ACETAMINOPHEN 650 MG: 650 SUPPOSITORY RECTAL at 21:42

## 2024-01-01 RX ADMIN — HYDROMORPHONE HYDROCHLORIDE 1 MG: 2 TABLET ORAL at 20:08

## 2024-01-01 RX ADMIN — DONEPEZIL HYDROCHLORIDE 5 MG: 5 TABLET ORAL at 23:19

## 2024-01-01 RX ADMIN — OLANZAPINE 2.5 MG: 2.5 TABLET, FILM COATED ORAL at 21:10

## 2024-01-01 RX ADMIN — DONEPEZIL HYDROCHLORIDE 5 MG: 5 TABLET ORAL at 20:46

## 2024-01-01 RX ADMIN — LEVOTHYROXINE SODIUM 112 MCG: 0.11 TABLET ORAL at 10:24

## 2024-01-01 RX ADMIN — CEFDINIR 300 MG: 300 CAPSULE ORAL at 10:01

## 2024-01-01 RX ADMIN — ENOXAPARIN SODIUM 40 MG: 40 INJECTION SUBCUTANEOUS at 21:37

## 2024-01-01 RX ADMIN — LEVOTHYROXINE SODIUM 112 MCG: 0.11 TABLET ORAL at 07:55

## 2024-01-01 RX ADMIN — ENOXAPARIN SODIUM 40 MG: 40 INJECTION SUBCUTANEOUS at 21:14

## 2024-01-01 RX ADMIN — SODIUM CHLORIDE: 9 INJECTION, SOLUTION INTRAVENOUS at 12:11

## 2024-01-01 RX ADMIN — IOPAMIDOL 65 ML: 755 INJECTION, SOLUTION INTRAVENOUS at 16:03

## 2024-01-01 RX ADMIN — ACETAMINOPHEN 500 MG: 500 TABLET, FILM COATED ORAL at 21:37

## 2024-01-01 RX ADMIN — OLANZAPINE 2.5 MG: 2.5 TABLET, FILM COATED ORAL at 20:44

## 2024-01-01 RX ADMIN — DONEPEZIL HYDROCHLORIDE 5 MG: 5 TABLET ORAL at 21:14

## 2024-01-01 RX ADMIN — ACETAMINOPHEN 500 MG: 500 TABLET, FILM COATED ORAL at 15:56

## 2024-01-01 RX ADMIN — LEVOTHYROXINE SODIUM 112 MCG: 0.11 TABLET ORAL at 09:48

## 2024-01-01 RX ADMIN — LEVOTHYROXINE SODIUM 112 MCG: 0.11 TABLET ORAL at 08:33

## 2024-01-01 RX ADMIN — VENLAFAXINE HYDROCHLORIDE 37.5 MG: 37.5 CAPSULE, EXTENDED RELEASE ORAL at 07:55

## 2024-01-01 RX ADMIN — SODIUM CHLORIDE: 9 INJECTION, SOLUTION INTRAVENOUS at 03:45

## 2024-01-01 RX ADMIN — LEVOTHYROXINE SODIUM 112 MCG: 0.11 TABLET ORAL at 10:01

## 2024-01-01 RX ADMIN — CEFTRIAXONE 1 G: 1 INJECTION, POWDER, FOR SOLUTION INTRAMUSCULAR; INTRAVENOUS at 23:21

## 2024-01-01 RX ADMIN — DONEPEZIL HYDROCHLORIDE 5 MG: 5 TABLET ORAL at 21:53

## 2024-01-01 RX ADMIN — VENLAFAXINE HYDROCHLORIDE 37.5 MG: 37.5 CAPSULE, EXTENDED RELEASE ORAL at 10:24

## 2024-01-01 RX ADMIN — ACETAMINOPHEN 500 MG: 500 TABLET, FILM COATED ORAL at 20:54

## 2024-01-01 RX ADMIN — ACETAMINOPHEN 500 MG: 500 TABLET, FILM COATED ORAL at 13:40

## 2024-01-01 RX ADMIN — ENOXAPARIN SODIUM 40 MG: 40 INJECTION SUBCUTANEOUS at 21:10

## 2024-01-01 RX ADMIN — LEVOTHYROXINE SODIUM 112 MCG: 0.11 TABLET ORAL at 12:46

## 2024-01-01 RX ADMIN — Medication 5 MG: at 23:19

## 2024-01-01 RX ADMIN — ENOXAPARIN SODIUM 40 MG: 40 INJECTION SUBCUTANEOUS at 23:19

## 2024-01-01 RX ADMIN — CEPHALEXIN 500 MG: 500 CAPSULE ORAL at 20:42

## 2024-01-01 RX ADMIN — Medication 5 MG: at 21:37

## 2024-01-01 RX ADMIN — SODIUM CHLORIDE: 9 INJECTION, SOLUTION INTRAVENOUS at 21:36

## 2024-01-01 RX ADMIN — VENLAFAXINE HYDROCHLORIDE 37.5 MG: 37.5 CAPSULE, EXTENDED RELEASE ORAL at 09:48

## 2024-01-01 RX ADMIN — SODIUM CHLORIDE: 9 INJECTION, SOLUTION INTRAVENOUS at 12:54

## 2024-01-01 RX ADMIN — SODIUM CHLORIDE 56 ML: 9 INJECTION, SOLUTION INTRAVENOUS at 16:04

## 2024-01-01 RX ADMIN — ENOXAPARIN SODIUM 40 MG: 40 INJECTION SUBCUTANEOUS at 21:52

## 2024-01-01 RX ADMIN — LEVOTHYROXINE SODIUM 112 MCG: 0.11 TABLET ORAL at 09:08

## 2024-01-01 RX ADMIN — ACETAMINOPHEN 500 MG: 500 TABLET, FILM COATED ORAL at 10:24

## 2024-01-01 RX ADMIN — SODIUM CHLORIDE: 9 INJECTION, SOLUTION INTRAVENOUS at 05:42

## 2024-01-01 RX ADMIN — LEVOTHYROXINE SODIUM 112 MCG: 0.11 TABLET ORAL at 13:41

## 2024-01-01 RX ADMIN — ACETAMINOPHEN 500 MG: 500 TABLET, FILM COATED ORAL at 21:30

## 2024-01-01 RX ADMIN — VENLAFAXINE HYDROCHLORIDE 37.5 MG: 37.5 CAPSULE, EXTENDED RELEASE ORAL at 09:08

## 2024-01-01 RX ADMIN — Medication 5 MG: at 21:31

## 2024-01-01 RX ADMIN — ACETAMINOPHEN 500 MG: 500 TABLET, FILM COATED ORAL at 12:11

## 2024-01-01 RX ADMIN — VENLAFAXINE HYDROCHLORIDE 37.5 MG: 37.5 CAPSULE, EXTENDED RELEASE ORAL at 08:33

## 2024-01-01 RX ADMIN — OLANZAPINE 2.5 MG: 2.5 TABLET, FILM COATED ORAL at 08:33

## 2024-01-01 RX ADMIN — ENOXAPARIN SODIUM 40 MG: 40 INJECTION SUBCUTANEOUS at 20:46

## 2024-01-01 RX ADMIN — VENLAFAXINE HYDROCHLORIDE 37.5 MG: 37.5 CAPSULE, EXTENDED RELEASE ORAL at 13:41

## 2024-01-01 RX ADMIN — SODIUM CHLORIDE: 9 INJECTION, SOLUTION INTRAVENOUS at 16:13

## 2024-01-01 RX ADMIN — Medication 1 MG: at 01:03

## 2024-01-01 RX ADMIN — Medication 5 MG: at 21:14

## 2024-01-01 RX ADMIN — LORAZEPAM 0.5 MG: 0.5 TABLET ORAL at 12:40

## 2024-01-01 RX ADMIN — ACETAMINOPHEN 500 MG: 500 TABLET, FILM COATED ORAL at 20:08

## 2024-01-01 RX ADMIN — SODIUM CHLORIDE: 9 INJECTION, SOLUTION INTRAVENOUS at 02:13

## 2024-01-01 RX ADMIN — SODIUM CHLORIDE: 9 INJECTION, SOLUTION INTRAVENOUS at 07:49

## 2024-01-01 RX ADMIN — DONEPEZIL HYDROCHLORIDE 5 MG: 5 TABLET ORAL at 21:10

## 2024-01-01 RX ADMIN — DONEPEZIL HYDROCHLORIDE 5 MG: 5 TABLET ORAL at 21:31

## 2024-01-01 RX ADMIN — ENOXAPARIN SODIUM 40 MG: 40 INJECTION SUBCUTANEOUS at 21:31

## 2024-01-01 RX ADMIN — ACETAMINOPHEN 500 MG: 500 TABLET, FILM COATED ORAL at 16:25

## 2024-01-01 RX ADMIN — ACETAMINOPHEN 500 MG: 500 TABLET, FILM COATED ORAL at 09:08

## 2024-01-01 RX ADMIN — SODIUM CHLORIDE 1000 ML: 9 INJECTION, SOLUTION INTRAVENOUS at 21:42

## 2024-01-01 RX ADMIN — SODIUM CHLORIDE: 9 INJECTION, SOLUTION INTRAVENOUS at 00:33

## 2024-01-01 RX ADMIN — DONEPEZIL HYDROCHLORIDE 5 MG: 5 TABLET ORAL at 21:43

## 2024-01-01 RX ADMIN — CEFTRIAXONE 1 G: 1 INJECTION, POWDER, FOR SOLUTION INTRAMUSCULAR; INTRAVENOUS at 21:04

## 2024-01-01 ASSESSMENT — ACTIVITIES OF DAILY LIVING (ADL)
ADLS_ACUITY_SCORE: 57
ADLS_ACUITY_SCORE: 55
ADLS_ACUITY_SCORE: 57
ADLS_ACUITY_SCORE: 55
ADLS_ACUITY_SCORE: 59
ADLS_ACUITY_SCORE: 42
ADLS_ACUITY_SCORE: 44
ADLS_ACUITY_SCORE: 38
ADLS_ACUITY_SCORE: 55
ADLS_ACUITY_SCORE: 57
ADLS_ACUITY_SCORE: 59
ADLS_ACUITY_SCORE: 55
ADLS_ACUITY_SCORE: 59
ADLS_ACUITY_SCORE: 55
ADLS_ACUITY_SCORE: 59
ADLS_ACUITY_SCORE: 55
ADLS_ACUITY_SCORE: 55
ADLS_ACUITY_SCORE: 38
ADLS_ACUITY_SCORE: 43
ADLS_ACUITY_SCORE: 44
ADLS_ACUITY_SCORE: 59
ADLS_ACUITY_SCORE: 59
ADLS_ACUITY_SCORE: 43
ADLS_ACUITY_SCORE: 53
ADLS_ACUITY_SCORE: 42
ADLS_ACUITY_SCORE: 38
ADLS_ACUITY_SCORE: 53
ADLS_ACUITY_SCORE: 59
ADLS_ACUITY_SCORE: 40
ADLS_ACUITY_SCORE: 59
ADLS_ACUITY_SCORE: 55
ADLS_ACUITY_SCORE: 38
ADLS_ACUITY_SCORE: 44
ADLS_ACUITY_SCORE: 43
ADLS_ACUITY_SCORE: 57
ADLS_ACUITY_SCORE: 59
ADLS_ACUITY_SCORE: 55
ADLS_ACUITY_SCORE: 42
ADLS_ACUITY_SCORE: 36
ADLS_ACUITY_SCORE: 42
ADLS_ACUITY_SCORE: 44
ADLS_ACUITY_SCORE: 44
ADLS_ACUITY_SCORE: 42
ADLS_ACUITY_SCORE: 55
ADLS_ACUITY_SCORE: 43
ADLS_ACUITY_SCORE: 55
ADLS_ACUITY_SCORE: 57
ADLS_ACUITY_SCORE: 59
ADLS_ACUITY_SCORE: 55
ADLS_ACUITY_SCORE: 40
ADLS_ACUITY_SCORE: 42
ADLS_ACUITY_SCORE: 59
ADLS_ACUITY_SCORE: 53
ADLS_ACUITY_SCORE: 43
ADLS_ACUITY_SCORE: 55
ADLS_ACUITY_SCORE: 55
ADLS_ACUITY_SCORE: 41
ADLS_ACUITY_SCORE: 59
ADLS_ACUITY_SCORE: 58
ADLS_ACUITY_SCORE: 42
ADLS_ACUITY_SCORE: 59
ADLS_ACUITY_SCORE: 57
ADLS_ACUITY_SCORE: 44
ADLS_ACUITY_SCORE: 44
ADLS_ACUITY_SCORE: 57
ADLS_ACUITY_SCORE: 55
ADLS_ACUITY_SCORE: 41
ADLS_ACUITY_SCORE: 55
ADLS_ACUITY_SCORE: 53
ADLS_ACUITY_SCORE: 38
ADLS_ACUITY_SCORE: 57
ADLS_ACUITY_SCORE: 55
ADLS_ACUITY_SCORE: 43
ADLS_ACUITY_SCORE: 59
ADLS_ACUITY_SCORE: 59
ADLS_ACUITY_SCORE: 42
ADLS_ACUITY_SCORE: 38
ADLS_ACUITY_SCORE: 57
ADLS_ACUITY_SCORE: 42
ADLS_ACUITY_SCORE: 59
ADLS_ACUITY_SCORE: 41
ADLS_ACUITY_SCORE: 57
ADLS_ACUITY_SCORE: 38
ADLS_ACUITY_SCORE: 41
ADLS_ACUITY_SCORE: 55
ADLS_ACUITY_SCORE: 57
ADLS_ACUITY_SCORE: 59
ADLS_ACUITY_SCORE: 53
ADLS_ACUITY_SCORE: 38
ADLS_ACUITY_SCORE: 59
ADLS_ACUITY_SCORE: 42
ADLS_ACUITY_SCORE: 55
ADLS_ACUITY_SCORE: 55
ADLS_ACUITY_SCORE: 53
ADLS_ACUITY_SCORE: 55
ADLS_ACUITY_SCORE: 57
ADLS_ACUITY_SCORE: 53
ADLS_ACUITY_SCORE: 58
ADLS_ACUITY_SCORE: 53
ADLS_ACUITY_SCORE: 59
ADLS_ACUITY_SCORE: 41
ADLS_ACUITY_SCORE: 55
ADLS_ACUITY_SCORE: 59
ADLS_ACUITY_SCORE: 53
ADLS_ACUITY_SCORE: 43
ADLS_ACUITY_SCORE: 58
ADLS_ACUITY_SCORE: 57
ADLS_ACUITY_SCORE: 59
ADLS_ACUITY_SCORE: 57
ADLS_ACUITY_SCORE: 55
ADLS_ACUITY_SCORE: 38
ADLS_ACUITY_SCORE: 53
ADLS_ACUITY_SCORE: 55
ADLS_ACUITY_SCORE: 59
ADLS_ACUITY_SCORE: 55
ADLS_ACUITY_SCORE: 57
ADLS_ACUITY_SCORE: 53
ADLS_ACUITY_SCORE: 55
ADLS_ACUITY_SCORE: 38
ADLS_ACUITY_SCORE: 59
ADLS_ACUITY_SCORE: 38
ADLS_ACUITY_SCORE: 44
ADLS_ACUITY_SCORE: 43
ADLS_ACUITY_SCORE: 55
ADLS_ACUITY_SCORE: 44
ADLS_ACUITY_SCORE: 55
ADLS_ACUITY_SCORE: 38
ADLS_ACUITY_SCORE: 53
ADLS_ACUITY_SCORE: 42
ADLS_ACUITY_SCORE: 55
ADLS_ACUITY_SCORE: 57
ADLS_ACUITY_SCORE: 55
ADLS_ACUITY_SCORE: 61
ADLS_ACUITY_SCORE: 57
ADLS_ACUITY_SCORE: 44
ADLS_ACUITY_SCORE: 59
ADLS_ACUITY_SCORE: 59
ADLS_ACUITY_SCORE: 38
ADLS_ACUITY_SCORE: 42
ADLS_ACUITY_SCORE: 59
ADLS_ACUITY_SCORE: 55
ADLS_ACUITY_SCORE: 58
ADLS_ACUITY_SCORE: 55
ADLS_ACUITY_SCORE: 55
ADLS_ACUITY_SCORE: 38
ADLS_ACUITY_SCORE: 43
ADLS_ACUITY_SCORE: 55
ADLS_ACUITY_SCORE: 59
ADLS_ACUITY_SCORE: 44
ADLS_ACUITY_SCORE: 61
ADLS_ACUITY_SCORE: 53
ADLS_ACUITY_SCORE: 43
ADLS_ACUITY_SCORE: 53
ADLS_ACUITY_SCORE: 59
ADLS_ACUITY_SCORE: 44
ADLS_ACUITY_SCORE: 44
ADLS_ACUITY_SCORE: 38
ADLS_ACUITY_SCORE: 57
ADLS_ACUITY_SCORE: 55
ADLS_ACUITY_SCORE: 55
ADLS_ACUITY_SCORE: 57
ADLS_ACUITY_SCORE: 59
ADLS_ACUITY_SCORE: 55
ADLS_ACUITY_SCORE: 44
ADLS_ACUITY_SCORE: 53
ADLS_ACUITY_SCORE: 38
ADLS_ACUITY_SCORE: 57
ADLS_ACUITY_SCORE: 41
ADLS_ACUITY_SCORE: 57
ADLS_ACUITY_SCORE: 59
ADLS_ACUITY_SCORE: 55
ADLS_ACUITY_SCORE: 58
ADLS_ACUITY_SCORE: 55
ADLS_ACUITY_SCORE: 55
ADLS_ACUITY_SCORE: 57
ADLS_ACUITY_SCORE: 55
ADLS_ACUITY_SCORE: 55
ADLS_ACUITY_SCORE: 38
ADLS_ACUITY_SCORE: 61
DEPENDENT_IADLS:: CLEANING;COOKING;LAUNDRY;SHOPPING;MEAL PREPARATION;MEDICATION MANAGEMENT;MONEY MANAGEMENT;TRANSPORTATION;INCONTINENCE
ADLS_ACUITY_SCORE: 43
ADLS_ACUITY_SCORE: 53
ADLS_ACUITY_SCORE: 55
ADLS_ACUITY_SCORE: 43
ADLS_ACUITY_SCORE: 59
ADLS_ACUITY_SCORE: 43
ADLS_ACUITY_SCORE: 53
ADLS_ACUITY_SCORE: 42
ADLS_ACUITY_SCORE: 42
ADLS_ACUITY_SCORE: 55
ADLS_ACUITY_SCORE: 57
ADLS_ACUITY_SCORE: 41
ADLS_ACUITY_SCORE: 55
ADLS_ACUITY_SCORE: 53
ADLS_ACUITY_SCORE: 42
ADLS_ACUITY_SCORE: 53
ADLS_ACUITY_SCORE: 44

## 2024-01-01 ASSESSMENT — COLUMBIA-SUICIDE SEVERITY RATING SCALE - C-SSRS
2. HAVE YOU ACTUALLY HAD ANY THOUGHTS OF KILLING YOURSELF IN THE PAST MONTH?: NO
1. IN THE PAST MONTH, HAVE YOU WISHED YOU WERE DEAD OR WISHED YOU COULD GO TO SLEEP AND NOT WAKE UP?: NO
6. HAVE YOU EVER DONE ANYTHING, STARTED TO DO ANYTHING, OR PREPARED TO DO ANYTHING TO END YOUR LIFE?: NO
2. HAVE YOU ACTUALLY HAD ANY THOUGHTS OF KILLING YOURSELF IN THE PAST MONTH?: NO
1. IN THE PAST MONTH, HAVE YOU WISHED YOU WERE DEAD OR WISHED YOU COULD GO TO SLEEP AND NOT WAKE UP?: NO
IS THE PATIENT NOT ABLE TO COMPLETE C-SSRS: REFUSES TO ANSWER
6. HAVE YOU EVER DONE ANYTHING, STARTED TO DO ANYTHING, OR PREPARED TO DO ANYTHING TO END YOUR LIFE?: NO

## 2024-04-21 PROBLEM — R74.8 ELEVATED CPK: Status: ACTIVE | Noted: 2024-01-01

## 2024-04-21 PROBLEM — R31.9 URINARY TRACT INFECTION WITH HEMATURIA, SITE UNSPECIFIED: Status: ACTIVE | Noted: 2024-01-01

## 2024-04-21 PROBLEM — G93.40 ENCEPHALOPATHY: Status: ACTIVE | Noted: 2024-01-01

## 2024-04-21 PROBLEM — N39.0 URINARY TRACT INFECTION WITH HEMATURIA, SITE UNSPECIFIED: Status: ACTIVE | Noted: 2024-01-01

## 2024-04-21 PROBLEM — R79.89 ELEVATED TROPONIN: Status: ACTIVE | Noted: 2024-01-01

## 2024-04-22 NOTE — CONSULTS
Care Management Initial Consult    General Information  Assessment completed with: Spouse or significant otherMaykel  Type of CM/SW Visit: Initial Assessment    Primary Care Provider verified and updated as needed: Yes   Readmission within the last 30 days: no previous admission in last 30 days      Reason for Consult: discharge planning  Advance Care Planning: Advance Care Planning Reviewed: no concerns identified      Communication Assessment  Patient's communication style: spoken language (English or Bilingual)      Cognitive  Cognitive/Neuro/Behavioral: .WDL except, arousability, level of consciousness, mood/behavior, motor response, orientation  Level of Consciousness: obtunded  Arousal Level: arouses to pain                Living Environment:   People in home: spouse     Current living Arrangements: house      Able to return to prior arrangements: yes     Family/Social Support:  Care provided by: spouse/significant other  Provides care for: no one  Marital Status:     Maykel       Description of Support System: Supportive, Involved    Support Assessment: Adequate family and caregiver support, Adequate social supports    Current Resources:   Patient receiving home care services: No  Community Resources: None  Equipment currently used at home:    Supplies currently used at home: None      Lifestyle & Psychosocial Needs:  Social Determinants of Health     Food Insecurity: Not on file   Depression: Not at risk (9/22/2023)    Received from [email or web address]    PHQ-2     PHQ-2 Score: 0   Housing Stability: Not on file   Tobacco Use: Unknown (4/21/2024)    Patient History     Smoking Tobacco Use: Never     Smokeless Tobacco Use: Unknown     Passive Exposure: Not on file   Financial Resource Strain: Not on file   Alcohol Use: Unknown (11/25/2019)    Received from [email or web address]    AUDIT-C     Frequency of Alcohol Consumption: 2-3 times a week     Average Number of Drinks: 1  or 2     Frequency of Binge Drinking: Not on file   Transportation Needs: Not on file   Physical Activity: Not on file   Interpersonal Safety: Not on file   Stress: Not on file   Social Connections: Not on file   Health Literacy: Not on file       Functional Status:  Prior to admission patient needed assistance:   Dependent ADLs:: Bathing, Dressing, Grooming  Dependent IADLs:: Cooking, Cleaning, Laundry, Shopping, Medication Management, Meal Preparation, Money Management, Transportation       Additional Information:  Patient was admitted on 4/21/24 with UTI and bacteremia. Care management consulted to assist with discharge planning.     Consult completed via phone with spouse Maykel. Address, contact info, and PCP confirmed. Physical therapist is assigned to see patient today at 1615. Will monitor recommendations for safe discharge plan. Spouse states he will be able to drive patient back to home tomorrow as long as patient is medically ready. He will be unavailable in the morning due to appts but can transport in the afternoon. Will continue to follow for discharge planning.         Mandy Chávez RN  Care Coordinator  [facility]  [phone]

## 2024-04-22 NOTE — PHARMACY-ADMISSION MEDICATION HISTORY
Pharmacist Admission Medication History    Admission medication history is complete. The information provided in this note is only as accurate as the sources available at the time of the update.    Information Source(s): Family member via in-person    Pertinent Information: -    Changes made to PTA medication list:  Added: None  Deleted: None  Changed: None    Allergies reviewed with patient and updates made in EHR: unable to assess.  Patient's  reports No Known Allergies.    Medication History Completed By: Sindy Glover RPH 4/22/2024 11:11 AM    PTA Med List   Medication Sig Last Dose    acetaminophen (TYLENOL) 500 MG tablet Take 1-2 tablets (500-1,000 mg) by mouth every 6 hours as needed for mild pain Unknown    donepezil (ARICEPT) 5 MG tablet Take 5 mg by mouth at bedtime 4/20/2024 at HS    levothyroxine (SYNTHROID/LEVOTHROID) 112 MCG tablet Take 112 mcg by mouth daily 4/20/2024    venlafaxine (EFFEXOR XR) 37.5 MG 24 hr capsule Take 1 capsule by mouth daily 4/21/2024

## 2024-04-22 NOTE — H&P
[facility]  Hospitalist Admission Note  Name: Elas Jackson    MRN: [id number]  YOB: 1950    Age: 73 year old  Date of admission: 4/21/2024  Primary care provider: Annia, [facility]    Chief Complaint:  lethargy, confusion    Assessment and Plan:   UTI: Patient has been very lethargic and confused for the last 3 days.  Has not had any fevers although they have not checked a temperature.  Not reporting any new symptoms although is not really saying much to her spouse.  She cannot provide any history.  In the ER blood pressure is slightly elevated, heart rate 97, temperature 100.5  F.  WBC count is 8.7, CK elevated at 408, lactic acid 0.6, LFTs normal, COVID-19, influenza A/B, RSV PCR is negative, chest x-ray does not show infiltrate, and urinalysis is grossly abnormal consistent with UTI with >182 WBCs and large LE.  -Continue 1 g IV ceftriaxone every 24 hours    Acute infectious encephalopathy  Lewy body dementia: She has Lewy body dementia and follows with neurology as recently as 2 months ago and OT at that time where she scored in the moderate to severe dementia range on the MOCA exam.  She takes Aricept 5 mg at bedtime and Effexor XR 37.5 mg daily.  She is sleeping soundly for me in the ER and does not answer any questions when I wake her up and quickly falls back asleep.  I did speak with the patient's spouse by phone.  At baseline the patient is able to hold a conversation.  Lives at home with spouse.  She has not been eating much and losing weight.  She has been very lethargic sleeping most of the day and confused for the last 3 days.  He asked her questions and she will not really respond and fall asleep easily.  She did have a fall 6 days ago from bed and hit the back of her head which had a small amount of blood.  She was seen at urgent care for this.  CT head unremarkable.  BMP fairly unremarkable.  She is not hypercapnic.  UA is consistent with UTI as a source  of her acute encephalopathy.  Also appears to be mildly dehydrated.  -IV fluids overnight and IV antibiotics  -Fall precautions  -Consult PT and social work  -Resume PTA Aricept and Effexor  -Per neurology note the patient and spouse took her off Seroquel as they states she had a seizure on this medication so if there are agitation issues maybe try Haldol instead    CKD stage II-III  Elevated CK: Creatinine baseline appears to be about 1-1.1 although it was as low as 0.8 last year.  CK mildly elevated at 408.  She did not eat or drink a lot.  She has a UTI as above appears mildly dehydrated on exam.  -Received 1 L NS in the ER, continue NS at 100 mL/h for 10 hours  -Repeat CK and BMP in the morning    Elevated troponin: Troponin T slightly elevated at 20.  She cannot provide any history regarding chest pain.  EKG does not show ischemic change.  Here with UTI encephalopathy and I would doubt cardiac etiology for this presentation.  A repeat troponin is pending and if not rising significantly would not pursue further workup.    Hypothyroidism: Resume PTA levothyroxine.    History L1 burst fracture: This was in 2023.      DVT Prophylaxis: Pneumatic Compression Devices  Code Status: No CPR in event of cardiac arrest, for now prearrest intubation okay.  I spoke with the patient's spouse overnight via phone.  He agreed with no CPR and allow natural death if she does not have a pulse.  However, he would like things attempted if she had a pulse so that he and her son to come in to see goodbye so for now I have left intubation as an option for prearrest respiratory failure in her code status  FEN: Regular diet when she is more alert, NS at 100 mL/h for 10 hours  Discharge Dispo: Lives at home with spouse.  Consult [facility] [place].  Estimated Disch Date / # of Days until Disch: Admit inpatient for UTI and acute encephalopathy.  Anticipate she will be here for a 2 night hospitalization.      History of Present Illness:  Elsa Jackson  is a 73 year old female with PMH including Lewy body dementia, CKD stage II-III, hypothyroidism, and L1 burst fracture who presents with confusion and lethargy.  Patient is not able to provide me really any history as she is asleep and will not answer my questions when I wake her up.  She quickly falls asleep after this.  I spoke to her spouse who she lives with for further information.  For the last 3 days or so she has been very lethargic and sleeping most of the time.  When he does talk to her she rarely responds which is abnormal for her.  She normally can hold a conversation.  He is not sure if she had a fever as they have not checked a temperature.  She has not been reporting any new symptoms or pain.  He has not noticed any vomiting.  She has not been eating and drinking much and has been losing weight.  The last 3 days she is eating and drink very little.  She did fall out of bed 6 days ago and they were seen in urgent care.  She had a bump on the back of the head with a small amount of blood.  She does not on anticoagulation.    History obtained from patient's spouse secondary to her acute encephalopathy, medical record, and from Dr. Peck in the emergency department.  Blood pressure 154/92, heart rate 97, temperature 100.5  F, oxygen 90% on room air.  Noncontrast head CT unremarkable for any acute pathology.  Chest x-ray negative for infiltrate.  WBC 8.7, hemoglobin 11.8, platelet count 232.  Creatinine 1.1 and BUN 36 otherwise BMP unremarkable.  CK elevated at 408.  VBG shows pH 7.45, pCO2 42, pO2 24, bicarb 29.  LFTs within normal limits.  Lactic acid is 0.6.  Troponin T is slightly elevated at 20.  COVID-19, influenza A/B, RSV PCR is negative.  Procalcitonin 0.39.  EKG shows NSR with no ST elevation or depression.  Urinalysis consistent with UTI with >182 WBCs, large LE.  She received 1 g IV ceftriaxone, acetaminophen, and 1 L NS.  Admit inpatient due to ongoing encephalopathic state.  Anticipate 2  night hospitalization.       Clinically Significant Risk Factors Present on Admission                                            Past Medical History reviewed:  Past Medical History:   Diagnosis Date    Chronic kidney disease, stage II (mild)     Hypothyroidism     L1 vertebral fracture (H)     Lewy body dementia (H)      Past Surgical History reviewed:  No past surgical history on file.  Social History reviewed:  Social History     Tobacco Use    Smoking status: Never    Smokeless tobacco: Not on file   Substance Use Topics    Alcohol use: Not Currently     Social History     Social History Narrative    Not on file     Family History reviewed:  Family History   Problem Relation Age of Onset    Heart Disease Father      Allergies:  No Known Allergies  Medications:  Prior to Admission medications    Medication Sig Last Dose Taking? Auth Provider Long Term End Date   donepezil (ARICEPT) 5 MG tablet Take 5 mg by mouth at bedtime  Yes Reported, Patient     venlafaxine (EFFEXOR XR) 37.5 MG 24 hr capsule Take 1 capsule by mouth daily  Yes Reported, Patient Yes    acetaminophen (TYLENOL) 500 MG tablet Take 1-2 tablets (500-1,000 mg) by mouth every 6 hours as needed for mild pain   Peterson Del Toro MD     levothyroxine (SYNTHROID/LEVOTHROID) 112 MCG tablet Take 112 mcg by mouth daily   Unknown, Entered By History Yes      Review of Systems:  Unable to complete due to her encephalopathic status.  See HPI for details.     Physical Exam:  Blood pressure (!) 154/98, pulse 97, temperature (!) 100.5  F (38.1  C), temperature source Oral, resp. rate 18, SpO2 97%.  Wt Readings from Last 1 Encounters:   01/10/23 59 kg (130 lb)     Exam:  Constitutional: Asleep on arrival, no acute distress  Eyes: sclera white   HEENT: Dry mucous membranes  Respiratory: no respiratory distress, anterior lungs cta bilaterally, no crackles or wheeze  Cardiovascular: RRR.  1/6 systolic murmur  GI: Thin, soft and not distended, does not  appear to be tender to palpation, bowel sounds present  Skin: Ecchymoses to left shin, no rash  Musculoskeletal/extremities: No significant lower extremity edema  Neurologic: Somnolent.  Briefly opens eyes to stimulation, but falls right back to sleep.  Will not answer any questions.  Psychiatric: calm     Lab and imaging data personally reviewed:  Labs:  Recent Labs   Lab 04/21/24 2128   PHV 7.45*   PO2V 24*   PCO2V 42   HCO3V 29*     Recent Labs   Lab 04/21/24 2107   WBC 8.7   HGB 11.8   HCT 35.5   MCV 96        Recent Labs   Lab 04/21/24 2107      POTASSIUM 4.4   CHLORIDE 103   CO2 27   ANIONGAP 10   *   BUN 36.1*   CR 1.10*   GFRESTIMATED 52*   MYNOR 9.8   PROTTOTAL 8.0   ALBUMIN 4.2   BILITOTAL 1.0   ALKPHOS 90   AST 27   ALT 16     Recent Labs   Lab 04/21/24 2128   LACT 0.6     Recent Labs   Lab 04/21/24 2122   COLOR Yellow   APPEARANCE Slightly Cloudy*   URINEGLC Negative   URINEBILI Negative   URINEKETONE Negative   SG 1.013   UBLD Trace*   URINEPH 6.0   PROTEIN 50*   NITRITE Negative   LEUKEST Large*   RBCU 6*   WBCU >182*       Troponin T 20  Procalcitonin 0.39    EKG: NSR, no ST elevation or depression    Imaging:  Recent Results (from the past 24 hour(s))   Head CT w/o contrast    Narrative    EXAM: CT HEAD W/O CONTRAST  LOCATION: [facility]  DATE: 4/21/2024    INDICATION: Altered mental status  COMPARISON:  CT head 01/10/2023.  TECHNIQUE: Routine CT Head without IV contrast. Multiplanar reformats. Dose reduction techniques were used.    FINDINGS:  INTRACRANIAL CONTENTS: No intracranial hemorrhage, extraaxial collection, or mass effect.  No CT evidence of acute infarct. Moderate presumed chronic small vessel ischemic changes. Mild to moderate generalized volume loss. No hydrocephalus.     VISUALIZED ORBITS/SINUSES/MASTOIDS: No intraorbital abnormality. No significant paranasal sinus mucosal disease. No middle ear or mastoid effusion.    BONES/SOFT  TISSUES: No acute abnormality.      Impression    IMPRESSION:  1.  No acute intracranial process or significant change since 01/10/2023.   XR Chest 2 Views    Narrative    EXAM: XR CHEST 2 VIEWS  LOCATION: [facility]  DATE: 4/21/2024    INDICATION: fall; sepsis  COMPARISON: None.      Impression    IMPRESSION: Negative chest.       Mitul Gooden MD  Hospitalist  [facility]

## 2024-04-22 NOTE — PROGRESS NOTES
"   04/22/24 1646   Appointment Info   Signing Clinician's Name / Credentials (PT) Geocherie Long DPT   Living Environment   People in Home spouse   Current Living Arrangements house   Home Accessibility stairs to enter home   Number of Stairs, Main Entrance 1   Transportation Anticipated family or friend will provide   Living Environment Comments Pt lives in house in spouse, spouse helps with ADLs and IADLs.   Self-Care   Usual Activity Tolerance moderate   Current Activity Tolerance moderate   Equipment Currently Used at Home none   Fall history within last six months yes   Number of times patient has fallen within last six months 3   Activity/Exercise/Self-Care Comment Pt get assistance with bathing (gettting in/out of shower), toileting sometimes, and ambulates w/o AD within home, gets help from spouse for stairs.   General Information   Onset of Illness/Injury or Date of Surgery 04/21/24   Referring Physician Mitul Gooden MD   Pertinent History of Current Problem (include personal factors and/or comorbidities that impact the POC) Pt is 72 yo female who, per chart, \"PMH including Lewy body dementia, CKD stage II-III, hypothyroidism, and L1 burst fracture who presents with confusion and lethargy found to have a UTI and bacteremia.  Patient is not able to provide  any meaningful history as she was lethargic on admission and will not answer  questions when woken up.  Info was gotten from her spouse whom she lives with.  For the last 3 days or so prior to admission, she has been very lethargic and sleeping most of the time.  When he does talk to her she rarely responds which is abnormal for her.  She normally can hold a conversation.  He is not sure if she had a fever as they have not checked a temperature.  She has not been reporting any new symptoms or pain.  She was not having any vomiting.  She has not been eating and drinking much though and has been losing weight.    She did fall out of bed 6 " "days prior to admission and they were seen in urgent care.  She had a bump on the back of the head with a small amount of blood.  She does not on anticoagulation.\"   Existing Precautions/Restrictions fall   Cognition   Affect/Mental Status (Cognition) confabulatory;confused   Cognitive Status Comments having active hallucinations during session. Confused. Pleasant throughout.   Pain Assessment   Patient Currently in Pain No   Integumentary/Edema   Integumentary/Edema no deficits were identifed   Posture    Posture Forward head position   Range of Motion (ROM)   Range of Motion ROM is WFL   Strength (Manual Muscle Testing)   Strength (Manual Muscle Testing) Able to perform R SLR;Deficits observed during functional mobility   Strength Comments difficulty with L SLR, unclear if pt confused with cues or if due to weakness.   Bed Mobility   Bed Mobility supine-sit   Comment, (Bed Mobility) IND   Transfers   Transfers sit-stand transfer   Comment, (Transfers) SBA, no AD.   Gait/Stairs (Locomotion)   Comment, (Gait/Stairs) Pt ambulated 10' w/o AD, CG-Bahman, step through pattern, NBOS, lateral gait deviations, easily distracted during gait, and occasional scissoring pattern (feet crossing over).   Balance   Balance Comments unsteady with gait on this date w/o AD.   Sensory Examination   Sensory Perception Comments light touch appears intact per screen distal B LEs. Difficult to assess due to confusion.   Clinical Impression   Criteria for Skilled Therapeutic Intervention Yes, treatment indicated   PT Diagnosis (PT) impaired functional mobility   Influenced by the following impairments decreased strength, confusion, impaired balance.   Functional limitations due to impairments difficulty with transfers and ambulation   Clinical Presentation (PT Evaluation Complexity) stable   Clinical Presentation Rationale clinical judgment   Clinical Decision Making (Complexity) low complexity   Planned Therapy Interventions (PT) balance " training;bed mobility training;gait training;home exercise program;neuromuscular re-education;ROM (range of motion);stair training;strengthening;stretching;transfer training;progressive activity/exercise   Risk & Benefits of therapy have been explained evaluation/treatment results reviewed;care plan/treatment goals reviewed;risks/benefits reviewed;current/potential barriers reviewed;participants voiced agreement with care plan;participants included;patient   PT Total Evaluation Time   PT Eval, Low Complexity Minutes (44647) 10   Physical Therapy Goals   PT Frequency Daily   PT Predicted Duration/Target Date for Goal Attainment 04/29/24   PT Goals Bed Mobility;Transfers;Gait;Stairs   PT: Bed Mobility Independent;Supine to/from sit   PT: Transfers Modified independent;Sit to/from stand;Assistive device   PT: Gait Modified independent;Standard walker;Greater than 200 feet   PT: Stairs 1 stair;Supervision/stand-by assist;Assistive device  (with use of walker)   Interventions   Interventions Quick Adds Gait Training;Therapeutic Activity   Therapeutic Activity   Therapeutic Activities: dynamic activities to improve functional performance Minutes (62502) 15   Symptoms Noted During/After Treatment None   Treatment Detail/Skilled Intervention Pt supine in bed upon PT arrival. Pt performed STS x4 during session, from EOB and toilet, SB-CGA. Cues on UE placement and use of grab bar in bathroom. Pt needing Ax1 for pericares, confused with cues to use wipes for cleaning sherman areas. Pt performed sit > supine IND. time for education on use of walker for balance provided to pt and spouse. Spouse reported concerns of increased confusion and pt recently leaving home in the middle of the night (neighbors bring pt back). Educated pt and spouse on SW/CC consult and for pending discussion on increased assistance level or possible facility to help with cares. Spouse verbalized understanding. Pt supine, alarm on, all needs within reach.    Gait Training   Gait Training Minutes (77450) 8   Symptoms Noted During/After Treatment (Gait Training) fatigue   Treatment Detail/Skilled Intervention Pt ambulated a total of ~600' without FWW initially, needing CG-Bahman due to unsteadiness. Provided FWW and pt demonstrates improved balance, increased gait speed. Cues on walker proximity and to remain within confines of walker during turns and pivots. Pt demonstrates. Last 100' w/o AD again and close SBA. Pt demonstrates mild improvement with straight forward ambulation and cued for increased gait speed.   PT Discharge Planning   PT Plan progress IND with gait, using walker. Trial 1 platform step with walker. Progress gait away from AD as able.   PT Discharge Recommendation (DC Rec) home with assist;Long term care facility   PT Rationale for DC Rec Pt at baseline lives in house with spouse, ambulates w/o AD, but needing help with ADLs and IADLs due to confusion related to PMH of lewy body dementia. Pt is currently below baseline, deficits in balance primarily and with increased confusion. Recommending FWW use for ambulation at this time. Will reassess during IP PT. Pt will likely require increased level of assistance in long-term given confusion (spouse reports pt leaving home in middle of the night) and medical needs, memory care or other LTC would be beneficial. If spouse able to provide Ax1, pt from mobility standpoint is safe to return home otherwise.   PT Brief overview of current status FWW and Ax1   Total Session Time   Timed Code Treatment Minutes 23   Total Session Time (sum of timed and untimed services) 33

## 2024-04-22 NOTE — PROGRESS NOTES
[facility]    Medicine Progress Note - Hospitalist Service    Date of Admission:  4/21/2024    Primary Care Physician   [facility]  CONSULTANTS: none     Assessment & Plan     Elsa Jackson is a 73 year old female with PMH including Lewy body dementia, CKD stage II-III, hypothyroidism, and L1 burst fracture who presents with confusion and lethargy found to have a UTI and bacteremia.  Patient is not able to provide  any meaningful history as she was lethargic on admission and will not answer  questions when woken up.  Info was gotten from her spouse whom she lives with.  For the last 3 days or so prior to admission, she has been very lethargic and sleeping most of the time.  When he does talk to her she rarely responds which is abnormal for her.  She normally can hold a conversation.  He is not sure if she had a fever as they have not checked a temperature.  She has not been reporting any new symptoms or pain.  She was not having any vomiting.  She has not been eating and drinking much though and has been losing weight.    She did fall out of bed 6 days prior to admission and they were seen in urgent care.  She had a bump on the back of the head with a small amount of blood.  She does not on anticoagulation.     History obtained from patient's spouse secondary to her acute encephalopathy, medical record, and from Dr. Peck in the emergency department.  Blood pressure 154/92, heart rate 97, temperature 100.5  F, oxygen 90% on room air.  Noncontrast head CT unremarkable for any acute pathology.  Chest x-ray negative for infiltrate.  WBC 8.7, hemoglobin 11.8, platelet count 232.  Creatinine 1.1 and BUN 36 otherwise BMP unremarkable.  CK elevated at 408.  VBG shows pH 7.45, pCO2 42, pO2 24, bicarb 29.  LFTs within normal limits.  Lactic acid is 0.6.  Troponin T is slightly elevated at 20.  COVID-19, influenza A/B, RSV PCR is negative.  Procalcitonin 0.39.  EKG shows NSR with  no ST elevation or depression.  Urinalysis consistent with UTI with >182 WBCs, large LE.  She received 1 g IV ceftriaxone, acetaminophen, and 1 L NS.  Admit inpatient due to ongoing encephalopathic state.  Anticipate 2 night hospitalization.         UTI, gram negative bacteremia duet E coli:   Patient has been very lethargic and confused for the last 3 days prior to admission.  Has not had any fevers although they have not checked a temperature.  Not reporting any new symptoms although is not really saying much to her spouse.  She cannot provide any history.  In the ER blood pressure is slightly elevated, heart rate 97, temperature 100.5  F.  WBC count was 8.7, CK elevated at 408, lactic acid 0.6, LFTs normal, COVID-19, influenza A/B, RSV PCR is negative, chest x-ray does not show infiltrate, and urinalysis is grossly abnormal consistent with UTI with >182 WBCs and large LE.. she was placed on Continue 1 g IV ceftriaxone every 24 hours.  Blood culture has come back with gram negative bacilli with sensitivities pending. Urine culture pending. Verigene pending. I am hopeful to change to oral antibiotics tomorrow based on culture results.  Given the patient's dementia and lethargy she is unable to tell me about any possible symptoms she could be having for UTI.   Addendum; Verigene back with e coli, sensitivities pending.  I saw the patient again in the afternoon with  bedside and she is already doing better.      Acute infectious encephalopathy, Lewy body dementia, polypharmacy:   She has Lewy body dementia and follows with neurology as recently as 2 months ago and OT at that time where she scored in the moderate to severe dementia range on the MOCA exam.  She takes Aricept and Effexor.  She is lethargic with her infection as above. Per spouse, at baseline the patient is able to hold a conversation.  Lives at home with spouse.  She has not been eating much and losing weight.  She has been very lethargic  sleeping most of the day and confused for the last 3 days prior to admission.  He asked her questions and she will not really respond and fall asleep easily.  She did have a fall 6 days ago from bed and hit the back of her head which had a small amount of blood.  She was seen at urgent care for this.  CT head unremarkable.  BMP fairly unremarkable.  She is not hypercapnic.  UA is consistent with UTI as a source of her acute encephalopathy.  Also appears to be mildly dehydrated.  Patient treated with antibiotics and IV antibiotics.  Her home aricept and effexor could be causing side effects and playing a role in her somnolence.  Will hold both as polypharmacy could be a contributing factor. Per neurology note the patient and spouse took her off Seroquel as they states she had a seizure on this medication so if there are agitation issues maybe try Haldol instead.  Patient is at high risk for delirium and behavioral issues.  She has pneumoboots on and has an IV in place and any tethers could be a contributing factor to her agitation going forward.     Acute prerenal renal failure on chronic kidney disease stage II-III, Elevated CK:   Creatinine baseline appears to be about 1-1.1 although it was as low as 0.8 last year.  CK mildly elevated at 408.  She has not been eating or drinking much lately.  She has a UTI as above appears mildly dehydrated on exam. She was given fluids via IV.  Her creatinine is up to 1.19.  will repeat in the am.  Will continue on IV fluids.        Elevated troponin: Troponin T slightly elevated at 20.  She cannot provide any history regarding chest pain.  EKG does not show ischemic change.  Here with UTI encephalopathy and I would doubt cardiac etiology for this presentation.  A repeat troponin is stable.  No plans for further workup       Hypothyroidism: Resume PTA levothyroxine.     History L1 burst fracture: This was in 2023.        4Ms:  Polypharmacy: Medications reviewed.  Given her  lethargy her home Effexor and Aricept could be playing a role and need to be really concerned about resuming these.  In the setting of her infection however that could be the cause as well.  Holding these medications for now.  Mentation:   Capacity patient does not have full capacity make her own medical decisions.  She depends on her  whom she lives with to help with this.  Social support: Lives at home with her   Mobility: Unknown  What is importmant: To be at home with her       Discussed plan of care with nursing in the emergency room, left a message for the patient's  by phone, addendum met  bedside      Diet: Combination Diet Regular Diet Adult    DVT Prophylaxis: Pneumatic Compression Devices  Borjas Catheter: Not present  Lines: None     Cardiac Monitoring: None    RESTRAINTS: Not indicated  Code Status: No CPR- Pre-arrest intubation OK         This document was created using voice recognition technology.  Please excuse any typographical errors that may have occurred.  Please call with any questions.          Clinically Significant Risk Factors Present on Admission                                  Disposition Plan      Expected Discharge Date: 04/23/2024                  Barrier to discharge: Lethargy needs to be improved, also cultures need to be back with sensitivities  Medically Ready for Discharge: Anticipated Tomorrow       Jarrod Coronel MD  Hospitalist Service  [facility]  Securely message with [employer] (more info)  Text page via [email or web address] Paging/Directory   ______________________________________________________________________    Interval History   Patient is new to me.  She is lethargic and unable to give me any sort of history.  Unable to wake her up but she falls asleep quickly.  History gotten by nursing as well as the chart.    ROS: A comprehensive review of systems was unobtainable due to patient's lethargy    Physical Exam   Vital Signs:  Temp: 97.8  F (36.6  C) Temp src: Oral BP: (!) 145/80 Pulse: 75   Resp: 16 SpO2: 100 % O2 Device: Nasal cannula Oxygen Delivery: 2 LPM  Weight: 0 lbs 0 oz      General appearance: Patient is lethargic, opens eyes to voice but goes right back to sleep, no apparent distress, pleasant and conversing normally, speaking in full sentences, appears elderly and frail.  Looks much older than stated age  HEENT:   Mucous membranes are on the dry side  NECK:  supple without bruit or lymphadenopathy  RESPIRATORY: Clear to auscultation bilateral, good air movement  CARDIOVASCULAR: Regular rate and rhythm, normal S1/S2, soft 1/6 systolic murmur  GASTROINTESTINAL: Non-distended, thin, non-tender, soft, bowel sounds present throughout  NEUROLOGIC: Unable to test due to lethargy, does not appear to have any focal deficits  EXTREMITIES:  Moves all extremities, no clubbing, cyanosis, nor edema  :  Borjas not present         Data     I have personally reviewed the following data over the past 24 hrs:    8.7  \   11.8   / 232     143 110 (H) 29.2 (H) /  93   4.1 23 1.19 (H) \     ALT: 16 AST: 27 AP: 90 TBILI: 1.0   ALB: 4.2 TOT PROTEIN: 8.0 LIPASE: N/A     Trop: 22 (H) BNP: N/A     Procal: 0.39 CRP: N/A Lactic Acid: 0.6         Imaging:   Results for orders placed or performed during the hospital encounter of 04/21/24   Head CT w/o contrast    Narrative    EXAM: CT HEAD W/O CONTRAST  LOCATION: [facility]  DATE: 4/21/2024    INDICATION: Altered mental status  COMPARISON:  CT head 01/10/2023.  TECHNIQUE: Routine CT Head without IV contrast. Multiplanar reformats. Dose reduction techniques were used.    FINDINGS:  INTRACRANIAL CONTENTS: No intracranial hemorrhage, extraaxial collection, or mass effect.  No CT evidence of acute infarct. Moderate presumed chronic small vessel ischemic changes. Mild to moderate generalized volume loss. No hydrocephalus.     VISUALIZED ORBITS/SINUSES/MASTOIDS: No intraorbital  abnormality. No significant paranasal sinus mucosal disease. No middle ear or mastoid effusion.    BONES/SOFT TISSUES: No acute abnormality.      Impression    IMPRESSION:  1.  No acute intracranial process or significant change since 01/10/2023.   XR Chest 2 Views    Narrative    EXAM: XR CHEST 2 VIEWS  LOCATION: [facility]  DATE: 4/21/2024    INDICATION: fall; sepsis  COMPARISON: None.      Impression    IMPRESSION: Negative chest.     Procedures: None  I have personally have reviewed the patient's most up to date radiologic exams,  labs, orders, and medications myself

## 2024-04-22 NOTE — PLAN OF CARE
Goal Outcome Evaluation:       [facility]    ED Boarding Nurse Handoff Addendum Report:    Date/time: 4/22/2024, 1:23 PM    Activity Level:       Fall Risk: Yes:  nonskid shoes/slippers when out of bed, patient and family education, and activity supervised    Active Infusions: Yes- NS    Current Meds Due: no    Current care needs: O2 management    Oxygen requirements (liters/min and/or FiO2): weaned    Respiratory status: Room air    Vital signs (within last 30 minutes):    Vitals:    04/22/24 0403 04/22/24 0747 04/22/24 1202 04/22/24 1218   BP: 99/68 (!) 145/80 114/79    BP Location: Left arm Left arm Right arm    Pulse: 69 75 80    Resp: 18 16 20    Temp: 97.9  F (36.6  C) 97.8  F (36.6  C) 98.1  F (36.7  C)    TempSrc: Oral Oral Oral    SpO2: 99% 100% 95% 95%       Focused assessment within last 30 minutes:    Pt A&O x3- Tolerating regular diet well. Pt voided small amounts. BS 1  ml, pt declines needing to go. Pt on RA currently, weaned from 1 L NC.     ED Boarding Nurse name: Mary Bull RN

## 2024-04-22 NOTE — ED TRIAGE NOTES
BIBA from home for decreased LOC. Pt has hx of dementia but is normally able to hold a conversation,  states he's been unable to hold a normal conversation. Pt is talking but not following commands and not making sense.    states to EMS that pt fell 3 days ago and this is when her LOC started to decline. Denies blood thinners. ABCs intact

## 2024-04-22 NOTE — ED PROVIDER NOTES
History     Chief Complaint:  Altered Mental Status and Fall     HPI   Elsa Jackson is a 73 year old female with history of dementia and hypothyroidism who presents via EMS for evaluation of altered mental status and after a fall.  Patient's  notes that patient roughly 3 days ago had a fall from her bed.  This was unwitnessed though patient's  noted that he believes she was on the ground for 3 to 4 hours.  He notes that she had some bleeding from her head though this subsided.  She went to clinic the following day was medically cleared.  Over the past few days however patient has had increasing confusion and has had more challenges holding a normal conversation.  She typically is able to follow commands and converse on a regular basis.  She does not ambulate with a cane or walker.  No reported fever, cough, difficulty breathing, abdominal pain, vomiting or other symptoms per .  No recent antibiotics, hospitalizations.  No history of anticoagulation.      Independent Historian:    None    Review of External Notes:    4/15/24 urgent care note    Medications:    Aricept  Effexorxr  Synthroid    Past Medical History:    Colon polyp  Hypothyroidism  Lewy body dementia  Osteoporosis  Synovial cyst of lumbar facet joint  Closed unstable burst fracture of first lumbar vertebra    Physical Exam   Patient Vitals for the past 24 hrs:   BP Temp Temp src Pulse Resp SpO2   04/21/24 2103 (!) 154/92 (!) 100.5  F (38.1  C) Oral 97 18 97 %        Physical Exam  Nursing note and vitals reviewed.  Constitutional: Well nourished.   Head: atraumatic  Eyes: Conjunctiva normal.  Pupils are equal, round, and reactive to light.   ENT: Nose normal. Mucous membranes pink and moist.    Neck: Normal range of motion.  CVS: Normal rate, regular rhythm.  Normal heart sounds.    Pulmonary: Lungs clear to auscultation bilaterally. No wheezes/rales/rhonchi.  GI: Abdomen soft. Nontender, nondistended. No rigidity or guarding.     MSK: No calf tenderness or swelling. No c/t/l bony tenderness  Neuro: Somnolent though arouses to painful stimuli and withdrawls equally in all extremities  Skin: Skin is warm and dry. No rash noted.   Psychiatric:Flat affect      Emergency Department Course   ECG  ECG taken at 2128, ECG read at 2128  Normal sinus rhythm   Rate 94 bpm. UT interval 142 ms. QRS duration 68 ms. QT/QTc 366/457 ms. P-R-T axes 38 28 69.    Imaging:  No orders to display       Laboratory:  Labs Ordered and Resulted from Time of ED Arrival to Time of ED Departure   BASIC METABOLIC PANEL - Abnormal       Result Value    Sodium 140      Potassium 4.4      Chloride 103      Carbon Dioxide (CO2) 27      Anion Gap 10      Urea Nitrogen 36.1 (*)     Creatinine 1.10 (*)     GFR Estimate 52 (*)     Calcium 9.8      Glucose 119 (*)    CBC WITH PLATELETS AND DIFFERENTIAL - Abnormal    WBC Count 8.7      RBC Count 3.70 (*)     Hemoglobin 11.8      Hematocrit 35.5      MCV 96      MCH 31.9      MCHC 33.2      RDW 12.0      Platelet Count 232      % Neutrophils 94      % Lymphocytes 5      % Monocytes 1      % Eosinophils 0      % Basophils 0      % Immature Granulocytes 0      NRBCs per 100 WBC 0      Absolute Neutrophils 8.1      Absolute Lymphocytes 0.4 (*)     Absolute Monocytes 0.1      Absolute Eosinophils 0.0      Absolute Basophils 0.0      Absolute Immature Granulocytes 0.0      Absolute NRBCs 0.0     ROUTINE UA WITH MICROSCOPIC - Abnormal    Color Urine Yellow      Appearance Urine Slightly Cloudy (*)     Glucose Urine Negative      Bilirubin Urine Negative      Ketones Urine Negative      Specific Gravity Urine 1.013      Blood Urine Trace (*)     pH Urine 6.0      Protein Albumin Urine 50 (*)     Urobilinogen Urine Normal      Nitrite Urine Negative      Leukocyte Esterase Urine Large (*)     Bacteria Urine Few (*)     WBC Clumps Urine Present (*)     RBC Urine 6 (*)     WBC Urine >182 (*)     Squamous Epithelials Urine 1     TROPONIN T,  HIGH SENSITIVITY - Abnormal    Troponin T, High Sensitivity 20 (*)    CK TOTAL - Abnormal     (*)    ISTAT GASES LACTATE VENOUS POCT - Abnormal    Lactic Acid POCT 0.6      Bicarbonate Venous POCT 29 (*)     O2 Sat, Venous POCT 45 (*)     pCO2 Venous POCT 42      pH Venous POCT 7.45 (*)     pO2 Venous POCT 24 (*)    TROPONIN T, HIGH SENSITIVITY - Abnormal    Troponin T, High Sensitivity 22 (*)    INFLUENZA A/B, RSV, & SARS-COV2 PCR - Normal    Influenza A PCR Negative      Influenza B PCR Negative      RSV PCR Negative      SARS CoV2 PCR Negative     HEPATIC FUNCTION PANEL - Normal    Protein Total 8.0      Albumin 4.2      Bilirubin Total 1.0      Alkaline Phosphatase 90      AST 27      ALT 16      Bilirubin Direct 0.21     PROCALCITONIN - Normal    Procalcitonin 0.39     BLOOD CULTURE   URINE CULTURE   BLOOD CULTURE   Personall fentanyl we cannot get the leftovers constantly though and I will I now        Emergency Department Course & Assessments:    Interventions:  Medications   sodium chloride 0.9% BOLUS 1,000 mL (has no administration in time range)   acetaminophen (TYLENOL) tablet 1,000 mg (has no administration in time range)        Assessments:  2111 I obtained history and examined the patient as noted above  2300 I rechecked the patient and explained findings.    Independent Interpretation (X-rays, CTs, rhythm strip):  None    Consultations/Discussion of Management or Tests:  2315 I spoke with Dr. Gooden, hospitalist, who will admit my patient       Social Determinants of Health affecting care:  None     Disposition:  The patient was admitted to the hospital under the care of Dr. Gooden.    Impression & Plan         Medical Decision Making:  Patient is a 73-year-old female presenting after reported recent fall as well as with altered mental status.  She is febrile on arrival though without focal neurodeficits or signs of trauma.  She did undergo formal CT head which is without acute process.   Remainder of trauma exam is unremarkable. Workup does show concerns for UTI today.  Blood cultures have been sent as well as urine culture.  She was given a dose of IV Rocephin.  No evidence to suggest severe sepsis.  EKG without ischemic changes.  Mild troponin elevation though likely secondary to CPK elevation.  IV fluids provided.  Patient remained otherwise hemodynamically stable, accepted by hospitalist for admission.  Strong suspicion that her encephalopathy is more secondary to UTI.   confirmed patient is a DNR/DNI.     Diagnosis:    ICD-10-CM    1. Urinary tract infection with hematuria, site unspecified  N39.0     R31.9       2. Elevated CPK  R74.8       3. Elevated troponin  R79.89       4. Encephalopathy  G93.40       5. Fall, initial encounter  W19.XXXA            Discharge Medications:  New Prescriptions    No medications on file          Scribe Disclosure:  I, Allen Sinclair, am serving as a scribe at 9:13 PM on 4/21/2024 to document services personally performed by Erin Peck DO based on my observations and the provider's statements to me.  4/21/2024   Erin Peck DO McDonald, Lindsey E, DO  04/22/24 0028

## 2024-04-22 NOTE — PLAN OF CARE
"[facility]    ED Boarding Nurse Handoff Addendum Report:    Date/time: 4/22/2024, 6:17 AM    Activity Level: in bed    Fall Risk: Yes:  room door open    Active Infusions:  ml/hr    Current care needs: bladder retention, abx, increased strength    Oxygen requirements (liters/min and/or FiO2): 2L    Respiratory status: Nasal cannula    Vital signs (within last 30 minutes):    Vitals:    04/21/24 2245 04/21/24 2300 04/22/24 0000 04/22/24 0403   BP:   106/72 99/68   BP Location:   Left arm Left arm   Pulse:   88 69   Resp:   18 18   Temp:   98.8  F (37.1  C) 97.9  F (36.6  C)   TempSrc:   Oral Oral   SpO2: 99% 97% 96% 99%       Focused assessment within last 30 minutes:    NAN[employer] orientation status d/t patient mental status and being obtunded. Pt did wake once and respond to question if she had to urinate to which she replied, \"no not yet.\" Pt bladder scanned around 0530 for 350 ml.     ED Boarding Nurse name: Cheko Patterson RN   "

## 2024-04-23 NOTE — PROGRESS NOTES
Care Management Discharge Note    Discharge Date: 04/23/2024    Discharge Disposition: Home    Discharge Services: None    Discharge DME: Walker    Discharge Transportation: spouse Marcellus    Private pay costs discussed: Not applicable    Patient/family educated on Medicare website which has current facility and service quality ratings:  yes    Education Provided on the Discharge Plan:  yes  Persons Notified of Discharge Plans: patient, spouse, bedside RN  Patient/Family in Agreement with the Plan: yes      Additional Information:  Patient is medically ready for discharge today to home with spouse. Met with patient and spouse Marcellus at bedside to discuss discharge. Marcellus feels ok with taking patient back to home and caring for her. He states patient is doing better now mentally then before hospitalization. Spouse is interested in resources for eventual long term care / memory care placement. Currently, Marcellus provided patient with 24/7 care in home.     Writer provided patient with resources as requested. Spouse plans on obtaining a  to help with retaining assets. Spouse is also interested in home care nursing, PT, OT, and SW to assist with community resources Home Care orders have been entered and referral sent to [facility] to review and outsource if necessary. Pt will also discharge with a new walker. No further care management needs noted.     ADDENDUM 1508: [facility] has accepted patient referral for home RN PT OT and SW. Called and updated spouse Marcellus with agency name and contact info.         Mandy Chávez, RN  Care Coordinator  [facility]  [phone]

## 2024-04-23 NOTE — PLAN OF CARE
"Goal Outcome Evaluation:        Overall Patient Progress: improving     Outcome Evaluation: Afebril , VSS except SBP Between 140'S and 170's .    Alert but only oriented to self . Intermittent confusion and restless. Denied pain or discomfort. No short of breath. Abx : cefTRIAXone .       Problem: Adult Inpatient Plan of Care  Goal: Plan of Care Review  Description: The Plan of Care Review/Shift note should be completed every shift.  The Outcome Evaluation is a brief statement about your assessment that the patient is improving, declining, or no change.  This information will be displayed automatically on your shift  note.  Outcome: Progressing  Flowsheets (Taken 4/23/2024 0543)  Outcome Evaluation: Afebril , VSS except SBP Between 140'S and 170's .  Overall Patient Progress: improving  Goal: Patient-Specific Goal (Individualized)  Description: You can add care plan individualizations to a care plan. Examples of Individualization might be:  \"Parent requests to be called daily at 9am for status\", \"I have a hard time hearing out of my right ear\", or \"Do not touch me to wake me up as it startles  me\".  Outcome: Progressing  Goal: Absence of Hospital-Acquired Illness or Injury  Outcome: Met  Intervention: Identify and Manage Fall Risk  Recent Flowsheet Documentation  Taken 4/23/2024 0102 by Chano Fajardo, RN  Safety Promotion/Fall Prevention: safety round/check completed  Taken 4/22/2024 2118 by Chano Fajardo, RN  Safety Promotion/Fall Prevention: safety round/check completed  Taken 4/22/2024 1935 by Chano Fajardo, RN  Safety Promotion/Fall Prevention:   activity supervised   assistive device/personal items within reach   clutter free environment maintained   nonskid shoes/slippers when out of bed   safety round/check completed   room near nurse's station   mobility aid in reach   lighting adjusted   increase visualization of patient   patient and family education   supervised activity  Intervention: Prevent Skin " Injury  Recent Flowsheet Documentation  Taken 4/22/2024 1935 by Chano Fajardo, RN  Body Position: position changed independently  Intervention: Prevent Infection  Recent Flowsheet Documentation  Taken 4/22/2024 1935 by Chano Fajardo, RN  Infection Prevention:   rest/sleep promoted   single patient room provided   hand hygiene promoted   cohorting utilized  Goal: Optimal Comfort and Wellbeing  Outcome: Met  Goal: Readiness for Transition of Care  Outcome: Progressing

## 2024-04-23 NOTE — PLAN OF CARE
"BP (!) 153/83 (BP Location: Left arm)   Pulse 73   Temp 98.3  F (36.8  C) (Oral)   Resp 14   Wt 58.6 kg (129 lb 1.6 oz)   SpO2 98%   BMI 20.22 kg/m      Neuro: alert to self  Cardiac: WNL  Lungs: WNL  GI: WNL  : incontinent at times  Pain: denies  IV: NS @125  Meds: rocephin  Diet: reg  Activity: assist x1/gb/walker  Misc: q4 vitals. PT/SW following.   Plan: Currently resting in bed.         Problem: Adult Inpatient Plan of Care  Goal: Plan of Care Review  Description: The Plan of Care Review/Shift note should be completed every shift.  The Outcome Evaluation is a brief statement about your assessment that the patient is improving, declining, or no change.  This information will be displayed automatically on your shift  note.  Outcome: Progressing  Flowsheets (Taken 4/22/2024 1953)  Outcome Evaluation: continue antibiotics. PT/SW following.  Plan of Care Reviewed With:   patient   spouse  Overall Patient Progress: improving  Goal: Patient-Specific Goal (Individualized)  Description: You can add care plan individualizations to a care plan. Examples of Individualization might be:  \"Parent requests to be called daily at 9am for status\", \"I have a hard time hearing out of my right ear\", or \"Do not touch me to wake me up as it startles  me\".  Outcome: Progressing  Goal: Absence of Hospital-Acquired Illness or Injury  Outcome: Progressing  Intervention: Identify and Manage Fall Risk  Recent Flowsheet Documentation  Taken 4/22/2024 8822 by Madhuri Mcgee RN  Safety Promotion/Fall Prevention:   activity supervised   assistive device/personal items within reach   clutter free environment maintained   nonskid shoes/slippers when out of bed   safety round/check completed  Intervention: Prevent Skin Injury  Recent Flowsheet Documentation  Taken 4/22/2024 145 by Madhuri Mcgee RN  Body Position: position changed independently  Goal: Optimal Comfort and Wellbeing  Outcome: Progressing  Goal: Readiness for Transition of " Care  Outcome: Progressing  Intervention: Mutually Develop Transition Plan  Recent Flowsheet Documentation  Taken 4/22/2024 1627 by Madhuri Mcgee RN  Equipment Currently Used at Home: none     Problem: UTI (Urinary Tract Infection)  Goal: Improved Infection Symptoms  Outcome: Progressing     Problem: Delirium  Goal: Optimal Coping  Outcome: Progressing  Goal: Improved Behavioral Control  Outcome: Progressing  Goal: Improved Attention and Thought Clarity  Outcome: Progressing  Goal: Improved Sleep  Outcome: Progressing     Problem: Fall Injury Risk  Goal: Absence of Fall and Fall-Related Injury  Outcome: Progressing  Intervention: Identify and Manage Contributors  Recent Flowsheet Documentation  Taken 4/22/2024 1458 by Madhuri Mcgee RN  Medication Review/Management: medications reviewed  Intervention: Promote Injury-Free Environment  Recent Flowsheet Documentation  Taken 4/22/2024 1458 by Madhuri Mcgee RN  Safety Promotion/Fall Prevention:   activity supervised   assistive device/personal items within reach   clutter free environment maintained   nonskid shoes/slippers when out of bed   safety round/check completed     Problem: Comorbidity Management  Goal: Maintenance of Asthma Control  Outcome: Progressing  Intervention: Maintain Asthma Symptom Control  Recent Flowsheet Documentation  Taken 4/22/2024 1458 by Madhuri Mcgee RN  Medication Review/Management: medications reviewed  Goal: Maintenance of Behavioral Health Symptom Control  Outcome: Progressing  Intervention: Maintain Behavioral Health Symptom Control  Recent Flowsheet Documentation  Taken 4/22/2024 1458 by Madhuri Mcgee RN  Medication Review/Management: medications reviewed  Goal: Maintenance of COPD Symptom Control  Outcome: Progressing  Intervention: Maintain COPD Symptom Control  Recent Flowsheet Documentation  Taken 4/22/2024 1458 by Madhuri Mcgee RN  Medication Review/Management: medications reviewed  Goal: Blood Glucose Levels Within  Targeted Range  Outcome: Progressing  Intervention: Monitor and Manage Glycemia  Recent Flowsheet Documentation  Taken 4/22/2024 1458 by Madhuri Mcgee RN  Medication Review/Management: medications reviewed  Goal: Maintenance of Heart Failure Symptom Control  Outcome: Progressing  Intervention: Maintain Heart Failure Management  Recent Flowsheet Documentation  Taken 4/22/2024 1458 by Madhuri Mcgee RN  Medication Review/Management: medications reviewed  Goal: Blood Pressure in Desired Range  Outcome: Progressing  Intervention: Maintain Blood Pressure Management  Recent Flowsheet Documentation  Taken 4/22/2024 1458 by Madhuri Mcgee RN  Medication Review/Management: medications reviewed  Goal: Maintenance of Osteoarthritis Symptom Control  Outcome: Progressing  Intervention: Maintain Osteoarthritis Symptom Control  Recent Flowsheet Documentation  Taken 4/22/2024 1458 by Madhuri Mcgee RN  Assistive Device Utilized:   walker   gait belt  Activity Management: activity adjusted per tolerance  Medication Review/Management: medications reviewed  Taken 4/22/2024 1330 by Madhuri Mcgee RN  Assistive Device Utilized: gait belt  Activity Management: ambulated to bathroom  Goal: Bariatric Home Regimen Maintained  Outcome: Progressing  Intervention: Maintain and Manage Postbariatric Surgery Care  Recent Flowsheet Documentation  Taken 4/22/2024 1458 by Madhuri Mcgee RN  Medication Review/Management: medications reviewed  Goal: Maintenance of Seizure Control  Outcome: Progressing  Intervention: Maintain Seizure Symptom Control  Recent Flowsheet Documentation  Taken 4/22/2024 1458 by Madhuri Mcgee RN  Medication Review/Management: medications reviewed       Goal Outcome Evaluation:      Plan of Care Reviewed With: patient, spouse    Overall Patient Progress: improvingOverall Patient Progress: improving    Outcome Evaluation: continue antibiotics. PT/SW following.

## 2024-04-23 NOTE — PLAN OF CARE
"Goal Outcome Evaluation:      Plan of Care Reviewed With: patient    Overall Patient Progress: improvingOverall Patient Progress: improving    Outcome Evaluation: Plan to discharge home this afternoon in care of . Afebrile. Will discharge home on oral abx. Up with GB/W. Confused at baseline. Personal belongings and call light in reach. Bed alarm on and falls bundle present. For VS and assessment, please see documentation under flowsheets. Discussed discharge with , Surjit. He agrees with discharge and follow up plans. Discharged with walker.    Problem: Adult Inpatient Plan of Care  Goal: Plan of Care Review  Description: The Plan of Care Review/Shift note should be completed every shift.  The Outcome Evaluation is a brief statement about your assessment that the patient is improving, declining, or no change.  This information will be displayed automatically on your shift  note.  Outcome: Progressing  Flowsheets (Taken 4/23/2024 1209)  Outcome Evaluation: Plan to discharge home this afternoon in care of . Afebrile. Will discharge home on oral abx. Up with GB/W. Confused at baseline.  Plan of Care Reviewed With: patient  Overall Patient Progress: improving  Goal: Patient-Specific Goal (Individualized)  Description: You can add care plan individualizations to a care plan. Examples of Individualization might be:  \"Parent requests to be called daily at 9am for status\", \"I have a hard time hearing out of my right ear\", or \"Do not touch me to wake me up as it startles  me\".  Outcome: Progressing  Goal: Readiness for Transition of Care  Outcome: Progressing     Problem: UTI (Urinary Tract Infection)  Goal: Improved Infection Symptoms  Outcome: Progressing  Intervention: Prevent Infection Progression  Recent Flowsheet Documentation  Taken 4/23/2024 1000 by Mckenna Rojo RN  Infection Management: aseptic technique maintained  Intervention: Facilitate Optimal Urinary Elimination  Recent Flowsheet " Documentation  Taken 4/23/2024 1000 by Mckenna Rojo RN  Urinary Elimination Promotion:   absorbent pad/diaper use encouraged   toileting offered     Problem: Delirium  Goal: Optimal Coping  Outcome: Progressing  Intervention: Optimize Psychosocial Adjustment to Delirium  Recent Flowsheet Documentation  Taken 4/23/2024 1000 by Mckenna Rjoo RN  Supportive Measures:   active listening utilized   positive reinforcement provided   self-care encouraged  Goal: Improved Behavioral Control  Outcome: Progressing  Intervention: Minimize Safety Risk  Recent Flowsheet Documentation  Taken 4/23/2024 1000 by Mckenna Rojo RN  Communication Enhancement Strategies:   call light answered in person   extra time allowed for response  Enhanced Safety Measures:   room near unit station   review medications for side effects with activity  Goal: Improved Attention and Thought Clarity  Outcome: Progressing  Intervention: Maximize Cognitive Function  Recent Flowsheet Documentation  Taken 4/23/2024 1000 by Mckenna Rojo RN  Reorientation Measures: clock in view  Goal: Improved Sleep  Outcome: Progressing     Problem: Fall Injury Risk  Goal: Absence of Fall and Fall-Related Injury  Outcome: Progressing  Intervention: Identify and Manage Contributors  Recent Flowsheet Documentation  Taken 4/23/2024 1000 by Mckenna Rojo RN  Medication Review/Management: medications reviewed  Intervention: Promote Injury-Free Environment  Recent Flowsheet Documentation  Taken 4/23/2024 1000 by Mckenna Rojo RN  Safety Promotion/Fall Prevention: safety round/check completed     Problem: Comorbidity Management  Goal: Maintenance of Asthma Control  Outcome: Progressing  Intervention: Maintain Asthma Symptom Control  Recent Flowsheet Documentation  Taken 4/23/2024 1000 by Mckenna Rojo RN  Medication Review/Management: medications reviewed  Goal: Maintenance of Behavioral Health Symptom Control  Outcome: Progressing  Intervention: Maintain  Behavioral Health Symptom Control  Recent Flowsheet Documentation  Taken 4/23/2024 1000 by Mckenna Rojo RN  Medication Review/Management: medications reviewed  Goal: Maintenance of COPD Symptom Control  Outcome: Progressing  Intervention: Maintain COPD Symptom Control  Recent Flowsheet Documentation  Taken 4/23/2024 1000 by Mckenna Rojo RN  Supportive Measures:   active listening utilized   positive reinforcement provided   self-care encouraged  Medication Review/Management: medications reviewed  Goal: Blood Glucose Levels Within Targeted Range  Outcome: Progressing  Intervention: Monitor and Manage Glycemia  Recent Flowsheet Documentation  Taken 4/23/2024 1000 by Mckenna Rojo RN  Medication Review/Management: medications reviewed  Goal: Maintenance of Heart Failure Symptom Control  Outcome: Progressing  Intervention: Maintain Heart Failure Management  Recent Flowsheet Documentation  Taken 4/23/2024 1000 by Mckenna Rojo RN  Medication Review/Management: medications reviewed  Goal: Blood Pressure in Desired Range  Outcome: Progressing  Intervention: Maintain Blood Pressure Management  Recent Flowsheet Documentation  Taken 4/23/2024 1000 by Mckenna Rojo RN  Medication Review/Management: medications reviewed  Goal: Maintenance of Osteoarthritis Symptom Control  Outcome: Progressing  Intervention: Maintain Osteoarthritis Symptom Control  Recent Flowsheet Documentation  Taken 4/23/2024 1000 by Mckenna Rojo RN  Assistive Device Utilized:   gait belt   walker  Activity Management:   ambulated outside room   ambulated in room   ambulated to bathroom   up in chair  Medication Review/Management: medications reviewed  Goal: Bariatric Home Regimen Maintained  Outcome: Progressing  Intervention: Maintain and Manage Postbariatric Surgery Care  Recent Flowsheet Documentation  Taken 4/23/2024 1000 by Mckenna Rojo RN  Medication Review/Management: medications reviewed  Goal: Maintenance of Seizure  Control  Outcome: Progressing  Intervention: Maintain Seizure Symptom Control  Recent Flowsheet Documentation  Taken 4/23/2024 1000 by Mckenna Rojo RN  Medication Review/Management: medications reviewed

## 2024-04-23 NOTE — DISCHARGE SUMMARY
[facility]  Hospitalist Discharge Summary      Date of Admission:  4/21/2024  Date of Discharge:  4/23/2024  Discharging Provider: Jarrod Coronel MD  Discharge Service: Hospitalist Service  Primary Care Physician   Noe Kowalski    Discharge Diagnoses   E. coli UTI with bacteremia  Acute infectious encephalopathy  Lewy body dementia  Polypharmacy  Acute prerenal renal failure on chronic kidney disease stage II-3  Elevated CK  Elevated troponin  Hypothyroidism  History of L1 burst fracture    Hospital Course       Elsa Jackson is a 73 year old female with PMH including Lewy body dementia, CKD stage II-III, hypothyroidism, and L1 burst fracture who presents with confusion and lethargy found to have a UTI and bacteremia.  Patient is not able to provide  any meaningful history as she was lethargic on admission and will not answer  questions when woken up.  Info was gotten from her spouse whom she lives with.  For the last 3 days or so prior to admission, she has been very lethargic and sleeping most of the time.  When he does talk to her she rarely responds which is abnormal for her.  She normally can hold a conversation.  He is not sure if she had a fever as they have not checked a temperature.  She has not been reporting any new symptoms or pain.  She was not having any vomiting.  She has not been eating and drinking much though and has been losing weight.    She did fall out of bed 6 days prior to admission and they were seen in urgent care.  She had a bump on the back of the head with a small amount of blood.  She does not on anticoagulation.     History obtained from patient's spouse secondary to her acute encephalopathy, medical record, and from Dr. Peck in the emergency department.  Blood pressure 154/92, heart rate 97, temperature 100.5  F, oxygen 90% on room air.  Noncontrast head CT unremarkable for any acute pathology.  Chest x-ray negative for infiltrate.  WBC 8.7, hemoglobin  11.8, platelet count 232.  Creatinine 1.1 and BUN 36 otherwise BMP unremarkable.  CK elevated at 408.  VBG shows pH 7.45, pCO2 42, pO2 24, bicarb 29.  LFTs within normal limits.  Lactic acid is 0.6.  Troponin T is slightly elevated at 20.  COVID-19, influenza A/B, RSV PCR is negative.  Procalcitonin 0.39.  EKG shows NSR with no ST elevation or depression.  Urinalysis consistent with UTI with >182 WBCs, large LE.  She received 1 g IV ceftriaxone, acetaminophen, and 1 L NS.  Admit inpatient due to ongoing encephalopathic state.  Anticipate 2 night hospitalization.          UTI, gram negative bacteremia duet E coli:   Patient has been very lethargic and confused for the last 3 days prior to admission.  Has not had any fevers although they have not checked a temperature at home.  Not reporting any new symptoms although is not really saying much to her spouse.  She cannot provide any history.  In the ER blood pressure is slightly elevated, heart rate 97, temperature 100.5  F.  WBC count was 8.7, CK elevated at 408, lactic acid 0.6, LFTs normal, COVID-19, influenza A/B, RSV PCR is negative, chest x-ray does not show infiltrate, and urinalysis is grossly abnormal consistent with UTI with >182 WBCs and large LE.. she was placed on Continue 1 g IV ceftriaxone every 24 hours.  Blood culture has come back with gram negative bacilli with E coli with sensitivities pending. Urine culture pending. Verigene showed E coli.  Patient's mentation improved drastically quickly with IV antibiotics.  Her Rocephin was changed to oral Omnicef and she will finish a week worth of antibiotics.  Patient returned to her baseline will return home with her .            Acute infectious encephalopathy, Lewy body dementia, polypharmacy, recent fall:   She has Lewy body dementia and follows with neurology as recently as 2 months ago and OT at that time where she scored in the moderate to severe dementia range on the MOCA exam.  She takes  Aricept and Effexor.  She was lethargic with her infection as above. Per spouse, at baseline, the patient is able to hold a conversation.  Lives at home with spouse.  She has not been eating much and losing weight as of late.  She has been very lethargic sleeping most of the day and confused for the last 3 days prior to admission.  He asked her questions and she will not really respond and fall asleep easily.  She did have a fall 6 days ago from bed and hit the back of her head which had a small amount of blood.  She was seen at urgent care for this.  CT head unremarkable.  BMP fairly unremarkable.  She is not hypercapnic.  UA is consistent with UTI as a source of her acute encephalopathy.  Also appears to be mildly dehydrated.  Patient treated with antibiotics and IV antibiotics.  Her home aricept and effexor could be causing side effects and playing a role in her somnolence.  We did hold both as polypharmacy could be a contributing factor though her symptoms improved quite quickly with antibiotics.. Per neurology note the patient and spouse took her off Seroquel as they states she had a seizure on this medication so if there are agitation issues maybe try Haldol instead.  Patient is at high risk for delirium and behavioral issues.  She has pneumoboots on and has an IV in place and any tethers could be a contributing factor to her agitation going forward.  Will resume her home aricept and effexor at discharge.  Given her falls and mentation she may benefit from a long term memory care unit sooner than later.  She will go home with home care for physical therapy, occupational therapy, social work and nursing.      Acute prerenal renal failure on chronic kidney disease stage II-III, Elevated CK:   Creatinine baseline appears to be about 1-1.1 although it was as low as 0.8 last year.  CK mildly elevated at 408 on admission.  She has not been eating or drinking much lately.  She has a UTI as above appears mildly  dehydrated on exam. She was given fluids via IV.  Her creatinine improved down to 0.97 with her CK coming down.           Elevated troponin:   Troponin T slightly elevated at 20.  She cannot provide any history regarding chest pain.  EKG does not show ischemic change.  Here with UTI encephalopathy and I would doubt cardiac etiology for this presentation.  A repeat troponin is stable.  No plans for further workup       Hypothyroidism:   Resume PTA levothyroxine.     History L1 burst fracture:   History notee, This was in 2023.  She does have a history of recent falling.            4Ms:  Polypharmacy: Medications reviewed.  Given her lethargy her home Effexor and Aricept could be playing a role and need to be really concerned about resuming these.  In the setting of her infection however that could be the cause as well.  Holding these medications for now.  Mentation:   Capacity patient does not have full capacity make her own medical decisions.  She depends on her  whom she lives with to help with this.  Social support: Lives at home with her   Mobility: Unknown  What is importmant: To be at home with her     Clinically Significant Risk Factors          Significant Results and Procedures   Most Recent 3 CBC's:  Recent Labs   Lab Test 04/21/24  2107 01/10/23  1155   WBC 8.7 8.8   HGB 11.8 12.2   MCV 96 99    244     Most Recent 3 BMP's:  Recent Labs   Lab Test 04/23/24  0700 04/23/24  0117 04/22/24  0827 04/21/24  2107     --  143 140   POTASSIUM 3.8  --  4.1 4.4   CHLORIDE 106  --  110* 103   CO2 21*  --  23 27   BUN 19.7  --  29.2* 36.1*   CR 0.97*  --  1.19* 1.10*   ANIONGAP 11  --  10 10   MYNOR 8.7*  --  8.5* 9.8   GLC 81 87 93 119*   ,   Results for orders placed or performed during the hospital encounter of 04/21/24   Head CT w/o contrast    Narrative    EXAM: CT HEAD W/O CONTRAST  LOCATION: [facility]  DATE: 4/21/2024    INDICATION: Altered mental  status  COMPARISON:  CT head 01/10/2023.  TECHNIQUE: Routine CT Head without IV contrast. Multiplanar reformats. Dose reduction techniques were used.    FINDINGS:  INTRACRANIAL CONTENTS: No intracranial hemorrhage, extraaxial collection, or mass effect.  No CT evidence of acute infarct. Moderate presumed chronic small vessel ischemic changes. Mild to moderate generalized volume loss. No hydrocephalus.     VISUALIZED ORBITS/SINUSES/MASTOIDS: No intraorbital abnormality. No significant paranasal sinus mucosal disease. No middle ear or mastoid effusion.    BONES/SOFT TISSUES: No acute abnormality.      Impression    IMPRESSION:  1.  No acute intracranial process or significant change since 01/10/2023.   XR Chest 2 Views    Narrative    EXAM: XR CHEST 2 VIEWS  LOCATION: [facility]  DATE: 4/21/2024    INDICATION: fall; sepsis  COMPARISON: None.      Impression    IMPRESSION: Negative chest.            Follow up/instructions: Patient to follow-up with her primary care provider in a week being sure that her urinary tract infection has resolved and making sure that she is back to her baseline.  Need to consider polypharmacy with her Effexor and Aricept if her mentation continues to be depressed.    Pending test results at discharge:      Unresulted Labs Ordered in the Past 30 Days of this Admission       Date and Time Order Name Status Description    4/21/2024  9:32 PM Blood Culture Arm, Left Preliminary     4/21/2024  9:10 PM Urine Culture Preliminary     4/21/2024  9:10 PM Blood Culture Peripheral Blood Preliminary              Discharge Orders      Reason for your hospital stay    UTI     Follow-up and recommended labs and tests     Follow up with primary care provider, Noe Kowalski, within 7 days for hospital follow- up.  No follow up labs or test are needed. Follow up UTI due to e coli and polypharmacy     Activity    Your activity upon discharge: activity as tolerated     Diet    Follow this  diet upon discharge: Orders Placed This Encounter      Combination Diet Regular Diet Adult       Discharge Disposition   Discharged to home  Condition at discharge: Stable      Consultations This Hospital Stay   PHYSICAL THERAPY ADULT IP CONSULT  CARE MANAGEMENT / SOCIAL WORK IP CONSULT    Code Status   No CPR- Pre-arrest intubation OK    Time Spent on this Encounter   I, Jarrod Coronel MD, personally saw the patient today and spent greater than 30 minutes discharging this patient.  Discussed with nursing, social work, case management.       This document was created using voice recognition technology.  Please excuse any typographical errors that may have occurred.  Please call with any questions.       Jarrod Coronel MD  [place] [address] [facility]  [address] [place] [address]  Phone: [phone]  Fax: [phone]  ______________________________________________________________________    Physical Exam   Vital Signs: Temp: 99.1  F (37.3  C) Temp src: Oral BP: (!) 142/81 Pulse: 64   Resp: 16 SpO2: 97 % O2 Device: None (Room air) Oxygen Delivery: 1 LPM  Weight: 129 lbs 1.6 oz    Exam is actually improved  General appearance: Patient is much more awake conversing with me sitting up on the side of the bed, her mentation is much improved from admission.  no apparent distress, pleasant and conversing normally, speaking in full sentences, appears elderly and frail.  Looks much older than stated age  HEENT:   Mucous membranes are moist  RESPIRATORY: Clear to auscultation bilateral, good air movement  CARDIOVASCULAR: Regular rate and rhythm  GASTROINTESTINAL: Non-distended, thin, non-tender, soft, bowel sounds present throughout, no pelvic tenderness  NEUROLOGIC: Patient is pleasantly confused but no focal deficits   EXTREMITIES:  Moves all extremities, no clubbing, cyanosis, nor edema  :  Borjas not present            Discharge Medications   Current Discharge Medication List         START taking these medications    Details   cefdinir (OMNICEF) 300 MG capsule Take 1 capsule (300 mg) by mouth every 12 hours for 5 days  Qty: 10 capsule, Refills: 0    Associated Diagnoses: Urinary tract infection with hematuria, site unspecified           CONTINUE these medications which have NOT CHANGED    Details   acetaminophen (TYLENOL) 500 MG tablet Take 1-2 tablets (500-1,000 mg) by mouth every 6 hours as needed for mild pain  Refills: 0    Associated Diagnoses: Closed unstable burst fracture of first lumbar vertebra, initial encounter (H); Fall, initial encounter      donepezil (ARICEPT) 5 MG tablet Take 5 mg by mouth at bedtime      levothyroxine (SYNTHROID/LEVOTHROID) 112 MCG tablet Take 112 mcg by mouth daily      venlafaxine (EFFEXOR XR) 37.5 MG 24 hr capsule Take 1 capsule by mouth daily           Allergies   No Known Allergies

## 2024-04-24 NOTE — PLAN OF CARE
"Physical Therapy Discharge Summary    Reason for therapy discharge:    Discharged to home.    Progress towards therapy goal(s). See goals on Care Plan in [facility] electronic health record for goal details.  Goals not met.  Barriers to achieving goals:   discharge from facility.    Therapy recommendation(s):    Continued therapy is recommended.  Rationale/Recommendations:  Per prior PT recommendation, \"Pt at baseline lives in house with spouse, ambulates w/o AD, but needing help with ADLs and IADLs due to confusion related to PMH of lewy body dementia. Pt is currently below baseline, deficits in balance primarily and with increased confusion. Recommending FWW use for ambulation at this time. Will reassess during IP PT. Pt will likely require increased level of assistance in long-term given confusion (spouse reports pt leaving home in middle of the night) and medical needs, memory care or other LTC would be beneficial. If spouse able to provide Ax1, pt from mobility standpoint is safe to return home otherwise\".      "

## 2024-04-25 NOTE — PROGRESS NOTES
Clinic Care Coordination Contact  [facility]/Voicemail       Clinical Data: Care Coordinator Outreach  Outreach attempted x 2.  Left message on patient's voicemail with call back information and requested return call.  Plan:  Care Coordinator will do no further outreaches at this time.    Kendra TRAVIS Community Health Worker  Clinic Care Coordination  [facility]  Phone: [phone]

## 2024-08-17 NOTE — ED PROVIDER NOTES
Emergency Department Note      History of Present Illness     Chief Complaint   Altered Mental Status      HPI   Elsa Jackson is a 74 year old female with history of Lewy body dementia, encephalopathy, stage 2 CKD, UTI, and hypothyroidism who presents to the ED via EMS with her  for evaluation of altered mental status. Patient wandered out of the house earlier today while her  was napping. Per nurse present, Elsa has a habit of wandering but has never wandered this far before. PD [place] side [place] the [place].  notes similar events of wandering have happened if he drifts off to sleep. Patient fell the other day right outside the house, unwitnessed. She also fell at an appointment a couple weeks ago and was evaluated on scene but no imaging.  reports Elsa was anxious this morning but otherwise acting at baseline. No fever, cough, vomiting, diarrhea, or recent illness. Denies recent medication changes. Patient is not on blood thinners.  does not have help at home and feels the living situation is safe. Patient does hallucinate and interact with the hallucinations.       History limited due to patient's baseline dementia.     Independent Historian    as detailed above.    Review of External Notes   I reviewed the urgent care visit from 7/25/2024 when she last had a fall.  I reviewed the neurology office visit from the same day she has been evaluated for her dementia.    Past Medical History     Medical History and Problem List   Stage 2 CKD  Hypothyroidism  Lewy body dementia   Encephalopathy   UTI   Colon polyp  Osteoporosis   Synovial cyst of lumbar facet joint   Carpal tunnel syndrome   Osteoarthritis   Dorsopathy     Medications   Donepezil  Levothyroxine  Venlafaxine   Bisacodyl  Ondansetron   Olanzapine     Physical Exam     Patient Vitals for the past 24 hrs:   BP Temp Temp src Pulse Resp SpO2   08/17/24 2045 109/74 -- -- 79 18 98 %   08/17/24 1908 -- 98  F  (36.7  C) Temporal -- -- --   08/17/24 1732 111/74 -- -- 86 16 97 %     Physical Exam  Vitals and nursing note reviewed.   Constitutional:       General: She is not in acute distress.     Appearance: She is not ill-appearing.   HENT:      Head: Normocephalic and atraumatic.      Right Ear: External ear normal.      Left Ear: External ear normal.      Nose: Nose normal.      Mouth/Throat:      Mouth: Mucous membranes are moist.   Eyes:      Extraocular Movements: Extraocular movements intact.      Conjunctiva/sclera: Conjunctivae normal.   Cardiovascular:      Rate and Rhythm: Normal rate and regular rhythm.      Heart sounds: No murmur heard.  Pulmonary:      Effort: Pulmonary effort is normal. No respiratory distress.      Breath sounds: Normal breath sounds. No wheezing, rhonchi or rales.   Abdominal:      General: Abdomen is flat. Bowel sounds are normal. There is no distension.      Palpations: Abdomen is soft.      Tenderness: There is no abdominal tenderness. There is no guarding or rebound.   Musculoskeletal:         General: No deformity or signs of injury.      Cervical back: Normal range of motion and neck supple.   Skin:     General: Skin is warm and dry.      Findings: No rash.   Neurological:      Mental Status: She is alert. Mental status is at baseline. She is confused.      Cranial Nerves: No cranial nerve deficit.      Sensory: Sensation is intact.      Motor: No weakness.   Psychiatric:         Behavior: Behavior normal.         Cognition and Memory: Cognition is impaired. Memory is impaired.           Diagnostics     Lab Results   Labs Ordered and Resulted from Time of ED Arrival to Time of ED Departure   ROUTINE UA WITH MICROSCOPIC REFLEX TO CULTURE - Abnormal       Result Value    Color Urine Yellow      Appearance Urine Slightly Cloudy (*)     Glucose Urine Negative      Bilirubin Urine Negative      Ketones Urine Negative      Specific Gravity Urine 1.027      Blood Urine Negative      pH  Urine 6.0      Protein Albumin Urine 20 (*)     Urobilinogen Urine Normal      Nitrite Urine Negative      Leukocyte Esterase Urine Moderate (*)     Mucus Urine Present (*)     RBC Urine 16 (*)     WBC Urine 54 (*)     Hyaline Casts Urine 5 (*)    URINE CULTURE       Imaging   CT Head w/o Contrast   Final Result   IMPRESSION:   1.  No CT evidence for acute intracranial process.   2.  Brain atrophy and presumed chronic microvascular ischemic changes as above.            Independent Interpretation   CT Head: No intracranial hemorrhage.    ED Course      Medications Administered   Medications   cephALEXin (KEFLEX) capsule 500 mg (500 mg Oral $Given 8/17/24 2042)       Procedures   Procedures     Discussion of Management   None    ED Course   ED Course as of 08/17/24 2213   Sat Aug 17, 2024   1751 I obtained history and examined the patient as noted above.    2026 I rechecked the patient and explained findings.        Additional Documentation  Dementia     Medical Decision Making / Diagnosis     CMS Diagnoses: None    MIPS       None    MDM   Elsa Jackson is a 74 year old female who presents after being found wandering alongside the highway earlier today.  Patient has Lewy body dementia and has wandered in the past.   notes that he accidentally fell asleep and she left during that time.  He notes that she has been acting her baseline and has not been sick recently though did have a fall this morning that was unwitnessed.  I do not see any signs of trauma on the patient.  She is quite disoriented and confused but this seems to be her baseline with her dementia.  Given that she is at her baseline, we did do a CT of the head with the recent fall to ensure that she did not have any signs of significant intracranial injury.  This was negative.  We also checked a UA and she did appear to have signs of UTI so we will cover her with Keflex.   feels safe with taking the patient home at this time though he was  offered additional resources.  We discussed return precautions and recommended close follow-up with primary care to consider obtaining additional help at home or consideration of memory care facility.    Disposition   The patient was discharged.     Diagnosis     ICD-10-CM    1. Acute UTI  N39.0       2. Wandering behavior due to dementia (H)  F03.918     Z91.83            Discharge Medications   Discharge Medication List as of 8/17/2024  8:45 PM        START taking these medications    Details   cephALEXin (KEFLEX) 500 MG capsule Take 1 capsule (500 mg) by mouth 3 times daily for 5 days, Disp-15 capsule, R-0, E-Prescribe               Scribe Disclosure:  I, Yany Rahman, am serving as a scribe at 6:05 PM on 8/17/2024 to document services personally performed by Bowen Morris MD based on my observations and the provider's statements to me.        Bowen Morris MD  08/17/24 2442

## 2024-08-17 NOTE — ED TRIAGE NOTES
Pt presents via EMS for complaint of altered mental status. EMS reports [place] the [place] side [place] [address] Patient is oriented to self only. Appears confused about situation and intermittently referencing other people in the room who are not present. Denies complaints. ABC intact, Alert.     Triage Assessment (Adult)       Row Name 08/17/24 [id number]          Triage Assessment    Airway WDL WDL        Respiratory WDL    Respiratory WDL WDL        Skin Circulation/Temperature WDL    Skin Circulation/Temperature WDL WDL        Cardiac WDL    Cardiac WDL WDL        Peripheral/Neurovascular WDL    Peripheral Neurovascular WDL WDL        Cognitive/Neuro/Behavioral WDL    Cognitive/Neuro/Behavioral WDL WDL

## 2024-08-19 NOTE — TELEPHONE ENCOUNTER
Final urine culture report is negative.    Adult: Negative urine culture parameters per protocol: Any # urogenital merle, single or mixed     [facility] discharge antibiotic prescribed (If applicable): Cephalexin    Treatment recommendations per [facility] ED Lab Result Urine Culture protocol: Discontinue antibiotic.      Spoke with the patient's  Maykel. He is with the patient. She denies any urinary symptoms. No urinary symptoms in the ED. Patient will stop the antibiotic and follow up with her PCP.         LETTY NANCE RN

## 2024-08-30 PROBLEM — R41.82 ALTERED MENTAL STATUS, UNSPECIFIED ALTERED MENTAL STATUS TYPE: Status: ACTIVE | Noted: 2024-01-01

## 2024-08-30 PROBLEM — M48.54XA: Status: ACTIVE | Noted: 2024-01-01

## 2024-08-30 NOTE — ED NOTES
[facility]  ED Nurse Handoff Report    ED Chief complaint: Generalized Weakness and Altered Mental Status  . ED Diagnosis:   Final diagnoses:   None       Allergies: No Known Allergies    Code Status: DNR    Activity level - Baseline/Home:  standby.  Activity Level - Current:   assist of 1.   Lift room needed: No.   Bariatric: No   Needed: No   Isolation: No.   Infection: Not Applicable.     Respiratory status: Room air    Vital Signs (within 30 minutes):   Vitals:    08/30/24 1752 08/30/24 1757 08/30/24 1802 08/30/24 1807   BP:   (!) 179/107    Pulse: 58 61 64 64   Resp: 16 27     Temp:       SpO2:    98%       Cardiac Rhythm:  ,      Pain level:    Patient confused: Yes.   Patient Falls Risk: patient and family education.   Elimination Status: Has voided     Patient Report - Initial Complaint: Generalized weakness, Altered mental status.   Focused Assessment: history of hypothyroidism and Lewy body dementia who presents with a male  for generalized weakness. Male  reports that for the past two weeks, patient has had increased lethargy and is mumbling her speech. She uses a walker to ambulate and fell a couple of days ago. Patient reported lower abdominal pain earlier today. She was seen in the ED a couple of weeks ago for a UTI and was started on Cephalexin. She has finished the course. She had a PCP appointment a couple of days ago and her urine was negative for UTI. Male  says that the patient has been constipated but denies any fever, cough, rhinorrhea, congestion, vomiting, or sick contacts. At baseline, patient is incontinent of urine and wears depends at night.      Abnormal Results:   Labs Ordered and Resulted from Time of ED Arrival to Time of ED Departure   BASIC METABOLIC PANEL - Abnormal       Result Value    Sodium 137      Potassium 4.2      Chloride 101      Carbon Dioxide (CO2) 22      Anion Gap 14      Urea Nitrogen 31.0 (*)      Creatinine 1.26 (*)     GFR Estimate 44 (*)     Calcium 10.0      Glucose 108 (*)    CBC WITH PLATELETS AND DIFFERENTIAL - Abnormal    WBC Count 5.4      RBC Count 3.51 (*)     Hemoglobin 11.0 (*)     Hematocrit 33.5 (*)     MCV 95      MCH 31.3      MCHC 32.8      RDW 13.1      Platelet Count 174      % Neutrophils 79      % Lymphocytes 13      % Monocytes 6      % Eosinophils 0      % Basophils 0      % Immature Granulocytes 0      NRBCs per 100 WBC 0      Absolute Neutrophils 4.3      Absolute Lymphocytes 0.7 (*)     Absolute Monocytes 0.3      Absolute Eosinophils 0.0      Absolute Basophils 0.0      Absolute Immature Granulocytes 0.0      Absolute NRBCs 0.0     INR - Normal    INR 0.98     HEPATIC FUNCTION PANEL - Normal    Protein Total 8.0      Albumin 4.1      Bilirubin Total 1.1      Alkaline Phosphatase 85      AST 31      ALT 19      Bilirubin Direct 0.26     LACTIC ACID WHOLE BLOOD WITH 1X REPEAT IN 2 HR WHEN >2 - Normal    Lactic Acid, Initial 0.8     TSH WITH FREE T4 REFLEX - Normal    TSH 3.49     MAGNESIUM - Normal    Magnesium 2.0     INFLUENZA A/B, RSV, & SARS-COV2 PCR - Normal    Influenza A PCR Negative      Influenza B PCR Negative      RSV PCR Negative      SARS CoV2 PCR Negative     ROUTINE UA WITH MICROSCOPIC REFLEX TO CULTURE        CT Abdomen Pelvis w Contrast   Final Result   IMPRESSION:    1.  Acute appearing compression deformity of T11 with mild loss of   height. No definite retropulsed fragments.   2.  Progressive compression deformity of L1 with similar posterior   displacement of fracture fragments.   3.  Stable compression deformity of L3.   4.  No acute process within the abdomen and pelvis.      MADELYN MONTALVO MD            SYSTEM ID:  [id number]      CT Head w/o Contrast   Final Result   IMPRESSION:   No acute findings. Chronic changes as above.         MELISSA RIGGINS MD            SYSTEM ID:  [id number]          Treatments provided: See MAR  Family Comments: Bedside  OBS  brochure/video discussed/provided to patient:  N/A  ED Medications:   Medications   iopamidol (ISOVUE-370) solution 65 mL (65 mLs Intravenous $Given 8/30/24 1603)   sodium chloride 0.9 % bag 500mL for CT scan flush use (56 mLs Intravenous $Given 8/30/24 1604)       Drips infusing:  No  For the majority of the shift this patient was Green.   Interventions performed were n/a.    Sepsis treatment initiated: No    Cares/treatment/interventions/medications to be completed following ED care: n/a    ED Nurse Name: Dewey Mccain RN  6:18 PM    RECEIVING UNIT ED HANDOFF REVIEW    Above ED Nurse Handoff Report was reviewed: Yes  Reviewed by: Gwen Brown RN on August 31, 2024 at 12:07 PM   SHARRON Dodson called the ED to inform them the note was read: Yes

## 2024-08-30 NOTE — ED TRIAGE NOTES
Arrives with family for increased confusion, increased weakness and possible syncope. Per family pt has demintia that has progressively getting worse. Pt is having a hard time in triage staying away. Recently seen for UTI and finished abx. Pt has clean UA 2 days ago at clinic. Pt has had multiple unwitnessed falls at home.        Triage Assessment (Adult)       Row Name 08/30/24 1422          Triage Assessment    Airway WDL WDL        Respiratory WDL    Respiratory WDL WDL        Cardiac WDL    Cardiac WDL WDL

## 2024-08-30 NOTE — ED PROVIDER NOTES
Emergency Department Note      History of Present Illness     Chief Complaint   Generalized Weakness and Altered Mental Status    HPI   Elsa Jackson is a 74 year old female with history of hypothyroidism and Lewy body dementia who presents with a male  for generalized weakness. Male  reports that for the past two weeks, patient has had increased lethargy and is mumbling her speech. She uses a walker to ambulate and fell a couple of days ago. Patient reported lower abdominal pain earlier today. She was seen in the ED a couple of weeks ago for a UTI and was started on Cephalexin. She has finished the course. She had a PCP appointment a couple of days ago and her urine was negative for UTI. Male  says that the patient has been constipated but denies any fever, cough, rhinorrhea, congestion, vomiting, or sick contacts. At baseline, patient is incontinent of urine and wears depends at night.     Independent Historian   Male  as detailed above.    Review of External Notes   None     Past Medical History     Medical History and Problem List   Stage 2 CKD   Hypothyroidism  Lewy body dementia   Colon polyp  Osteoporosis     Medications   Aricept  Levothyroxine   Venlafaxine  Zyprexa    Physical Exam     Patient Vitals for the past 24 hrs:   BP Temp Pulse Resp SpO2   08/30/24 1426 93/80 98.3  F (36.8  C) 98 16 100 %     Physical Exam  Constitutional:       Appearance: She is well-developed.   HENT:      Right Ear: External ear normal.      Left Ear: External ear normal.      Mouth/Throat:      Mouth: Mucous membranes are moist.      Pharynx: Oropharynx is clear. No oropharyngeal exudate or posterior oropharyngeal erythema.   Eyes:      General: No scleral icterus.     Extraocular Movements: Extraocular movements intact.      Conjunctiva/sclera: Conjunctivae normal.      Pupils: Pupils are equal, round, and reactive to light.   Cardiovascular:      Rate and Rhythm: Normal rate and regular  rhythm.      Heart sounds: Normal heart sounds. No murmur heard.     No friction rub. No gallop.   Pulmonary:      Effort: Pulmonary effort is normal. No respiratory distress.      Breath sounds: Normal breath sounds. No stridor. No wheezing, rhonchi or rales.   Abdominal:      General: Bowel sounds are normal. There is no distension.      Palpations: Abdomen is soft. There is no mass.      Tenderness: There is abdominal tenderness. There is no right CVA tenderness or left CVA tenderness.      Comments: Mild suprapubic TTP   Musculoskeletal:         General: Normal range of motion.      Cervical back: Normal range of motion and neck supple.   Skin:     General: Skin is warm and dry.      Capillary Refill: Capillary refill takes less than 2 seconds.      Findings: No rash.   Neurological:      Mental Status: She is alert.      Comments: Lethargic on exam. Sitting up in a wheelchair and eyes open occasionally.  Moving all extremity spontaneously but not on command.  She is mumbling and not answering questions.  Appears to be talking to herself.           Diagnostics     Lab Results   Labs Ordered and Resulted from Time of ED Arrival to Time of ED Departure   BASIC METABOLIC PANEL - Abnormal       Result Value    Sodium 137      Potassium 4.2      Chloride 101      Carbon Dioxide (CO2) 22      Anion Gap 14      Urea Nitrogen 31.0 (*)     Creatinine 1.26 (*)     GFR Estimate 44 (*)     Calcium 10.0      Glucose 108 (*)    CBC WITH PLATELETS AND DIFFERENTIAL - Abnormal    WBC Count 5.4      RBC Count 3.51 (*)     Hemoglobin 11.0 (*)     Hematocrit 33.5 (*)     MCV 95      MCH 31.3      MCHC 32.8      RDW 13.1      Platelet Count 174      % Neutrophils 79      % Lymphocytes 13      % Monocytes 6      % Eosinophils 0      % Basophils 0      % Immature Granulocytes 0      NRBCs per 100 WBC 0      Absolute Neutrophils 4.3      Absolute Lymphocytes 0.7 (*)     Absolute Monocytes 0.3      Absolute Eosinophils 0.0      Absolute  Basophils 0.0      Absolute Immature Granulocytes 0.0      Absolute NRBCs 0.0     INR - Normal    INR 0.98     HEPATIC FUNCTION PANEL - Normal    Protein Total 8.0      Albumin 4.1      Bilirubin Total 1.1      Alkaline Phosphatase 85      AST 31      ALT 19      Bilirubin Direct 0.26     LACTIC ACID WHOLE BLOOD WITH 1X REPEAT IN 2 HR WHEN >2 - Normal    Lactic Acid, Initial 0.8     TSH WITH FREE T4 REFLEX - Normal    TSH 3.49     MAGNESIUM - Normal    Magnesium 2.0     INFLUENZA A/B, RSV, & SARS-COV2 PCR - Normal    Influenza A PCR Negative      Influenza B PCR Negative      RSV PCR Negative      SARS CoV2 PCR Negative     AMMONIA - Normal    Ammonia 15     ROUTINE UA WITH MICROSCOPIC REFLEX TO CULTURE     Imaging   CT Abdomen Pelvis w Contrast   Final Result   IMPRESSION:    1.  Acute appearing compression deformity of T11 with mild loss of   height. No definite retropulsed fragments.   2.  Progressive compression deformity of L1 with similar posterior   displacement of fracture fragments.   3.  Stable compression deformity of L3.   4.  No acute process within the abdomen and pelvis.      MADELYN MONTALVO MD            SYSTEM ID:  [id number]      CT Head w/o Contrast   Final Result   IMPRESSION:   No acute findings. Chronic changes as above.         MELISSA RIGGINS MD            SYSTEM ID:  [id number]        EKG   ECG taken at 1706, ECG read at 1710  NSR without acute ST changes  Rate 65 bpm. UT interval 152 ms. QRS duration 66 ms. QT/QTc 436/453 ms. P-R-T axes 30 -3 41.    Independent Interpretation   None    ED Course      Medications Administered   Medications   iopamidol (ISOVUE-370) solution 65 mL (65 mLs Intravenous $Given 8/30/24 1603)   sodium chloride 0.9 % bag 500mL for CT scan flush use (56 mLs Intravenous $Given 8/30/24 1604)     Procedures   None      Discussion of Management   Admitting Hospitalist,     ED Course   ED Course as of 08/30/24 1629   Fri Aug 30, 2024   [id number] I evaluated the patient,  obtained history, and performed a physical exam as detailed above.      Additional Documentation  None    Medical Decision Making / Diagnosis     CMS Diagnoses: None    MIPS   None    MDM   Elsa Jackson is a 74 year old female with dementia who presents with her son for evaluation of lethargy and altered mental status.  Son states that her urine was checked several days ago and was clean.  On evaluation, patient was unable to follow commands and appears to be talking to herself.  She was lethargic at times but awake at other times.  There was mild abdominal tenderness so CT was obtained.  It does show a new T11 compression fracture.  There is no acute metabolic finding to account for the altered mental status.  CT head was negative.  Given her lethargy and altered mental status, I recommend admission for further workup and evaluation and Possibly neurology consultation.  Son is in agreement.  Patient is admitted observation.    Disposition   The patient was admitted to the hospital.     Diagnosis     ICD-10-CM    1. Altered mental status, unspecified altered mental status type  R41.82       2. Nontraumatic compression fracture of T11 vertebra, initial encounter (H)  M48.54XA                Scribe Disclosure:  I, Janelle Blevins, am serving as a scribe at 3:58 PM on 8/30/2024 to document services personally performed by Cal Meadows MD based on my observations and the provider's statements to me.        Cal Meadows MD  08/30/24 2048

## 2024-08-30 NOTE — H&P
[facility]    History and Physical - Hospitalist Service       Date of Admission:  8/30/2024    Assessment & Plan      Elsa Jackson is a 74 year old female admitted on 8/30/2024.     Lewy body dementia  Acute confusional state  Dementia with behavioral abnormality  Probable hallucinations  Patient is unable to give any history and she looks quite confused, hallucinating.  CT scan of the chest did not reveal any pneumonia, she does not have any abdominal tenderness, urine is yet to be collected to rule out UTI at the time of dictation of this note  I will continue Aricept 5 mg at bedtime -May have to increase the dose to 10 mg  Patient takes olanzapine 2.5 mg by mouth 2 times daily will increase it to 4 times daily as needed  Give melatonin at night for sleeping  Check urine and monitor for UTI and if present start antibiotics      JANETTE  Baseline creatinine was around 0.97 which is now increased to 1.26  We will try to give IV antibiotics if patient is able to keep it    Acute appearing T11 compression fracture  Patient might need TLSO brace if the patient is tolerating  Will schedule Tylenol    Hypothyroidism  Continue levothyroxine 112 mcg daily      Diet:  Regular diet  DVT Prophylaxis: Pneumatic Compression Devices  Borjas Catheter: Not present  Lines: None     Cardiac Monitoring: None  Code Status:  DNR/DNI as confirmed from the  and medical records    Clinically Significant Risk Factors Present on Admission                                           Disposition Plan     Medically Ready for Discharge: Anticipated in 2-4 Days           Georges Loera MD  Hospitalist Service  [facility]  Securely message with [employer] (more info)  Text page via [employer] Paging/Directory     ______________________________________________________________________    Chief Complaint   Multiple falls    History is obtained from the patient's  Maykel Jackson who is her primary caregiver,  medical records and my discussion with emergency department physician      History of Present Illness   Elsa Jackson is a 74 year old lady with Lewy body dementia, CKD, hypothyroidism, L1 compression fracture who lives with her  Maykel Jackson who is her primary caregiver.  Recently she had a urinary tract infection for which antibiotics were prescribed.  On getting history from the patient's  he says that she has not falling down more often lately.  That he is unable to take care of her as she continues to be aggressive at times quite this organized not sleeping during the night and keeping awake and is found at different places at home sleeping on the ground because of which she took her to the office to check if you have tract infection has returned.  He states that she does not have any cough, fever, foul-smelling urine.  I am unable to obtain any meaningful reliable history from the patient who is actively hallucinating talking to someone not present.   says that she has been hallucinating at home as well.       Past Medical History    Past Medical History:   Diagnosis Date    Chronic kidney disease, stage II (mild)     Hypothyroidism     L1 vertebral fracture (H)     Lewy body dementia (H)        Past Surgical History   No past surgical history on file.    Prior to Admission Medications   Prior to Admission Medications   Prescriptions Last Dose Informant Patient Reported? Taking?   acetaminophen (TYLENOL) 500 MG tablet   No No   Sig: Take 1-2 tablets (500-1,000 mg) by mouth every 6 hours as needed for mild pain   donepezil (ARICEPT) 5 MG tablet   Yes No   Sig: Take 5 mg by mouth at bedtime   levothyroxine (SYNTHROID/LEVOTHROID) 112 MCG tablet   Yes No   Sig: Take 112 mcg by mouth daily   venlafaxine (EFFEXOR XR) 37.5 MG 24 hr capsule   Yes No   Sig: Take 1 capsule by mouth daily      Facility-Administered Medications: None        Review of Systems    Unable to obtain any review of systems  from the patient    Social History   I have reviewed this patient's social history and updated it with pertinent information if needed.  Social History     Tobacco Use    Smoking status: Never   Substance Use Topics    Alcohol use: Not Currently         Family History   I have reviewed this patient's family history and updated it with pertinent information if needed.  Family History   Problem Relation Age of Onset    Heart Disease Father          Allergies   No Known Allergies     Physical Exam   Vital Signs: Temp: 98.3  F (36.8  C)   BP: (!) 185/100 Pulse: 65   Resp: 27 SpO2: 92 % O2 Device: None (Room air)    Weight: 0 lbs 0 oz      GENERAL: Patient does not look in any acute distress  HEENT: EOM+, Conjunctiva is clear   NECK: , NO Jugular Venous distention  HEART: S1 S2 regular  Rate and Rhythm,    LUNGS: Respirations are not laboured, Lungs are clear to auscultation without Crepitations or Wheezing   ABDOMEN: Soft, there is no tenderness,  Bowel Sounds are Positive   LOWER LIMBS: NO Pedal Edema  Bilaterally   CNS: alert, confused and probably hallucinating, Moving all the Four Limbs           Data   Imaging results reviewed over the past 24 hrs:   Recent Results (from the past 24 hour(s))   CT Head w/o Contrast    Narrative    CT SCAN OF THE HEAD WITHOUT CONTRAST   8/30/2024 4:19 PM     HISTORY: confusion    TECHNIQUE:  Axial images of the head and coronal reformations without  IV contrast material. Radiation dose for this scan was reduced using  automated exposure control, adjustment of the mA and/or kV according  to patient size, or iterative reconstruction technique.    COMPARISON: 8/17/2024    FINDINGS: No midline shift or mass effect. No acute intracranial  hemorrhage. No hydrocephalus or extra-axial hemorrhage. Moderate  chronic small vessel ischemic changes. Moderate global cortical volume  loss with expected dilatation of the ventricular system. Intracranial  atheromatous disease.      Impression     IMPRESSION:   No acute findings. Chronic changes as above.      MELISSA RIGGINS MD         SYSTEM ID:  [id number]   CT Abdomen Pelvis w Contrast    Narrative    CT ABDOMEN PELVIS WITH CONTRAST 8/30/2024 4:20 PM    CLINICAL HISTORY: Abdominal pain. Confusion.     TECHNIQUE: CT scan of the abdomen and pelvis was performed following  injection of IV contrast. Multiplanar reformats were obtained. Dose  reduction techniques were used.  CONTRAST: 65 mL Isovue-370    COMPARISON: None.    FINDINGS: Limited by motion artifact throughout much of the study.    LOWER CHEST: No infiltrates or effusions.    HEPATOBILIARY: No significant mass or bile duct dilatation. No  calcified gallstones.     PANCREAS: No significant mass, duct dilatation, or inflammatory  change.    SPLEEN: Normal size.    ADRENAL GLANDS: No significant nodules.    KIDNEYS/BLADDER: No significant mass, stones, or hydronephrosis.    BOWEL: Diverticulosis in the colon. No acute inflammatory change. No  obstruction. Unremarkable appendix.    PELVIC ORGANS: No pelvic masses. Uterine prolapse.    ADDITIONAL FINDINGS: There are moderate atherosclerotic changes of the  visualized aorta and its branches. There is no evidence of aortic  dissection or aneurysm. No adenopathy or free fluid.    MUSCULOSKELETAL: Progressive compression deformity of L1. Stable  compression deformity of L3. New compression deformity of T11 that  appears acute.      Impression    IMPRESSION:   1.  Acute appearing compression deformity of T11 with mild loss of  height. No definite retropulsed fragments.  2.  Progressive compression deformity of L1 with similar posterior  displacement of fracture fragments.  3.  Stable compression deformity of L3.  4.  No acute process within the abdomen and pelvis.    MADELYN MONTALVO MD         SYSTEM ID:  [id number]     Most Recent 3 CBC's:  Recent Labs   Lab Test 08/30/24  1456 04/21/24  2107 01/10/23  1155   WBC 5.4 8.7 8.8   HGB 11.0* 11.8 12.2   MCV 95 96 99     232 244     Most Recent 3 BMP's:  Recent Labs   Lab Test 08/30/24  1456 04/23/24  0700 04/23/24  0117 04/22/24  0827    138  --  143   POTASSIUM 4.2 3.8  --  4.1   CHLORIDE 101 106  --  110*   CO2 22 21*  --  23   BUN 31.0* 19.7  --  29.2*   CR 1.26* 0.97*  --  1.19*   ANIONGAP 14 11  --  10   MYNOR 10.0 8.7*  --  8.5*   * 81 87 93     Most Recent 2 LFT's:  Recent Labs   Lab Test 08/30/24  1521 04/21/24  2107   AST 31 27   ALT 19 16   ALKPHOS 85 90   BILITOTAL 1.1 1.0

## 2024-08-31 NOTE — PLAN OF CARE
"  Problem: Adult Inpatient Plan of Care  Goal: Plan of Care Review  Description: The Plan of Care Review/Shift note should be completed every shift.  The Outcome Evaluation is a brief statement about your assessment that the patient is improving, declining, or no change.  This information will be displayed automatically on your shift  note.  Outcome: Not Progressing  Flowsheets (Taken 8/31/2024 1727)  Outcome Evaluation: Disoriented x3, has not gotten out of bed, needs encouragment to eat and drink, IV infusing, incontinent of urine, denies pain, plan for lumbar MRI  Plan of Care Reviewed With:   spouse   patient  Overall Patient Progress: no change  Goal: Patient-Specific Goal (Individualized)  Description: You can add care plan individualizations to a care plan. Examples of Individualization might be:  \"Parent requests to be called daily at 9am for status\", \"I have a hard time hearing out of my right ear\", or \"Do not touch me to wake me up as it startles  me\".  Outcome: Not Progressing  Goal: Absence of Hospital-Acquired Illness or Injury  Outcome: Not Progressing  Intervention: Identify and Manage Fall Risk  Recent Flowsheet Documentation  Taken 8/31/2024 1430 by Gwen Brown, RN  Safety Promotion/Fall Prevention: safety round/check completed  Intervention: Prevent Skin Injury  Recent Flowsheet Documentation  Taken 8/31/2024 1430 by Gwen Brown RN  Body Position: position changed independently  Intervention: Prevent Infection  Recent Flowsheet Documentation  Taken 8/31/2024 1430 by Gwen Brown, RN  Infection Prevention:   rest/sleep promoted   hand hygiene promoted  Goal: Optimal Comfort and Wellbeing  Outcome: Not Progressing  Goal: Readiness for Transition of Care  Outcome: Not Progressing  Intervention: Mutually Develop Transition Plan  Recent Flowsheet Documentation  Taken 8/31/2024 1448 by Gwen rBown, RN  Equipment Currently Used at Home: walker, rolling     Goal Outcome Evaluation:      Plan of " Care Reviewed With: spouse, patient    Overall Patient Progress: no changeOverall Patient Progress: no change    Outcome Evaluation: Disoriented x3, has not gotten out of bed, needs encouragment to eat and drink, IV infusing, incontinent of urine, denies pain, plan for lumbar MRI

## 2024-08-31 NOTE — PLAN OF CARE
"Goal Outcome Evaluation:      Plan of Care Reviewed With: patient    Overall Patient Progress: no changeOverall Patient Progress: no change           Problem: Adult Inpatient Plan of Care  Goal: Plan of Care Review  Description: The Plan of Care Review/Shift note should be completed every shift.  The Outcome Evaluation is a brief statement about your assessment that the patient is improving, declining, or no change.  This information will be displayed automatically on your shift  note.  Outcome: Not Progressing  Flowsheets (Taken 8/31/2024 0632)  Plan of Care Reviewed With: patient  Overall Patient Progress: no change  Goal: Patient-Specific Goal (Individualized)  Description: You can add care plan individualizations to a care plan. Examples of Individualization might be:  \"Parent requests to be called daily at 9am for status\", \"I have a hard time hearing out of my right ear\", or \"Do not touch me to wake me up as it startles  me\".  Outcome: Not Progressing  Goal: Absence of Hospital-Acquired Illness or Injury  Outcome: Not Progressing  Intervention: Identify and Manage Fall Risk  Recent Flowsheet Documentation  Taken 8/30/2024 2200 by Julia Yusuf, RN  Safety Promotion/Fall Prevention:   safety round/check completed   nonskid shoes/slippers when out of bed   clutter free environment maintained  Intervention: Prevent Infection  Recent Flowsheet Documentation  Taken 8/30/2024 2200 by Julia Yusuf, RN  Infection Prevention:   rest/sleep promoted   hand hygiene promoted  Goal: Optimal Comfort and Wellbeing  Outcome: Not Progressing  Goal: Readiness for Transition of Care  Outcome: Not Progressing     "

## 2024-08-31 NOTE — CONSULTS
Care Management Initial Consult    General Information  Assessment completed with: Spouse or significant otherMaykel  Type of CM/SW Visit: Initial Assessment    Primary Care Provider verified and updated as needed: Yes   Readmission within the last 30 days: no previous admission in last 30 days      Reason for Consult: discharge planning  Advance Care Planning: Advance Care Planning Reviewed: no concerns identified          Communication Assessment  Patient's communication style: spoken language (English or Bilingual)    Hearing Difficulty or Deaf: no   Wear Glasses or Blind: no    Cognitive  Cognitive/Neuro/Behavioral: .WDL except, orientation  Level of Consciousness: confused, lethargic  Arousal Level: arouses to touch/gentle shaking  Orientation: disoriented to, place, time, situation  Mood/Behavior: calm  Best Language: 0 - No aphasia  Speech: illogical, other (see comments) (mumbing)    Living Environment:   People in home: spouse  Maykel  Current living Arrangements: house      Able to return to prior arrangements: yes       Family/Social Support:  Care provided by: spouse/significant other  Provides care for: no one, unable/limited ability to care for self  Marital Status:   Support system:   Maykel       Description of Support System: Supportive, Involved    Support Assessment: Caregiver difficulty providing physical care/safety, Caregiver experiencing high stress    Current Resources:   Patient receiving home care services: No        Community Resources: None  Equipment currently used at home: walker, rolling  Supplies currently used at home: None    Employment/Financial:  Employment Status: retired        Financial Concerns: none   Referral to Financial Worker: No       Does the patient's insurance plan have a 3 day qualifying hospital stay waiver?  No    Lifestyle & Psychosocial Needs:  Social Determinants of Health     Food Insecurity: Not on file   Depression: At risk (6/13/2024)     Received from [facility]    PHQ-2     PHQ-2 Score: 4   Housing Stability: Not on file   Tobacco Use: Low Risk  (7/25/2024)    Received from [facility]    Patient History     Smoking Tobacco Use: Never     Smokeless Tobacco Use: Never     Passive Exposure: Not on file   Financial Resource Strain: Not on file   Alcohol Use: Unknown (11/25/2019)    [facility]    AUDIT-C     Frequency of Alcohol Consumption: 2-3 times a week     Average Number of Drinks: 1 or 2     Frequency of Binge Drinking: Not on file   Transportation Needs: Not on file   Physical Activity: Not on file   Interpersonal Safety: Not on file   Stress: Not on file   Social Connections: Not on file   Health Literacy: Not on file       Functional Status:  Prior to admission patient needed assistance:   Dependent ADLs:: Ambulation-walker, Bathing, Dressing, Eating, Grooming, Incontinence, Transfers, Toileting  Dependent IADLs:: Cleaning, Cooking, Laundry, Shopping, Meal Preparation, Medication Management, Money Management, Transportation, Incontinence  Assesssment of Functional Status: At functional baseline    Mental Health Status:  Mental Health Status: No Current Concerns       Chemical Dependency Status:  Chemical Dependency Status: No Current Concerns             Values/Beliefs:  Spiritual, Cultural Beliefs, Adventist Practices, Values that affect care: no               Discussed  Partnership in Safe Discharge Planning  document with patient/family: Yes:     Additional Information:  Writer met with pt and spouse Maykel. Pt did not open her eyes during conversation and was unable to offer meaningful contribution to the discussion. Maykel reports that ideally he would like to return home with his wife, however he knows this is not really realistic. He is no longer able to provide the physical care that she needs nor the constant supervision.  He reports that the pt is up throughout the night, moving things around  in the house like taking everything out of the cupboards, struggles with incontinence and falling has increased.  He tries to get her to use her walker, however she loses balance sideways and falls down.      Maykel reviews there are family members who are supportive but do not live close by. He is a disabled  but [employer] will not provide benefits for his wife.  He reports that they do have the financial means to pay for care and/or a facility but he is concerned about long term affordability. He reports stopping by [facility] today and receiving a brochure; but is unable to talk to anyone until Tuesday (due to Labor Day).  He was happy to see the activities they offer regularly to the residents there.  Writer discussed a variety of options from [facility], Memory Care, private pay in home support, pt and Maykel moving together or just patient moving into a care facility.  Maykel was provided with information for Assisted Living Locators to help him navigator the options.     He is interested in what PT has to recommend, as well as palliative. He is also interested to know what the estimated progression and timeline will be for pt for when she will pass away.     Next Steps: PT to see pt this afternoon.  SW will follow up with Maykel on Sunday.     Yany Blackwell [facility], [facility]   Inpatient Care Coordination  [facility]  [phone]

## 2024-08-31 NOTE — PROGRESS NOTES
[facility]    Medicine Progress Note - Hospitalist Service    Date of Admission:  8/30/2024    Assessment & Plan   74-year-old female with history of Lewy body dementia who lives with her  who is her primary caregiver.  She was brought to the ER because of increasing confusion.  Patient has been found lying down on the floor at different parts of the house incontinent of urine.  She has been recently treated with antibiotics for UTI.  The patient's confusion has been worsening over the last several years and patient's  has been has not noticed any acute worsening.    Lewy body dementia  Acute confusional state  Dementia with behavioral abnormality  Probable hallucinations  Patient is unable to give any history and she looks quite confused, hallucinating.  CT scan of the chest did not reveal any pneumonia, she does not have any abdominal tenderness, urine is yet to be collected to rule out UTI at the time of dictation of this note  I will continue Aricept 5 mg at bedtime -May have to increase the dose to 10 mg  Patient takes olanzapine 2.5 mg by mouth 2 times daily will increase it to 4 times daily as needed  Give melatonin at night for sleeping  Check urine and monitor for UTI and if present start antibiotics        JANETTE  Baseline creatinine was around 0.97 which is now increased to 1.26  We will try to give IV antibiotics if patient is able to keep it     Acute appearing T11 compression fracture  Neurosurgery consulted, MRI ordered.     Hypothyroidism  Continue levothyroxine 112 mcg daily          Diet: Regular Diet Adult    DVT Prophylaxis: Pneumatic Compression Devices  Borjas Catheter: Not present  Lines: None     Cardiac Monitoring: None  Code Status: No CPR- Do NOT Intubate      Clinically Significant Risk Factors Present on Admission                              # Financial/Environmental Concerns: none               Disposition Plan     Medically Ready for Discharge:  Anticipated Tomorrow             Harley Garcia MD  Hospitalist Service  [facility]  Securely message with [facility] (more info)  Text page via [employer] Paging/Directory   ______________________________________________________________________    Interval History   Continues to be confused.  Able on physical questions.  Complains of back pain.    Physical Exam   Vital Signs: Temp: 97.3  F (36.3  C) Temp src: Oral BP: (!) 158/91 Pulse: 67   Resp: 16 SpO2: 98 % O2 Device: None (Room air)    Weight: 0 lbs 0 oz    Patient is awake, answer simple questions but otherwise does not make much sense.   at bedside.  Appears comfortable.  S1-S2 regular.  Lungs clear to auscultation.  Abdomen is nontender.  No edema in bilateral extremities.    Medical Decision Making       MANAGEMENT DISCUSSED with the following over the past 24 hours: Patient       Data     I have personally reviewed the following data over the past 24 hrs:    N/A  \   N/A   / N/A     142 108 (H) 23.3 (H) /  79   4.3 23 0.86 \       Imaging results reviewed over the past 24 hrs:   No results found for this or any previous visit (from the past 24 hour(s)).

## 2024-08-31 NOTE — PROGRESS NOTES
[facility]    ED Boarding Nurse Handoff Addendum Report:    Date/time: 8/31/2024, 2:47 PM    Activity Level: in bed    Fall Risk: Yes:  activity supervised - frequent falls, family noted falls almost everyday at home, at night.    Active Infusions: NS @ 100    Current Meds Due: Pt did not tolerate oral meds this am levo and effexor sent upstairs with pt.     Current care needs: SW - placement. Pt likely to need a brace for compression fracture.    Oxygen requirements (liters/min and/or FiO2): 0    Respiratory status: Room air    Vital signs (within last 30 minutes):    Vitals:    08/31/24 0121 08/31/24 0836 08/31/24 1213 08/31/24 1246   BP: 129/84 (!) 143/75 (!) 147/109 135/80   BP Location: Right arm  Right arm Left arm   Pulse:  53 68 70   Resp: 16 16 16 16   Temp: 97.5  F (36.4  C) (!) 96.5  F (35.8  C) 98  F (36.7  C) 97.8  F (36.6  C)   TempSrc: Temporal Temporal Oral Oral   SpO2: 98% 97% 98% 99%       Focused assessment within last 30 minutes:    Pt drowsy, and disoriented. Dementia. Arousing to gentle touch. Illogical mumbled speech, sleeping most of morning. Incontinent of bladder, did not see a BM today. Lungs clear, hr reg, bowel active. Poor appetite, but ate 75% of a jello just prior to transfer to [facility]. Pt  at bedside who is primary caregiver.  unable to meet care needs - appreciate SW help with placement, ?memory care? Pt not OOB for me. I suspect profound weakness given frequent falls. Denies pain.     ED Boarding Nurse name: Keith Roberts RN

## 2024-08-31 NOTE — PROGRESS NOTES
[facility]    ED Boarding Nurse Handoff Addendum Report:    Date/time: 8/31/2024, 5:58 AM    Activity Level: in bed    Fall Risk: Yes:  nonskid shoes/slippers when out of bed    Active Infusions: NS @ 100ml.     Current Meds Due: N/A    Current care needs: Monitoring, fluids     Oxygen requirements (liters/min and/or FiO2): RA    Respiratory status: Room air    Vital signs (within last 30 minutes):    Vitals:    08/30/24 1807 08/30/24 1818 08/30/24 1828 08/31/24 0121   BP:  (!) 185/100  129/84   BP Location:    Right arm   Pulse: 64 58 65    Resp:    16   Temp:    97.5  F (36.4  C)   TempSrc:    Temporal   SpO2: 98%  92% 98%       Focused assessment within last 30 minutes:    Alert to self. IV infusing @ 100. Purewick in place. Not oob. Took meds w/ apple sauce     ED Boarding Nurse name: Julia Yusuf RN

## 2024-08-31 NOTE — PROGRESS NOTES
Neurosurgery consult request for T11 compression fracture noted.    Patient known to NS as we have followed for L1 compression fracture treated with TLSO January 2023.    CT abd:    IMPRESSION:   1.  Acute appearing compression deformity of T11 with mild loss of  height. No definite retropulsed fragments.  2.  Progressive compression deformity of L1 with similar posterior  displacement of fracture fragments.  3.  Stable compression deformity of L3.  4.  No acute process within the abdomen and pelvis.    RECOMMENDATIONS:  Lumbar MRI to include T11 to evaluate fractures.  NS will make final recommendations and see patient in consult after MRI completed.    BALDOMERO Zaman  [facility]  [address]  [address] [place] [id number]    Tel [phone]  Pager [phone]

## 2024-08-31 NOTE — PHARMACY-ADMISSION MEDICATION HISTORY
Pharmacy Intern Admission Medication History    Admission medication history is complete. The information provided in this note is only as accurate as the sources available at the time of the update.    Information Source(s): Family member and CareEverywhere/SureScripts via in-person    Pertinent Information: None    Changes made to PTA medication list:  Added: OLANZapine   Deleted: None  Changed: None    Allergies reviewed with patient and updates made in EHR: yes    Medication History Completed By: Haseeb Doran 8/30/2024 8:00 PM    PTA Med List   Medication Sig Last Dose    acetaminophen (TYLENOL) 500 MG tablet Take 1-2 tablets (500-1,000 mg) by mouth every 6 hours as needed for mild pain Unknown at PRN    donepezil (ARICEPT) 5 MG tablet Take 5 mg by mouth at bedtime 8/29/2024 at PM    levothyroxine (SYNTHROID/LEVOTHROID) 112 MCG tablet Take 112 mcg by mouth daily 8/30/2024 at AM    OLANZapine (ZYPREXA) 2.5 MG tablet Take 2.5 mg by mouth 2 times daily. 8/30/2024 at AM    venlafaxine (EFFEXOR XR) 37.5 MG 24 hr capsule Take 1 capsule by mouth daily 8/30/2024 at AM

## 2024-09-01 NOTE — CONSULTS
Neurosurgery Consult    Assessment  New T11 compression fracture.  Chronic L1 compression fracture.    Plan:  Long discussion with  regarding new fracture.   They already have a TLSO they can wear when out of bed if she find it to be helpful.  Thoracic upright x-ray's prior to discharge to serve as baseline.  Follow un in NS clinic in 4-6 weeks with thoracic x-ray's day of appt. (Of note was not able to see us in clinic last year due to [employer] insurance.  Her insurance has changed to [employer] now)      HPI  Elsa Jackson is a 74 year old female known to NS as we have followed for L1 compression fracture treated with TLSO January 2023 who presents to the ER with  generalized weakness. Her  states that for the past two weeks, patient has had increased lethargy and is mumbling her speech. She uses a walker to ambulate and fell a couple of days ago. She was seen in the ED a couple of weeks ago for a UTI and was started on Cephalexin. She has finished the course. She had a PCP appointment a couple of days ago and her urine was negative for UTI. She received Ativan for her lumbar MRI today and is very difficult to arouse for questions or exam.      Medical history  Stage 2 CKD   Hypothyroidism  Lewy body dementia   Colon polyp  Osteoporosis     Social history  Lives at [facility] with .  He is unable to care for her and is interested in placement.    Exam  B/P: 140/81, T: 98.6, P: 60, R: 16   Sleeping (received Ativan) and difficult to arouse.  Will open eyes very briefly but falls back asleep.  Unable to perform neuro exam but per  and nursing moving all four extremities well.    Imaging    IMPRESSION:  1.  Recent (acute or subacute) moderate T11 compression fracture with slight retropulsion of fracture fragments contributing to mild spinal canal stenosis, unchanged since 08/30/2024.  2.  Chronic severe L1 compression fracture with retropulsed fracture fragments contributing to mild to  moderate spinal canal stenosis, unchanged since 08/30/2024, though demonstrating progressive vertebral body collapse since 01/10/2023.  3.  Stable alignment of chronic mild to moderate L3 superior endplate compression fracture with superimposed Schmorl's node deformity dating back to at least 01/10/2023.  4.  Multilevel lumbar spondylosis.    Discussed with Dr. Cisneros.    Suad Caldwell, MPAS  [facility]  [address] [address]    Tel [phone]  Pager [phone]

## 2024-09-01 NOTE — PLAN OF CARE
Care from [id number]      Inpatient Progress Note:  For complete assessment see flow sheet documentation.       BP (!) 157/94 (BP Location: Left arm)   Pulse 67   Temp 97.3  F (36.3  C) (Oral)   Resp 20   SpO2 97%        Neuro: Pt is alert to her self. Opens eyes to touch, speech is illogical/ mumbled.  Vital Signs: High BP but frequently shifts during checks.   Pain/Comfort: Given PRN dilaudid per PAINAD scale, scheduled tylenol.   Diet/po intake: Needs encouragement for eating and drinking.   Output: Incontinent at baseline.    Activity/Ambulation: Pt has not been out of bed, is Ax2 to turn in bed. Pt frequently shifting and at times tryingto get out of bed.   Infusions: NS @ 50 mL/hr  Major Shift Events: Pt was unable to go to MRI today due to frequent shifting, inability to follow directions.   Plan (Upcoming Events): Possible MRI for fractures in spine, SW for placement.   Discharge/Transfer Needs: Pt need new placement as  is not able to care for pt any longer.     Will continue with POC.          Will continue to monitor and provide cares.      Goal Outcome Evaluation:      Plan of Care Reviewed With: patient    Overall Patient Progress: no changeOverall Patient Progress: no change    Outcome Evaluation: Alert to self. Has not been able to ambulate, continues to try and get out of bed. IV infusing, urinary incontinent. Pain meds given per PAINAD scale. Pt unable to complete MRI tonight due to frequent shifts in position and inability to follow directions. Frequent checks as pt tries to get out of bed frequently.      Problem: Adult Inpatient Plan of Care  Goal: Plan of Care Review  Description: The Plan of Care Review/Shift note should be completed every shift.  The Outcome Evaluation is a brief statement about your assessment that the patient is improving, declining, or no change.  This information will be displayed automatically on your shift  note.  Outcome: Not Progressing  Flowsheets (Taken  "8/31/2024 2237)  Outcome Evaluation: Alert to self. Has not been able to ambulate, continues to try and get out of bed. IV infusing, urinary incontinent. Pain meds given per PAINAD scale. Pt unable to complete MRI tonight due to frequent shifts in position and inability to follow directions. Frequent checks as pt tries to get out of bed frequently.  Plan of Care Reviewed With: patient  Overall Patient Progress: no change  Goal: Patient-Specific Goal (Individualized)  Description: You can add care plan individualizations to a care plan. Examples of Individualization might be:  \"Parent requests to be called daily at 9am for status\", \"I have a hard time hearing out of my right ear\", or \"Do not touch me to wake me up as it startles  me\".  Outcome: Not Progressing  Goal: Absence of Hospital-Acquired Illness or Injury  Outcome: Not Progressing  Intervention: Identify and Manage Fall Risk  Recent Flowsheet Documentation  Taken 8/31/2024 2200 by Nidia Villegas RN  Safety Promotion/Fall Prevention:   safety round/check completed   room organization consistent   room near nurse's station   room door open   assistive device/personal items within reach  Taken 8/31/2024 2049 by Nidia Villegas RN  Safety Promotion/Fall Prevention:   safety round/check completed   room organization consistent   room near nurse's station   room door open   assistive device/personal items within reach  Intervention: Prevent Skin Injury  Recent Flowsheet Documentation  Taken 8/31/2024 2008 by Nidia Villegas RN  Body Position: position changed independently  Intervention: Prevent Infection  Recent Flowsheet Documentation  Taken 8/31/2024 2049 by Nidia Villegas RN  Infection Prevention:   rest/sleep promoted   personal protective equipment utilized  Goal: Optimal Comfort and Wellbeing  Outcome: Not Progressing  Goal: Readiness for Transition of Care  Outcome: Not Progressing     Problem: Dementia Signs/Symptoms  Goal: Improved " Behavioral Control (Dementia Signs/Symptoms)  Outcome: Not Progressing  Goal: Improved Mood Symptoms (Dementia Signs/Symptoms)  Outcome: Not Progressing  Goal: Optimized Cognitive Function (Dementia Signs/Symptoms)  Outcome: Not Progressing  Goal: Optimized Oral Intake (Dementia Signs/Symptoms)  Outcome: Not Progressing  Goal: Improved Sleep (Dementia Signs/Symptoms)  Outcome: Not Progressing  Goal: Enhanced Social or Functional Skills and Ability (Dementia Signs/Symptoms)  Outcome: Not Progressing     Problem: Orthopaedic Fracture  Goal: Absence of Bleeding  Outcome: Not Progressing  Goal: Bowel Elimination  Outcome: Not Progressing  Goal: Absence of Embolism Signs and Symptoms  Outcome: Not Progressing  Goal: Fracture Stability  Outcome: Not Progressing  Goal: Optimal Functional Ability  Outcome: Not Progressing  Intervention: Optimize Functional Ability  Recent Flowsheet Documentation  Taken 8/31/2024 2008 by Nidia Villegas RN  Activity Management: activity adjusted per tolerance  Positioning/Transfer Devices:   pillows   in use  Goal: Absence of Infection Signs and Symptoms  Outcome: Not Progressing  Goal: Effective Tissue Perfusion  Outcome: Not Progressing  Goal: Optimal Pain Control and Function  Outcome: Not Progressing  Goal: Effective Oxygenation and Ventilation  Outcome: Not Progressing  Intervention: Promote Airway Secretion Clearance  Recent Flowsheet Documentation  Taken 8/31/2024 2049 by Nidia Villegas RN  Cough And Deep Breathing: done with encouragement  Taken 8/31/2024 2008 by Nidia Villegas RN  Activity Management: activity adjusted per tolerance  Intervention: Optimize Oxygenation and Ventilation  Recent Flowsheet Documentation  Taken 8/31/2024 2008 by Nidia Villegas RN  Head of Bed (HOB) Positioning: HOB at 15 degrees     Problem: Acute Kidney Injury/Impairment  Goal: Fluid and Electrolyte Balance  Outcome: Not Progressing  Goal: Improved Oral Intake  Outcome: Not  Progressing  Goal: Effective Renal Function  Outcome: Not Progressing  Intervention: Monitor and Support Renal Function  Recent Flowsheet Documentation  Taken 8/31/2024 2200 by Nidia Villegas RN  Medication Review/Management: medications reviewed  Taken 8/31/2024 2049 by Nidia Villegas RN  Medication Review/Management: medications reviewed     Problem: Fall Injury Risk  Goal: Absence of Fall and Fall-Related Injury  Outcome: Not Progressing  Intervention: Identify and Manage Contributors  Recent Flowsheet Documentation  Taken 8/31/2024 2200 by Nidia Villegas RN  Medication Review/Management: medications reviewed  Taken 8/31/2024 2049 by Nidia Villegas RN  Medication Review/Management: medications reviewed  Intervention: Promote Injury-Free Environment  Recent Flowsheet Documentation  Taken 8/31/2024 2200 by Nidia Villegas RN  Safety Promotion/Fall Prevention:   safety round/check completed   room organization consistent   room near nurse's station   room door open   assistive device/personal items within reach  Taken 8/31/2024 2049 by Nidia Villegas RN  Safety Promotion/Fall Prevention:   safety round/check completed   room organization consistent   room near nurse's station   room door open   assistive device/personal items within reach     Problem: Pain Acute  Goal: Optimal Pain Control and Function  Outcome: Not Progressing  Intervention: Prevent or Manage Pain  Recent Flowsheet Documentation  Taken 8/31/2024 2200 by Nidia Villegas RN  Medication Review/Management: medications reviewed  Taken 8/31/2024 2049 by Nidia Villegas RN  Medication Review/Management: medications reviewed

## 2024-09-01 NOTE — PLAN OF CARE
"Goal Outcome Evaluation:      Plan of Care Reviewed With: patient    Overall Patient Progress: no changeOverall Patient Progress: no change    Outcome Evaluation: A/o to self.  at the bedside; tearful. Mostly resting comfortably eyes closed. Turn and Repo. Incontinent. Pills crushed in pudding. PO ativan given prior to MRI. X-ray done this shift. Not OOB. Tolerating PO but needs to be fed. Unable to follow directions with turns and feeding.     BP (!) 140/81 (BP Location: Left arm)   Pulse 60   Temp 98.6  F (37  C) (Axillary)   Resp 16   SpO2 95%     Problem: Adult Inpatient Plan of Care  Goal: Plan of Care Review  Description: The Plan of Care Review/Shift note should be completed every shift.  The Outcome Evaluation is a brief statement about your assessment that the patient is improving, declining, or no change.  This information will be displayed automatically on your shift  note.  Outcome: Progressing  Flowsheets (Taken 9/1/2024 1606)  Outcome Evaluation:   A/o to self.  at the bedside   tearful. Mostly resting comfortably eyes closed. Turns and Repo. Incontinent. Awake for meals. Pills crushed in pudding. PO ativan given prior to MRI.  Plan of Care Reviewed With: patient  Overall Patient Progress: no change  Goal: Patient-Specific Goal (Individualized)  Description: You can add care plan individualizations to a care plan. Examples of Individualization might be:  \"Parent requests to be called daily at 9am for status\", \"I have a hard time hearing out of my right ear\", or \"Do not touch me to wake me up as it startles  me\".  Outcome: Progressing  Goal: Absence of Hospital-Acquired Illness or Injury  Outcome: Progressing  Intervention: Identify and Manage Fall Risk  Recent Flowsheet Documentation  Taken 9/1/2024 1120 by Jacque Ricketts, RN  Safety Promotion/Fall Prevention:   activity supervised   assistive device/personal items within reach   clutter free environment maintained   increased rounding " and observation   increase visualization of patient   nonskid shoes/slippers when out of bed   room door open   room near nurse's station   room organization consistent   safety round/check completed  Intervention: Prevent Skin Injury  Recent Flowsheet Documentation  Taken 9/1/2024 1120 by Jacque Ricketts RN  Body Position: position changed independently  Intervention: Prevent Infection  Recent Flowsheet Documentation  Taken 9/1/2024 1120 by Jacque Ricketts RN  Infection Prevention:   single patient room provided   rest/sleep promoted  Goal: Optimal Comfort and Wellbeing  Outcome: Progressing  Goal: Readiness for Transition of Care  Outcome: Progressing     Problem: Dementia Signs/Symptoms  Goal: Improved Behavioral Control (Dementia Signs/Symptoms)  Outcome: Progressing  Goal: Improved Mood Symptoms (Dementia Signs/Symptoms)  Outcome: Progressing  Goal: Optimized Cognitive Function (Dementia Signs/Symptoms)  Outcome: Progressing  Goal: Optimized Oral Intake (Dementia Signs/Symptoms)  Outcome: Progressing  Goal: Improved Sleep (Dementia Signs/Symptoms)  Outcome: Progressing  Goal: Enhanced Social or Functional Skills and Ability (Dementia Signs/Symptoms)  Outcome: Progressing     Problem: Orthopaedic Fracture  Goal: Absence of Bleeding  Outcome: Progressing  Goal: Bowel Elimination  Outcome: Progressing  Goal: Absence of Embolism Signs and Symptoms  Outcome: Progressing  Goal: Fracture Stability  Outcome: Progressing  Goal: Optimal Functional Ability  Outcome: Progressing  Intervention: Optimize Functional Ability  Recent Flowsheet Documentation  Taken 9/1/2024 1120 by Jacque Ricketts RN  Activity Management: activity adjusted per tolerance  Goal: Absence of Infection Signs and Symptoms  Outcome: Progressing  Goal: Effective Tissue Perfusion  Outcome: Progressing  Goal: Optimal Pain Control and Function  Outcome: Progressing  Goal: Effective Oxygenation and Ventilation  Outcome: Progressing  Intervention: Promote  Airway Secretion Clearance  Recent Flowsheet Documentation  Taken 9/1/2024 1120 by Jacque Ricketts RN  Activity Management: activity adjusted per tolerance  Intervention: Optimize Oxygenation and Ventilation  Recent Flowsheet Documentation  Taken 9/1/2024 1120 by Jacque Ricketts RN  Head of Bed (HOB) Positioning: HOB at 20-30 degrees     Problem: Acute Kidney Injury/Impairment  Goal: Fluid and Electrolyte Balance  Outcome: Progressing  Goal: Improved Oral Intake  Outcome: Progressing  Goal: Effective Renal Function  Outcome: Progressing  Intervention: Monitor and Support Renal Function  Recent Flowsheet Documentation  Taken 9/1/2024 1120 by Jacque Ricketts RN  Medication Review/Management: medications reviewed     Problem: Fall Injury Risk  Goal: Absence of Fall and Fall-Related Injury  Outcome: Progressing  Intervention: Identify and Manage Contributors  Recent Flowsheet Documentation  Taken 9/1/2024 1120 by Jacque Ricketts RN  Medication Review/Management: medications reviewed  Intervention: Promote Injury-Free Environment  Recent Flowsheet Documentation  Taken 9/1/2024 1120 by Jacque Ricketts RN  Safety Promotion/Fall Prevention:   activity supervised   assistive device/personal items within reach   clutter free environment maintained   increased rounding and observation   increase visualization of patient   nonskid shoes/slippers when out of bed   room door open   room near nurse's station   room organization consistent   safety round/check completed     Problem: Pain Acute  Goal: Optimal Pain Control and Function  Outcome: Progressing  Intervention: Prevent or Manage Pain  Recent Flowsheet Documentation  Taken 9/1/2024 1120 by Jacque Ricketts RN  Medication Review/Management: medications reviewed

## 2024-09-01 NOTE — PROGRESS NOTES
PT: Per chart review, NS has ordered lumbar MRI to further assess compression fx.  Further chart review reports pt unable to lay still long enough for MRI to be completed.  PT to hold until NS okays mobility/finalizes their recommendations.    Will continue to follow and initiate PT evaluation once NS clears.

## 2024-09-02 NOTE — PLAN OF CARE
Goal Outcome Evaluation:      Plan of Care Reviewed With: spouse    Overall Patient Progress: no changeOverall Patient Progress: no change    Outcome Evaluation: Disoriented x4. slept all day.  Vitals: BP (!) 161/90 (BP Location: Left arm)   Pulse 80   Temp 97.4  F (36.3  C) (Axillary)   Resp 16   SpO2 95%       [id number]  Admitted Diagnosis: AMS     Neuro: disoriented x4. Slept all day. Arouses for touch but won't open eyes. Speech grabbled. Noted reposes better for spouse.     Cardiac: wdl ex for elevated BP.   Lungs: wdl  GI: wdl ex for incontinent of bowel. Pt had loose stools x7 on this shift. Stool sample sent to the lab for enteric and c-diff rule out.    : wdl ex incontinent for bladder  Pain: no nonverbal sign of pain noted at rest. Noted facial grimacing with movement during care. On schedule Tylenol. Spiked on fever 100.8 this afternoon. Cross cover notified. BMB,CBC ordered.   IV: saline locked  Labs/Imaging: enteric panel and c-diff rule out pending.    Diet: regular diet. Feeder.  assisting at meal time. Poor po intake.  Activity: assist of x2 in bed. Hasn't out of bed due to somnolence. PT also unable to work with pt.      Consult: PT, Palliative, SW  Plan: follow up with stool panel. Work with PT when awake. Possible placement to appropriate skilled facility pending PT evaluation.     Problem: Adult Inpatient Plan of Care  Goal: Plan of Care Review  Description: The Plan of Care Review/Shift note should be completed every shift.  The Outcome Evaluation is a brief statement about your assessment that the patient is improving, declining, or no change.  This information will be displayed automatically on your shift  note.  Outcome: Not Progressing  Flowsheets (Taken 9/2/2024 1801)  Outcome Evaluation: Disoriented x4. slept all day.  Plan of Care Reviewed With: spouse  Overall Patient Progress: no change  Goal: Patient-Specific Goal (Individualized)  Description: You can add care plan  "individualizations to a care plan. Examples of Individualization might be:  \"Parent requests to be called daily at 9am for status\", \"I have a hard time hearing out of my right ear\", or \"Do not touch me to wake me up as it startles  me\".  Outcome: Not Progressing  Goal: Absence of Hospital-Acquired Illness or Injury  Outcome: Not Progressing  Intervention: Identify and Manage Fall Risk  Recent Flowsheet Documentation  Taken 9/2/2024 1000 by Cody Becerra RN  Safety Promotion/Fall Prevention:   activity supervised   clutter free environment maintained   mobility aid in reach  Intervention: Prevent Infection  Recent Flowsheet Documentation  Taken 9/2/2024 1000 by Cody Becerra, RN  Infection Prevention: rest/sleep promoted  Goal: Optimal Comfort and Wellbeing  Outcome: Not Progressing  Goal: Readiness for Transition of Care  Outcome: Not Progressing     Problem: Dementia Signs/Symptoms  Goal: Improved Behavioral Control (Dementia Signs/Symptoms)  Outcome: Not Progressing  Goal: Improved Mood Symptoms (Dementia Signs/Symptoms)  Outcome: Not Progressing  Goal: Optimized Cognitive Function (Dementia Signs/Symptoms)  Outcome: Not Progressing  Goal: Optimized Oral Intake (Dementia Signs/Symptoms)  Outcome: Not Progressing  Goal: Improved Sleep (Dementia Signs/Symptoms)  Outcome: Not Progressing  Goal: Enhanced Social or Functional Skills and Ability (Dementia Signs/Symptoms)  Outcome: Not Progressing     Problem: Orthopaedic Fracture  Goal: Absence of Bleeding  Outcome: Not Progressing  Goal: Bowel Elimination  Outcome: Not Progressing  Goal: Absence of Embolism Signs and Symptoms  Outcome: Not Progressing  Goal: Fracture Stability  Outcome: Not Progressing  Goal: Optimal Functional Ability  Outcome: Not Progressing  Intervention: Optimize Functional Ability  Recent Flowsheet Documentation  Taken 9/2/2024 1000 by Cody Becerra RN  Activity Management: activity adjusted per tolerance  Goal: Absence of " Infection Signs and Symptoms  Outcome: Not Progressing  Goal: Effective Tissue Perfusion  Outcome: Not Progressing  Goal: Optimal Pain Control and Function  Outcome: Not Progressing  Goal: Effective Oxygenation and Ventilation  Outcome: Not Progressing  Intervention: Promote Airway Secretion Clearance  Recent Flowsheet Documentation  Taken 9/2/2024 1000 by Cody Becerra RN  Activity Management: activity adjusted per tolerance     Problem: Acute Kidney Injury/Impairment  Goal: Fluid and Electrolyte Balance  Outcome: Not Progressing  Goal: Improved Oral Intake  Outcome: Not Progressing  Goal: Effective Renal Function  Outcome: Not Progressing  Intervention: Monitor and Support Renal Function  Recent Flowsheet Documentation  Taken 9/2/2024 1000 by Cody Becerra RN  Medication Review/Management: medications reviewed     Problem: Fall Injury Risk  Goal: Absence of Fall and Fall-Related Injury  Outcome: Not Progressing  Intervention: Identify and Manage Contributors  Recent Flowsheet Documentation  Taken 9/2/2024 1000 by Cody Becerra RN  Medication Review/Management: medications reviewed  Intervention: Promote Injury-Free Environment  Recent Flowsheet Documentation  Taken 9/2/2024 1000 by Cody Becerra RN  Safety Promotion/Fall Prevention:   activity supervised   clutter free environment maintained   mobility aid in reach     Problem: Pain Acute  Goal: Optimal Pain Control and Function  Outcome: Not Progressing  Intervention: Prevent or Manage Pain  Recent Flowsheet Documentation  Taken 9/2/2024 1000 by Cody Becerra RN  Medication Review/Management: medications reviewed

## 2024-09-02 NOTE — PROGRESS NOTES
Neurosurgery progress note:    Upright x-ray stable:    IMPRESSION: Accentuation of the thoracic spine kyphosis. Redemonstrated compression deformities at T11, L1, and L3. No new compression fractures are identified. Multilevel degenerative endplate changes with marginal osteophytes. Visualized portions of   the lungs are clear. Atherosclerotic calcification of the aortic arch.    RECOMMENDATIONS:  Okay to be out of bed.  No excessive bending, no lifting greater than 5-10 lbs  Our office will arrange follow up in our clinic in 4-6 weeks with lumbar x-ray day of appt.    Suad Caldwell MPAS  [facility]  [address] [address]    Tel [phone]  Pager [phone]

## 2024-09-02 NOTE — PLAN OF CARE
"PRIMARY DIAGNOSIS: AMS  OUTPATIENT/OBSERVATION GOALS TO BE MET BEFORE DISCHARGE:  1. Pain Status: Improved-controlled with oral pain medications.    2. Return to near baseline physical activity: No    3. Cleared for discharge by consultants (if involved): No    Discharge Planner Nurse   Safe discharge environment identified: No  Barriers to discharge: Yes       Entered by: Melissa Taylor RN 09/02/2024 6:22 AM  Patient is Aox1, VSS, not out of bed on this shift, tolerating regular diet needs to be fed, tolerating oral medications crushed in pudding, NS running at 50mL/hr in right arm, PT following, Neuro surg following, SW following for placement, sleeping all of this shift, no pain noted, incontinent of bowel and bladder, repositioned q2, frequent checks for safety, will continue to monitor and provide cares.   Please review provider order for any additional goals.   Nurse to notify provider when observation goals have been met and patient is ready for discharge.  Problem: Adult Inpatient Plan of Care  Goal: Plan of Care Review  Description: The Plan of Care Review/Shift note should be completed every shift.  The Outcome Evaluation is a brief statement about your assessment that the patient is improving, declining, or no change.  This information will be displayed automatically on your shift  note.  Outcome: Progressing  Flowsheets (Taken 9/2/2024 0430)  Outcome Evaluation: Aox1, sleeping all shift, incontinent of bowel and bladder, pills crushed in pudding, no pain noted.  Plan of Care Reviewed With: patient  Overall Patient Progress: no change  Goal: Patient-Specific Goal (Individualized)  Description: You can add care plan individualizations to a care plan. Examples of Individualization might be:  \"Parent requests to be called daily at 9am for status\", \"I have a hard time hearing out of my right ear\", or \"Do not touch me to wake me up as it startles  me\".  Outcome: Progressing  Goal: Absence of " Hospital-Acquired Illness or Injury  Outcome: Progressing  Intervention: Identify and Manage Fall Risk  Recent Flowsheet Documentation  Taken 9/1/2024 1950 by Melissa Taylor RN  Safety Promotion/Fall Prevention:   activity supervised   assistive device/personal items within reach   clutter free environment maintained   mobility aid in reach   lighting adjusted   nonskid shoes/slippers when out of bed   room near nurse's station   room organization consistent   safety round/check completed   supervised activity  Intervention: Prevent Skin Injury  Recent Flowsheet Documentation  Taken 9/1/2024 1950 by Melissa Taylor RN  Body Position: position maintained  Intervention: Prevent and Manage VTE (Venous Thromboembolism) Risk  Recent Flowsheet Documentation  Taken 9/1/2024 1950 by Melissa Taylor RN  VTE Prevention/Management: compression stockings off  Intervention: Prevent Infection  Recent Flowsheet Documentation  Taken 9/2/2024 0352 by Melissa Taylor RN  Infection Prevention: rest/sleep promoted  Taken 9/1/2024 1950 by Melissa Taylor RN  Infection Prevention: single patient room provided  Goal: Optimal Comfort and Wellbeing  Outcome: Progressing  Goal: Readiness for Transition of Care  Outcome: Progressing     Problem: Dementia Signs/Symptoms  Goal: Improved Behavioral Control (Dementia Signs/Symptoms)  Outcome: Progressing  Goal: Improved Mood Symptoms (Dementia Signs/Symptoms)  Outcome: Progressing  Intervention: Optimize Emotion and Mood  Recent Flowsheet Documentation  Taken 9/1/2024 1950 by Melissa Taylor RN  Supportive Measures: active listening utilized  Goal: Optimized Cognitive Function (Dementia Signs/Symptoms)  Outcome: Progressing  Goal: Optimized Oral Intake (Dementia Signs/Symptoms)  Outcome: Progressing  Goal: Improved Sleep (Dementia Signs/Symptoms)  Outcome: Progressing  Goal: Enhanced Social or Functional Skills and Ability (Dementia Signs/Symptoms)  Outcome: Progressing     Problem:  Orthopaedic Fracture  Goal: Absence of Bleeding  Outcome: Progressing  Goal: Bowel Elimination  Outcome: Progressing  Goal: Absence of Embolism Signs and Symptoms  Outcome: Progressing  Intervention: Prevent or Manage Embolism Risk  Recent Flowsheet Documentation  Taken 9/1/2024 1950 by Melissa Taylor RN  VTE Prevention/Management: compression stockings off  Goal: Fracture Stability  Outcome: Progressing  Goal: Optimal Functional Ability  Outcome: Progressing  Intervention: Optimize Functional Ability  Recent Flowsheet Documentation  Taken 9/1/2024 1950 by Melissa Taylor RN  Activity Management: activity adjusted per tolerance  Positioning/Transfer Devices:   pillows   in use  Goal: Absence of Infection Signs and Symptoms  Outcome: Progressing  Goal: Effective Tissue Perfusion  Outcome: Progressing  Goal: Optimal Pain Control and Function  Outcome: Progressing  Goal: Effective Oxygenation and Ventilation  Outcome: Progressing  Intervention: Promote Airway Secretion Clearance  Recent Flowsheet Documentation  Taken 9/1/2024 1950 by Melissa Taylor RN  Cough And Deep Breathing: done with encouragement  Activity Management: activity adjusted per tolerance  Intervention: Optimize Oxygenation and Ventilation  Recent Flowsheet Documentation  Taken 9/1/2024 1950 by Melissa Taylor RN  Head of Bed (HOB) Positioning: HOB at 30-45 degrees     Problem: Acute Kidney Injury/Impairment  Goal: Fluid and Electrolyte Balance  Outcome: Progressing  Goal: Improved Oral Intake  Outcome: Progressing  Goal: Effective Renal Function  Outcome: Progressing  Intervention: Monitor and Support Renal Function  Recent Flowsheet Documentation  Taken 9/1/2024 1950 by Melissa Taylor RN  Medication Review/Management: medications reviewed     Problem: Fall Injury Risk  Goal: Absence of Fall and Fall-Related Injury  Outcome: Progressing  Intervention: Identify and Manage Contributors  Recent Flowsheet Documentation  Taken 9/1/2024 1950 by  Melissa Taylor RN  Medication Review/Management: medications reviewed  Intervention: Promote Injury-Free Environment  Recent Flowsheet Documentation  Taken 9/1/2024 1950 by Melissa Taylor RN  Safety Promotion/Fall Prevention:   activity supervised   assistive device/personal items within reach   clutter free environment maintained   mobility aid in reach   lighting adjusted   nonskid shoes/slippers when out of bed   room near nurse's station   room organization consistent   safety round/check completed   supervised activity     Problem: Pain Acute  Goal: Optimal Pain Control and Function  Outcome: Progressing  Intervention: Prevent or Manage Pain  Recent Flowsheet Documentation  Taken 9/1/2024 1950 by Melissa Taylor RN  Medication Review/Management: medications reviewed  Intervention: Optimize Psychosocial Wellbeing  Recent Flowsheet Documentation  Taken 9/1/2024 1950 by Melissa Taylor RN  Supportive Measures: active listening utilized     Goal Outcome Evaluation:      Plan of Care Reviewed With: patient    Overall Patient Progress: no changeOverall Patient Progress: no change    Outcome Evaluation: Aox1, sleeping all shift, incontinent of bowel and bladder, pills crushed in pudding, no pain noted.

## 2024-09-03 NOTE — PLAN OF CARE
"Goal Outcome Evaluation:      Plan of Care Reviewed With: patient, spouse    Overall Patient Progress: no changeOverall Patient Progress: no change    Outcome Evaluation: Pt lethargic and does not awaken, sleeping in bed, turned and repositioned Q 2 hours and incontinent of urine/stool, did add purewick this afternoon, IVF begun this afternoon, no meds given as swished small amount of water in mouth this am and did not swallow, awaiating pt to awaken more, many bruises on body that seem old due to yellow color,  at bedside and tearful at times      Problem: Adult Inpatient Plan of Care  Goal: Plan of Care Review  Description: The Plan of Care Review/Shift note should be completed every shift.  The Outcome Evaluation is a brief statement about your assessment that the patient is improving, declining, or no change.  This information will be displayed automatically on your shift  note.  Outcome: Not Progressing  Flowsheets (Taken 9/3/2024 1400)  Outcome Evaluation: Pt lethargic and does not awaken, sleeping in bed, turned and repositioned Q 2 hours and incontinent of urine/stool, did add purewick this afternoon, IVF begun this afternoon, no meds given as swished small amount of water in mouth this am and did not swallow, awaiating pt to awaken more, many bruises on body that seem old due to yellow color,  at bedside and tearful at times  Plan of Care Reviewed With:   patient   spouse  Overall Patient Progress: no change  Goal: Patient-Specific Goal (Individualized)  Description: You can add care plan individualizations to a care plan. Examples of Individualization might be:  \"Parent requests to be called daily at 9am for status\", \"I have a hard time hearing out of my right ear\", or \"Do not touch me to wake me up as it startles  me\".  Outcome: Not Progressing  Goal: Absence of Hospital-Acquired Illness or Injury  Outcome: Not Progressing  Intervention: Identify and Manage Fall Risk  Recent Flowsheet " Documentation  Taken 9/3/2024 0936 by Carito Barlow RN  Safety Promotion/Fall Prevention:   activity supervised   clutter free environment maintained   nonskid shoes/slippers when out of bed   room near nurse's station   room organization consistent   safety round/check completed   supervised activity  Intervention: Prevent Skin Injury  Recent Flowsheet Documentation  Taken 9/3/2024 1300 by Carito Barlow RN  Body Position:   turned   heels elevated   legs elevated  Taken 9/3/2024 1120 by Carito Barlow RN  Body Position:   left   turned  Taken 9/3/2024 0900 by Carito Barlow RN  Body Position:   right   turned  Intervention: Prevent and Manage VTE (Venous Thromboembolism) Risk  Recent Flowsheet Documentation  Taken 9/3/2024 0936 by Carito Barlow RN  VTE Prevention/Management: SCDs off (sequential compression devices)  Intervention: Prevent Infection  Recent Flowsheet Documentation  Taken 9/3/2024 0936 by Carito Barlow RN  Infection Prevention: rest/sleep promoted  Goal: Optimal Comfort and Wellbeing  Outcome: Not Progressing  Goal: Readiness for Transition of Care  Outcome: Not Progressing     Problem: Dementia Signs/Symptoms  Goal: Improved Behavioral Control (Dementia Signs/Symptoms)  Outcome: Not Progressing  Goal: Improved Mood Symptoms (Dementia Signs/Symptoms)  Outcome: Not Progressing  Goal: Optimized Cognitive Function (Dementia Signs/Symptoms)  Outcome: Not Progressing  Goal: Optimized Oral Intake (Dementia Signs/Symptoms)  Outcome: Not Progressing  Goal: Improved Sleep (Dementia Signs/Symptoms)  Outcome: Not Progressing  Goal: Enhanced Social or Functional Skills and Ability (Dementia Signs/Symptoms)  Outcome: Not Progressing     Problem: Orthopaedic Fracture  Goal: Absence of Bleeding  Outcome: Not Progressing  Goal: Bowel Elimination  Outcome: Not Progressing  Goal: Absence of Embolism Signs and Symptoms  Outcome: Not Progressing  Intervention: Prevent or Manage  Embolism Risk  Recent Flowsheet Documentation  Taken 9/3/2024 0936 by Carito Barlow RN  VTE Prevention/Management: SCDs off (sequential compression devices)  Goal: Fracture Stability  Outcome: Not Progressing  Goal: Optimal Functional Ability  Outcome: Not Progressing  Intervention: Optimize Functional Ability  Recent Flowsheet Documentation  Taken 9/3/2024 1300 by Carito Barlow RN  Positioning/Transfer Devices:   pillows   in use  Taken 9/3/2024 1120 by Carito Barlow RN  Activity Management:   bedrest   activity adjusted per tolerance  Positioning/Transfer Devices:   pillows   in use  Taken 9/3/2024 0900 by Carito Barlow RN  Activity Management:   bedrest   activity adjusted per tolerance  Positioning/Transfer Devices:   pillows   in use  Goal: Absence of Infection Signs and Symptoms  Outcome: Not Progressing  Intervention: Prevent or Manage Infection  Recent Flowsheet Documentation  Taken 9/3/2024 0900 by Carito Barlow RN  Fever Reduction/Comfort Measures:   aerosol temperature decreased   lightweight bedding  Goal: Effective Tissue Perfusion  Outcome: Not Progressing  Goal: Optimal Pain Control and Function  Outcome: Not Progressing  Goal: Effective Oxygenation and Ventilation  Outcome: Not Progressing  Intervention: Promote Airway Secretion Clearance  Recent Flowsheet Documentation  Taken 9/3/2024 1120 by Carito Barlow RN  Activity Management:   bedrest   activity adjusted per tolerance  Taken 9/3/2024 0936 by Carito Barlow RN  Cough And Deep Breathing: done with encouragement  Taken 9/3/2024 0900 by Carito Barlow RN  Activity Management:   bedrest   activity adjusted per tolerance  Intervention: Optimize Oxygenation and Ventilation  Recent Flowsheet Documentation  Taken 9/3/2024 1300 by Carito Barlow RN  Head of Bed (HOB) Positioning: HOB at 20-30 degrees  Taken 9/3/2024 1120 by Carito Barlow RN  Head of Bed (HOB) Positioning: HOB at 20-30  degrees  Taken 9/3/2024 0900 by Carito Barlow RN  Head of Bed (HOB) Positioning: HOB at 20-30 degrees     Problem: Acute Kidney Injury/Impairment  Goal: Fluid and Electrolyte Balance  Outcome: Not Progressing  Goal: Improved Oral Intake  Outcome: Not Progressing  Goal: Effective Renal Function  Outcome: Not Progressing  Intervention: Monitor and Support Renal Function  Recent Flowsheet Documentation  Taken 9/3/2024 0936 by Carito Barlow RN  Medication Review/Management: medications reviewed     Problem: Fall Injury Risk  Goal: Absence of Fall and Fall-Related Injury  Outcome: Not Progressing  Intervention: Identify and Manage Contributors  Recent Flowsheet Documentation  Taken 9/3/2024 0936 by Carito Barlow RN  Medication Review/Management: medications reviewed  Intervention: Promote Injury-Free Environment  Recent Flowsheet Documentation  Taken 9/3/2024 0936 by Carito Barlow RN  Safety Promotion/Fall Prevention:   activity supervised   clutter free environment maintained   nonskid shoes/slippers when out of bed   room near nurse's station   room organization consistent   safety round/check completed   supervised activity     Problem: Pain Acute  Goal: Optimal Pain Control and Function  Outcome: Not Progressing  Intervention: Prevent or Manage Pain  Recent Flowsheet Documentation  Taken 9/3/2024 0936 by Carito Barlow RN  Medication Review/Management: medications reviewed

## 2024-09-03 NOTE — PLAN OF CARE
"PRIMARY DIAGNOSIS: AMS  OUTPATIENT/OBSERVATION GOALS TO BE MET BEFORE DISCHARGE:  ADLs back to baseline: No    Activity and level of assistance: Up with maximum assistance. Consider SW and/or PT evaluation.     Pain status: Improved-controlled with oral pain medications.    Return to near baseline physical activity: No     Discharge Planner Nurse   Safe discharge environment identified: No  Barriers to discharge: Yes       Entered by: Melissa Taylor RN 09/03/2024 4:15 AM  Patient is disoriented x4, VSS, not out of bed on this shift, tolerating regular diet needs to be fed, tolerating oral medications crushed in pudding, PT following, Neuro surg following, needs to follow up outpatient, SW following for placement, sleeping all of this shift, no pain noted, incontinent of bowel and bladder, repositioned q2, frequent checks for safety, will continue to monitor and provide cares.   Please review provider order for any additional goals.   Nurse to notify provider when observation goals have been met and patient is ready for discharge.  Problem: Adult Inpatient Plan of Care  Goal: Plan of Care Review  Description: The Plan of Care Review/Shift note should be completed every shift.  The Outcome Evaluation is a brief statement about your assessment that the patient is improving, declining, or no change.  This information will be displayed automatically on your shift  note.  Outcome: Progressing  Flowsheets (Taken 9/3/2024 0414)  Outcome Evaluation: Disoiented x4, been sleeping all shift, 3 incontinent episodes of bowel  Plan of Care Reviewed With: patient  Overall Patient Progress: no change  Goal: Patient-Specific Goal (Individualized)  Description: You can add care plan individualizations to a care plan. Examples of Individualization might be:  \"Parent requests to be called daily at 9am for status\", \"I have a hard time hearing out of my right ear\", or \"Do not touch me to wake me up as it startles  me\".  Outcome: " Progressing  Goal: Absence of Hospital-Acquired Illness or Injury  Outcome: Progressing  Intervention: Identify and Manage Fall Risk  Recent Flowsheet Documentation  Taken 9/2/2024 2054 by Melissa Taylor RN  Safety Promotion/Fall Prevention:   activity supervised   clutter free environment maintained   nonskid shoes/slippers when out of bed   room near nurse's station   room organization consistent   safety round/check completed   supervised activity  Intervention: Prevent Skin Injury  Recent Flowsheet Documentation  Taken 9/2/2024 2054 by Melissa Taylor RN  Body Position:   left   turned  Intervention: Prevent and Manage VTE (Venous Thromboembolism) Risk  Recent Flowsheet Documentation  Taken 9/2/2024 2054 by Melissa Taylor RN  VTE Prevention/Management: SCDs off (sequential compression devices)  Intervention: Prevent Infection  Recent Flowsheet Documentation  Taken 9/3/2024 0015 by Melissa Taylor RN  Infection Prevention: rest/sleep promoted  Taken 9/2/2024 2054 by Melissa Taylor RN  Infection Prevention: rest/sleep promoted  Goal: Optimal Comfort and Wellbeing  Outcome: Progressing  Goal: Readiness for Transition of Care  Outcome: Progressing     Problem: Dementia Signs/Symptoms  Goal: Improved Behavioral Control (Dementia Signs/Symptoms)  Outcome: Progressing  Goal: Improved Mood Symptoms (Dementia Signs/Symptoms)  Outcome: Progressing  Goal: Optimized Cognitive Function (Dementia Signs/Symptoms)  Outcome: Progressing  Goal: Optimized Oral Intake (Dementia Signs/Symptoms)  Outcome: Progressing  Goal: Improved Sleep (Dementia Signs/Symptoms)  Outcome: Progressing  Goal: Enhanced Social or Functional Skills and Ability (Dementia Signs/Symptoms)  Outcome: Progressing     Problem: Orthopaedic Fracture  Goal: Absence of Bleeding  Outcome: Progressing  Goal: Bowel Elimination  Outcome: Progressing  Goal: Absence of Embolism Signs and Symptoms  Outcome: Progressing  Intervention: Prevent or Manage  Embolism Risk  Recent Flowsheet Documentation  Taken 9/2/2024 2054 by Melissa Taylor RN  VTE Prevention/Management: SCDs off (sequential compression devices)  Goal: Fracture Stability  Outcome: Progressing  Goal: Optimal Functional Ability  Outcome: Progressing  Intervention: Optimize Functional Ability  Recent Flowsheet Documentation  Taken 9/3/2024 0353 by Melissa Taylor RN  Activity Management: bedrest  Taken 9/2/2024 2054 by Melissa Taylor RN  Activity Management: activity adjusted per tolerance  Positioning/Transfer Devices:   pillows   in use  Goal: Absence of Infection Signs and Symptoms  Outcome: Progressing  Goal: Effective Tissue Perfusion  Outcome: Progressing  Goal: Optimal Pain Control and Function  Outcome: Progressing  Goal: Effective Oxygenation and Ventilation  Outcome: Progressing  Intervention: Promote Airway Secretion Clearance  Recent Flowsheet Documentation  Taken 9/3/2024 0353 by Melissa Taylor RN  Activity Management: bedrest  Taken 9/2/2024 2054 by Melissa Taylor RN  Cough And Deep Breathing: done with encouragement  Activity Management: activity adjusted per tolerance  Intervention: Optimize Oxygenation and Ventilation  Recent Flowsheet Documentation  Taken 9/2/2024 2054 by Melissa Taylor RN  Head of Bed (HOB) Positioning: HOB at 20-30 degrees     Problem: Acute Kidney Injury/Impairment  Goal: Fluid and Electrolyte Balance  Outcome: Progressing  Goal: Improved Oral Intake  Outcome: Progressing  Goal: Effective Renal Function  Outcome: Progressing  Intervention: Monitor and Support Renal Function  Recent Flowsheet Documentation  Taken 9/2/2024 2054 by Melissa Taylor RN  Medication Review/Management: medications reviewed     Problem: Fall Injury Risk  Goal: Absence of Fall and Fall-Related Injury  Outcome: Progressing  Intervention: Identify and Manage Contributors  Recent Flowsheet Documentation  Taken 9/2/2024 2054 by Melissa Taylor RN  Medication Review/Management:  medications reviewed  Intervention: Promote Injury-Free Environment  Recent Flowsheet Documentation  Taken 9/2/2024 2054 by Melissa Taylor RN  Safety Promotion/Fall Prevention:   activity supervised   clutter free environment maintained   nonskid shoes/slippers when out of bed   room near nurse's station   room organization consistent   safety round/check completed   supervised activity     Problem: Pain Acute  Goal: Optimal Pain Control and Function  Outcome: Progressing  Intervention: Prevent or Manage Pain  Recent Flowsheet Documentation  Taken 9/2/2024 2054 by Melissa Taylor RN  Medication Review/Management: medications reviewed     Goal Outcome Evaluation:      Plan of Care Reviewed With: patient    Overall Patient Progress: no changeOverall Patient Progress: no change    Outcome Evaluation: Disoiented x4, been sleeping all shift, 3 incontinent episodes of bowel

## 2024-09-03 NOTE — CONSULTS
CLINICAL NUTRITION SERVICES  -  ASSESSMENT NOTE      Recommendations Ordered by Registered Dietitian (RD):   Encourage oral intake as able/tolerated.   MALNUTRITION:  % Weight Loss:  Weight loss does not meet criteria for malnutrition   % Intake:  </= 50% for >/= 5 days (severe malnutrition)  Subcutaneous Fat Loss:  Unable to perform NFPE at this time.  Muscle Loss:  Unable to perform NFPE at this time.  Fluid Retention:  Unable to perform NFPE at this time.    Malnutrition Diagnosis: Unable to determine due to pt confusion/disorientation. Will revisit tomorrow when  is here.          REASON FOR ASSESSMENT  Elsa Jackson is a 74 year old female seen by Registered Dietitian for Admission Nutrition Risk Screen for positive MST screen. (Per EMR, pt's  reported poor eating due to decreased appetite and unsure amount of weight loss)    PMH: Lewy body dementia, CKD II(mild), hypothyroidism, L1 compression fracture    Per EMR, pt presented to ED on 8/30/24 for increased confusion and increased weakness. Per EMR, pt was unable to provide a meaningful, reliable hx as they were actively hallucinating taking to someone who was not present. For this reason, hx has been obtained from her . Per , pt has been found lying down on the floor at different parts of the house incontinent of urine. Pt's dementia has been worsening over the last several years.     NUTRITION HISTORY  RD attempted to meet with pt and pt's spouse to discuss intake/appetite concerns. Pt was asleep and spouse not in the pt's room.  RD will follow up tomorrow.  Per RN, pt continues to have very low intake and is often too confused to eat anything.      CURRENT NUTRITION ORDERS  Diet Order:     Regular       Current Intake/Tolerance:  Per EMR, pt's appetite ranges from poor-fair (one instance of Good) and intakes range from 25-50% (one instance of good)      NUTRITION FOCUSED PHYSICAL ASSESSMENT FOR DIAGNOSING MALNUTRITION)  No:  Pt  is asleep as well as experiencing persistent confusion  Awaiting assessment when pt is appropriate to be evaluated.               Obtained from Chart/Interdisciplinary Team:  No current documentation of edema, ascites, pressure wounds, or non-healing wounds.    ANTHROPOMETRICS  Height: 5'7   Weight: 55.3 kg (122 lb) - This weight has been taken from Care Everywhere EMR 8/20/2024 Family Medicine Office Visit via [facility]. It is the most recent documented weight found at this time.  BMI: 19.1 (Using weight noted above)  Weight Status:  Normal BMI  Weight History:   Wt Readings from Last 10 Encounters:   04/22/24 58.6 kg (129 lb 1.6 oz)   01/10/23 59 kg (130 lb)     08/20/24:  55.3 kg (122 lb) (Care Everywhere)  07/25/24:  56.7 kg (125 lb) (Care Everywhere)        LABS  Labs reviewed  Noted: high Cl(108), low Ca(8.7), high glc(123)    MEDICATIONS  Medications reviewed      ASSESSED NUTRITION NEEDS PER APPROVED PRACTICE GUIDELINES:    Dosing Weight 54.6 kg  Estimated Energy Needs: 2376-4876 kcals (25-30 Kcal/Kg)  Justification: repletion  Estimated Protein Needs: 43-55 grams protein (0.8-1 g pro/Kg)  Justification: Repletion, preservation of lean body mass, CKD  Estimated Fluid Needs: Justification: per provider pending fluid status      NUTRITION DIAGNOSIS:  No nutrition diagnosis identified at this time.  RD will follow up tomorrow.      NUTRITION INTERVENTIONS  Recommendations / Nutrition Prescription  Encourage oral intake as able/tolerated.  .      Implementation  Nutrition education: Not appropriate at this time due to patient condition  .      Nutrition Goals  Meals TID  Intake of >75% of meals   .      MONITORING AND EVALUATION:  RD will continue to follow and will follow up when pt is awake/pt's spouse is present.  Progress towards goals will be monitored and evaluated per protocol and Practice Guidelines        Johanna Chilel, RD, LD  Clinical Dietitian  3rd Floor/ICU: [phone]  All Other Floors:  [phone]  Weekends/Holiday: [phone]  Office: [phone]

## 2024-09-03 NOTE — PROGRESS NOTES
Care Management Follow Up    Length of Stay (days): 3    Expected Discharge Date: 09/05/2024     Concerns to be Addressed: discharge planning     Patient plan of care discussed at interdisciplinary rounds: Yes    Anticipated Discharge Disposition: tbd     Anticipated Discharge Services:   Anticipated Discharge DME:     Referrals Placed by CM/SW:  none at this time  Private pay costs discussed: Not applicable    Discussed  Partnership in Safe Discharge Planning  document with patient/family: No     Handoff Completed: No, handoff not indicated or clinically appropriate    Additional Information:  SW following for discharge planning.     Updated by palliative care provider that GOC are restorative.    Awaiting PT assessment when pt is appropriate to be evaluated. Will also need OT eval as pt has [employer]vantage and will require a prior auth if TCU is recommended.     SW attempted to meet with pt and pt's spouse to touch base on discharge planning. Pt was asleep and spouse not in the pt's room.     Social work will continue to follow and assist with discharge planning as needed.    Next Steps: await PT eval     REYNOLD Mckenna, LSW  Care Coordination  [phone]    REYNOLD Howell

## 2024-09-03 NOTE — CONSULTS
"  Palliative Care Consultation Note  [facility]      Patient: Elsa Jackson  Date of Admission:  8/30/2024    Requesting Clinician / Team: Kolby Loera MD /Hospitalist  Reason for consult: Goals of care  Patient and family support       Recommendations & Counseling     GOALS OF CARE:   Life-prolonging with limits   Maykel has a good understanding of current hospitalization, hopeful that there is a reversible cause for current delirium.  At Kyle request, we discussed trajectory of decline in dementia and what to expect in advanced stages.    ADVANCE CARE PLANNING:  No health care directive on file. Per system policy, Surrogate Decision-makers for Patients With Diminished Decision-making Capacity offers guidance on possible decision-makers. Maykel notes there is a directive at home where he is named as HCA.  He will bring in copy.  New POLST form completed today.   Code status: No CPR- Do NOT Intubate    MEDICAL MANAGEMENT:   We are not actively managing symptoms at this time.    PSYCHOSOCIAL/SPIRITUAL SUPPORT:  Family Maykel and Elsa have 3 children, 2 from Kyle previous relationship that are estranged.  Maykel is now in communication with his 3rd son in the [employer] daily for support with Elsa's changing condition.  Friends Kyle has neighbors who have been a support to him, one in health care who has been trained in dementia.    Maykel and Elsa have been involved in Dementia Support Group at [facility], but unable to go recently.  Leonie community: Jewish   Maykel states they would want a \" for last rights\" some day, but declines  services now.   Maykel is teary during our conversation and notes he has had times of deep depression related to Elsa's illness.  He has had thoughts and plans of suicide in the past, but states he is not thinking of a plan now.  [facility] and agrees he would call if suicidal thoughts returned.   I recommended " "re-connecting with his mental health support now as there are likely to be ups and downs to come in the future and it would likely beneift him to have the support in place.      Thank you for the consult and allowing us to aid in the care of Elsa Jackson.  We will follow along peripherally.  Please call if new needs arise.     These recommendations have been discussed with Sid Brown MD and JESSENIA.    LEONARDA Castro CNP  [facility], Palliative Care  Securely message with the Vocera Web Console (learn more here) or  Text page via [employer] Paging/Directory         Assessment      Elsa Jackson is a 74 year old female with a past medical history of Lewy body dementia, CKD, hypothyroidism, L1 compression fracture who presented on 8/30  with increasing falls, aggressive and disorganized behavior.   She is found to have new compression deformity of T11. Today she is somnolent with noted fevers overnight.     Today, the patient was seen for:  Patient and family support in dementia and for caregiver  ACP      History of Present Illness   Met with Maykel at bedside, Elsa is unable to participate.   Introduced the role of palliative care as an interdisciplinary team that cares for patients with serious illness to help support symptom management, communication, coping for patients and their families as well as support with medical decision making.  Maykel shares the story of meeting Elsa over 40 years ago and the challenges he has faced as a caregiver over the past three years.  He has felt frustrated that neurology has not been able to tell him \"were she is with the dementia.\"      Prognosis, Goals, & Planning:   Functional Status just prior to this current hospitalization:  ECOG3 (Capable of only limited self-care; needs help with ADLs; in bed/chair >50% of waking hours)    Prognosis, Goals, and/or Advance Care Planning:  We reviewed the common trajectory of decline with each acute medical episode and what things Maykel " might see to alert him that Elsa is entering the more advanced stages of dementia. We discussed common complications including recurrent infection, falls and dysphagia.    We discussed dementia as a life limiting illness and when in hospice might be appropriate.  Maykel is considering a higher level of care for Elsa.  We discussed the benefits of letting professionals manage care so he can focus on being present with Elsa as her care needs increase.  He will work with SW and therapy to determine where or when.    Code Status was addressed today:   Yes, We discussed potential risks and rationale of attempting cardiac resuscitation, intubation, and mechanical ventilation.  We also discussed probability of survival as well as quality of life implications.  Maykel confirms DNR/DNI    Patient's decision making preferences: unable to assess        Patient has decision-making capacity today for complex decisions: Unreliable          Coping, Meaning, & Spirituality:   Mood, coping, and/or meaning in the context of serious illness were addressed today: Yes  Maykel notes Elsa is a lifelong [employer].    He himself is prone to depression related to Elsa's condition and sought care for suicidal ideation and plan when she was first diagnosed. He is worried about this again, but is not planning suicide currently.  [facility] and agrees to call if he does find himself contemplating a suicide plan.  We discussed getting connected either way as he is likely to experience some ups and downs with her condition as things progress   Maykel also identifies support in his Son, his neighbors and a Dementia Support Group he and Elsa have attended.    Social:   Living situation:lives with significant other/spouse  Important relationships/caregivers:Maykel is caregiver.  They have one son together who is out of state in [facility].  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   Areas of fulfillment/romaine: For Maykel, many hobbies including model airplanes and boats.  Elsa has been unable.    Medications:  Reviewed this patient's medication profile and medications from this hospitalization. Notable medications:    PTA: tylenol PRN, Aricept, Synthroid, Zyprexa and venlafaxine.   Has received dilaudid x1 on 8/30 and Ativan x1 on 8/31.  [place] Board of Pharmacy Data Base Reviewed: Yes:   reviewed - no record of controlled substances prescribed.    ROS:  Comprehensive ROS is reviewed and is negative except as here & per HPI:     Physical Exam   Vital Signs with Ranges  Temp:  [97  F (36.1  C)-100.8  F (38.2  C)] 99.1  F (37.3  C)  Pulse:  [72-80] 72  Resp:  [16-18] 16  BP: (107-161)/() 107/70  SpO2:  [93 %-99 %] 95 %  Wt Readings from Last 10 Encounters:   04/22/24 58.6 kg (129 lb 1.6 oz)   01/10/23 59 kg (130 lb)     0 lbs 0 oz    PHYSICAL EXAM:  GEN:  somnolent, calm, NAD.  HEENT:  Normocephalic/atraumatic, no scleral icterus, no nasal discharge, mouth dry.  LUNGS:  Non labored breathing. Symmetric chest rise on inhalation noted.  EXT:  No edema or cyanosis.  GUILLEN  SKIN:  Dry to touch, no exanthems noted in the visualized areas.     Data reviewed:  Recent Labs   Lab 09/03/24  1023 09/02/24  1731 09/02/24  0557 08/31/24  0820 08/30/24  1521 08/30/24  1456   WBC 8.8 8.3  --   --   --  5.4   HGB 9.3* 9.9*  --   --   --  11.0*   MCV 94 94  --   --   --  95    191 196  --   --  174   INR  --   --   --   --  0.98  --    NA  --  140  --  142  --  137   POTASSIUM  --  3.8  --  4.3  --  4.2   CHLORIDE  --  108*  --  108*  --  101   CO2  --  22  --  23  --  22   BUN  --  18.0  --  23.3*  --  31.0*   CR  --  0.93 0.85 0.86 1.24* 1.26*   ANIONGAP  --  10  --  11  --  14   MYNOR  --  8.7*  --  8.9  --  10.0   GLC  --  123*  --  79  --  108*   ALBUMIN  --   --   --   --  4.1  --    PROTTOTAL  --   --   --   --  8.0  --    BILITOTAL  --   --   --   --   1.1  --    ALKPHOS  --   --   --   --  85  --    ALT  --   --   --   --  19  --    AST  --   --   --   --  31  --      Narrative & Impression   CT ABDOMEN PELVIS WITH CONTRAST 8/30/2024 4:20 PM     CLINICAL HISTORY: Abdominal pain. Confusion.      TECHNIQUE: CT scan of the abdomen and pelvis was performed following  injection of IV contrast. Multiplanar reformats were obtained. Dose  reduction techniques were used.  CONTRAST: 65 mL Isovue-370     COMPARISON: None.     FINDINGS: Limited by motion artifact throughout much of the study.     LOWER CHEST: No infiltrates or effusions.     HEPATOBILIARY: No significant mass or bile duct dilatation. No  calcified gallstones.      PANCREAS: No significant mass, duct dilatation, or inflammatory  change.     SPLEEN: Normal size.     ADRENAL GLANDS: No significant nodules.     KIDNEYS/BLADDER: No significant mass, stones, or hydronephrosis.     BOWEL: Diverticulosis in the colon. No acute inflammatory change. No  obstruction. Unremarkable appendix.     PELVIC ORGANS: No pelvic masses. Uterine prolapse.     ADDITIONAL FINDINGS: There are moderate atherosclerotic changes of the  visualized aorta and its branches. There is no evidence of aortic  dissection or aneurysm. No adenopathy or free fluid.     MUSCULOSKELETAL: Progressive compression deformity of L1. Stable  compression deformity of L3. New compression deformity of T11 that  appears acute.                                                                      IMPRESSION:   1.  Acute appearing compression deformity of T11 with mild loss of  height. No definite retropulsed fragments.  2.  Progressive compression deformity of L1 with similar posterior  displacement of fracture fragments.  3.  Stable compression deformity of L3.  4.  No acute process within the abdomen and pelvis.     MADELYN MONTALVO MD        Medical Decision Making       Please see A&P for additional details of medical decision making.  MANAGEMENT DISCUSSED with  the following over the past 24 hours: Hospitalist and SW   NOTE(S)/MEDICAL RECORDS REVIEWED over the past 24 hours: Hospitalist,neurosurgery, nursing   Tests REVIEWED in the past 24 hours:  - See lab/imaging results included in the data section of the note  SUPPLEMENTAL HISTORY, in addition to the patient's history, over the past 24 hours obtained from:   - Spouse or significant other      Advance Care Planning Discussion 9/3/2024. Trisha MCDERMOTT APRN CNP met with Patient and their spouse today at the hospital to discuss Advance Care Planning. Elsa Jackson does not have decisional capacity  and was present for this discussion.  Those present were informed of the voluntary nature of this discussion and wished to proceed.  The discussion included:  education about progression in dementia, prognosis and prognostic signs, Code Status, nutrition and POLST . This discussion began at 1035 and ended at 1110 for a total of 35 minutes.

## 2024-09-03 NOTE — PROGRESS NOTES
[facility]    Medicine Progress Note - Hospitalist Service    Date of Admission:  8/30/2024    Assessment & Plan     74-year-old female with history of Lewy body dementia who lives with her  who is her primary caregiver.  She was brought to the ER because of increasing confusion.  Patient has been found lying down on the floor at different parts of the house incontinent of urine.  She has been recently treated with antibiotics for UTI.  The patient's dementia has been worsening over the last several years, and now likely requires a higher level of support.      Now sedated    Sedation    - received oral dilaudid 1mg PO on 8/31    - received Ativan 0.5mg x 1 at noon on 9/1    - otherwise it does not appear she received any other sedating meds    - stopped all potentially sedating meds    - will have to wait for patient to become more arousable to re-assess dispo/needs    Fever    - had mild fever 100.8    - so far infectious work-up negative (see above)    - obtained blood culture    - hold tylenol so we can assess for fever    Lewy body dementia  Acute confusional state  Dementia with behavioral abnormality  Probable hallucinations    - patient is unable to give any history and has dementia/confusion at baseline    - infectious work-up on admission negative: UA neg, respiratory panel neg, stool enteric panel negative, CT scan chest negative for infection, no respiratory symptoms    - takes olanzapine 2.5 mg by mouth 2 times daily (hold for sedation)    - melatonin at night for sleeping    - [facility] by [facility]    - cont aricept     JANETTE    - improved    - IVF to start today due to sedation    Acute/subacute moderate T11 compression fracture  Chronic severe L1 compression   Chronic L3 superior endplate compression fracture    - Neurosurgery consulted, MRI with re-demonstration of above abnormalities    - ok to be out of bed, no excessive bending or lifting greater than 5-10 lbs,  follow up will be arranged by neurosurgery     Hypothyroidism    - continue levothyroxine 112 mcg daily      in room  Questions answered     Diet: Regular Diet Adult    DVT Prophylaxis: Pneumatic Compression Devices  Borjas Catheter: Not present  Lines: None     Cardiac Monitoring: None  Code Status: No CPR- Do NOT Intubate      Clinically Significant Risk Factors                                # Financial/Environmental Concerns: none               Disposition Plan     Expected Discharge Date: 09/03/2024      Destination: assisted living            Sid Brown MD  Hospitalist Service  [facility]  Securely message with InStaff (more info)  Text page via [employer] Paging/Directory   ______________________________________________________________________    Interval History   Patient sleeping. Vitals normal. Does not arouse    Physical Exam   Vital Signs: Temp: 99.1  F (37.3  C) Temp src: Axillary BP: 107/70 Pulse: 72   Resp: 16 SpO2: 95 % O2 Device: None (Room air)    Weight: 120 lbs 5.94 oz    General: sleeping  Respiratory: Normal work of breathing    Medical Decision Making       35 MINUTES SPENT BY ME on the date of service doing chart review, history, exam, documentation & further activities per the note.        Data     I have personally reviewed the following data over the past 24 hrs:    8.8  \   9.3 (L)   / 182     140 108 (H) 18.0 /  123 (H)   3.8 22 0.93 \       Imaging results reviewed over the past 24 hrs:   No results found for this or any previous visit (from the past 24 hour(s)).

## 2024-09-04 NOTE — PROGRESS NOTES
Care Management Discharge Note    Discharge Date: 09/05/2024     [facility]    Discharge Transportation: [facility]    Private pay costs discussed: insurance costs out of pocket expenses    Does the patient's insurance plan have a 3 day qualifying hospital stay waiver?  No    PAS Confirmation Code: Needed  Patient/family educated on Medicare website which has current facility and service quality ratings: yes    Education Provided on the Discharge Plan: Yes  Persons Notified of Discharge Plans: [facility]  Patient/Family in Agreement with the Plan: Yes    Handoff Referral Completed: No, handoff not indicated or clinically appropriate    Additional Information:  Plan for [facility] LTC, tomorrow. Updated patient and spouse who are agreeable to the plan. Patient will start under SNF TCU benefits and trial therapy. Spouse to pay $5000 deposit upon admission as pt will transition to LTC. PAS needed. Spouse anticipates he can transport. He plans to tour [facility] this afternoon.     With pt and spouse permission, SW returned call to pt's sister Hillary (resides in [place]), [phone]. Updated on plan to discharge to LTC. She has concerns about spouse transporting and asked that [facility] transport be arranged. She also has concerns about spouse and him retaining details about the discharge plan. She can be reached if there are any questions or concerns with discharge tomorrow. She also provided contact info for pt's son Thee ([phone]) if assistance with deposit is needed.     [facility] transport arranged for [phone] 9/5. Facility updated. Will discuss with spouse 9/5.    SW will continue to follow.     AZALIA Caro, [facility]   Inpatient Care Coordination  [facility]   [phone]

## 2024-09-04 NOTE — PLAN OF CARE
"  Problem: Adult Inpatient Plan of Care  Goal: Plan of Care Review  Description: The Plan of Care Review/Shift note should be completed every shift.  The Outcome Evaluation is a brief statement about your assessment that the patient is improving, declining, or no change.  This information will be displayed automatically on your shift  note.  9/4/2024 1822 by Gwen Brown RN  Outcome: Progressing  9/4/2024 1713 by Gwen Brown RN  Outcome: Progressing  Flowsheets (Taken 9/4/2024 1713)  Outcome Evaluation: A&O to self, ambulated to bathroom ax2, tolerating regular diet, denies pain  Plan of Care Reviewed With:   patient   spouse  Overall Patient Progress: no change  Goal: Patient-Specific Goal (Individualized)  Description: You can add care plan individualizations to a care plan. Examples of Individualization might be:  \"Parent requests to be called daily at 9am for status\", \"I have a hard time hearing out of my right ear\", or \"Do not touch me to wake me up as it startles  me\".  9/4/2024 1822 by Gwen Brown RN  Outcome: Progressing  9/4/2024 1713 by Gwen Brown RN  Outcome: Progressing  Goal: Absence of Hospital-Acquired Illness or Injury  9/4/2024 1822 by Gwen Brown RN  Outcome: Progressing  9/4/2024 1713 by Gwen Brown RN  Outcome: Progressing  Intervention: Identify and Manage Fall Risk  Recent Flowsheet Documentation  Taken 9/4/2024 1400 by Gwen Brown RN  Safety Promotion/Fall Prevention: activity supervised  Goal: Optimal Comfort and Wellbeing  9/4/2024 1822 by Gwen Brown RN  Outcome: Progressing  9/4/2024 1713 by Gwen Brown RN  Outcome: Progressing  Goal: Readiness for Transition of Care  9/4/2024 1822 by Gwen Brown RN  Outcome: Progressing  9/4/2024 1713 by Gwen Brown RN  Outcome: Progressing     Problem: Dementia Signs/Symptoms  Goal: Improved Behavioral Control (Dementia Signs/Symptoms)  9/4/2024 1822 by Gwen Brown RN  Outcome: Progressing  9/4/2024 1713 by " Young, Gwen, RN  Outcome: Progressing  Goal: Improved Mood Symptoms (Dementia Signs/Symptoms)  9/4/2024 1822 by Gwen Brown RN  Outcome: Progressing  9/4/2024 1713 by Gwen Brown RN  Outcome: Progressing  Goal: Optimized Cognitive Function (Dementia Signs/Symptoms)  9/4/2024 1822 by Gwen Brown RN  Outcome: Progressing  9/4/2024 1713 by Gwen Brown RN  Outcome: Progressing  Goal: Optimized Oral Intake (Dementia Signs/Symptoms)  9/4/2024 1822 by Gwen Brown RN  Outcome: Progressing  9/4/2024 1713 by Gwen Brown RN  Outcome: Progressing  Goal: Improved Sleep (Dementia Signs/Symptoms)  9/4/2024 1822 by Gwen Brown RN  Outcome: Progressing  9/4/2024 1713 by Gwen Brown RN  Outcome: Progressing  Goal: Enhanced Social or Functional Skills and Ability (Dementia Signs/Symptoms)  9/4/2024 1822 by Gwen Brown RN  Outcome: Progressing  9/4/2024 1713 by Gwen Brown RN  Outcome: Progressing     Problem: Orthopaedic Fracture  Goal: Absence of Bleeding  9/4/2024 1822 by Gwen Brown RN  Outcome: Progressing  9/4/2024 1713 by Gwen Brown RN  Outcome: Progressing  Goal: Bowel Elimination  9/4/2024 1822 by Gwen Brown RN  Outcome: Progressing  9/4/2024 1713 by Gwen Brown RN  Outcome: Progressing  Goal: Absence of Embolism Signs and Symptoms  9/4/2024 1822 by Gwen Brown RN  Outcome: Progressing  9/4/2024 1713 by Gwen Brown RN  Outcome: Progressing  Goal: Fracture Stability  9/4/2024 1822 by Gwen Brown RN  Outcome: Progressing  9/4/2024 1713 by Gwen Brown RN  Outcome: Progressing  Goal: Optimal Functional Ability  9/4/2024 1822 by Gwen Brown RN  Outcome: Progressing  9/4/2024 1713 by Gwen Brown RN  Outcome: Progressing  Goal: Absence of Infection Signs and Symptoms  9/4/2024 1822 by Young, Gwen, RN  Outcome: Progressing  9/4/2024 1713 by Gwen Brown RN  Outcome: Progressing  Goal: Effective Tissue Perfusion  9/4/2024 1822 by Gwen Brown,  RN  Outcome: Progressing  9/4/2024 1713 by Gwne Brown RN  Outcome: Progressing  Goal: Optimal Pain Control and Function  9/4/2024 1822 by Gwen Brown RN  Outcome: Progressing  9/4/2024 1713 by Gwen Brown RN  Outcome: Progressing  Goal: Effective Oxygenation and Ventilation  9/4/2024 1822 by Gwen Brown RN  Outcome: Progressing  9/4/2024 1713 by Gwen Brown RN  Outcome: Progressing     Problem: Acute Kidney Injury/Impairment  Goal: Fluid and Electrolyte Balance  9/4/2024 1822 by Gwen Brown RN  Outcome: Progressing  9/4/2024 1713 by Gwen Brown RN  Outcome: Progressing  Goal: Improved Oral Intake  9/4/2024 1822 by Gwen Brown RN  Outcome: Progressing  9/4/2024 1713 by Gwen Brown RN  Outcome: Progressing  Goal: Effective Renal Function  9/4/2024 1822 by Gwen Brown RN  Outcome: Progressing  9/4/2024 1713 by Gwen Brown RN  Outcome: Progressing     Problem: Fall Injury Risk  Goal: Absence of Fall and Fall-Related Injury  9/4/2024 1822 by Gwen Brown RN  Outcome: Progressing  9/4/2024 1713 by Gwen Brown RN  Outcome: Progressing  Intervention: Promote Injury-Free Environment  Recent Flowsheet Documentation  Taken 9/4/2024 1400 by Gwen Brown RN  Safety Promotion/Fall Prevention: activity supervised      Goal Outcome Evaluation:      Plan of Care Reviewed With: patient, spouse    Overall Patient Progress: no changeOverall Patient Progress: no change    Outcome Evaluation: A&O to self, ambulated to bathroom ax2, tolerating regular diet, denies pain

## 2024-09-04 NOTE — PROGRESS NOTES
[facility]    Medicine Progress Note - Hospitalist Service    Date of Admission:  8/30/2024    Assessment & Plan     74-year-old female with history of Lewy body dementia who lives with her  who is her primary caregiver.  She was brought to the ER because of increasing confusion.  Patient has been found lying down on the floor at different parts of the house incontinent of urine.  She has been recently treated with antibiotics for UTI.  The patient's dementia has been worsening over the last several years, and now likely requires a higher level of support.      Was sedated on 9/3, likely from ativan  Now awake  Likely will need to pursue LTC/memory care    Sedation    - received oral dilaudid 1mg PO on 8/31    - received Ativan 0.5mg x 1 at noon on 9/1    - otherwise it does not appear she received any other sedating meds    - stopped all potentially sedating meds    - resolved, now at her baseline    Fever    - had mild fever 100.8    - so far infectious work-up negative (see above)    - obtained blood culture, so far negative    - resolved    Lewy body dementia  Acute confusional state  Dementia with behavioral abnormality  Probable hallucinations    - patient is unable to give any history and has dementia/confusion at baseline    - infectious work-up on admission negative: UA neg, respiratory panel neg, stool enteric panel negative, CT scan chest negative for infection, no respiratory symptoms    - takes olanzapine 2.5 mg by mouth 2 times daily (hold for sedation)    - melatonin at night for sleeping    - patient seen by [facility]    - cont aricept     JANETTE    - improved    - IVF- ok to stop as patient now awake and eating well    Acute/subacute moderate T11 compression fracture  Chronic severe L1 compression   Chronic L3 superior endplate compression fracture    - Neurosurgery consulted, MRI with re-demonstration of above abnormalities    - ok to be out of bed, no excessive bending  or lifting greater than 5-10 lbs, follow up will be arranged by neurosurgery     Hypothyroidism    - continue levothyroxine 112 mcg daily      in room  Questions answered     Diet: Regular Diet Adult  Snacks/Supplements Adult: Ensure Enlive; Between Meals    DVT Prophylaxis: Pneumatic Compression Devices  Borjas Catheter: Not present  Lines: None     Cardiac Monitoring: None  Code Status: No CPR- Do NOT Intubate      Clinically Significant Risk Factors                             # Moderate Malnutrition: based on nutrition assessment, PRESENT ON ADMISSION   # Financial/Environmental Concerns: none      Disposition Plan     Expected Discharge Date: 09/05/2024      Destination: assisted living            Sid Brown MD  Hospitalist Service  [facility]  Securely message with [employer] (more info)  Text page via [employer] Paging/Directory   ______________________________________________________________________    Interval History   Patient awake today. At her baseline confusion per .  Denies chest pain, sob, abdo pain, n/v    Physical Exam   Vital Signs: Temp: 98.3  F (36.8  C) Temp src: Axillary BP: (!) 167/95 Pulse: 69   Resp: 16 SpO2: 96 % O2 Device: None (Room air)    Weight: 120 lbs 5.94 oz    General: awake, alert, confused  Respiratory: Normal work of breathing, clear    Medical Decision Making       35 MINUTES SPENT BY ME on the date of service doing chart review, history, exam, documentation & further activities per the note.        Data         Imaging results reviewed over the past 24 hrs:   No results found for this or any previous visit (from the past 24 hour(s)).

## 2024-09-04 NOTE — PLAN OF CARE
"Pt is A&O to self. Pt is calm and cooperative. Scheduled Zyprexa held per husbands request. Incontinent of bowel and bladder. Blood cultures pending. Consults: PT.    Problem: Adult Inpatient Plan of Care  Goal: Plan of Care Review  Description: The Plan of Care Review/Shift note should be completed every shift.  The Outcome Evaluation is a brief statement about your assessment that the patient is improving, declining, or no change.  This information will be displayed automatically on your shift  note.  Outcome: Not Progressing  Flowsheets (Taken 9/3/2024 2346)  Outcome Evaluation: A&O to self.  Plan of Care Reviewed With:   patient   spouse  Goal: Patient-Specific Goal (Individualized)  Description: You can add care plan individualizations to a care plan. Examples of Individualization might be:  \"Parent requests to be called daily at 9am for status\", \"I have a hard time hearing out of my right ear\", or \"Do not touch me to wake me up as it startles  me\".  Outcome: Not Progressing  Goal: Absence of Hospital-Acquired Illness or Injury  Outcome: Not Progressing  Intervention: Identify and Manage Fall Risk  Recent Flowsheet Documentation  Taken 9/3/2024 2200 by Cherelle Stuart RN  Safety Promotion/Fall Prevention:   activity supervised   patient and family education   safety round/check completed  Intervention: Prevent Skin Injury  Recent Flowsheet Documentation  Taken 9/3/2024 2200 by Cherelle Stuart RN  Body Position: turned  Intervention: Prevent and Manage VTE (Venous Thromboembolism) Risk  Recent Flowsheet Documentation  Taken 9/3/2024 2200 by Cherelle Stuart RN  VTE Prevention/Management: SCDs off (sequential compression devices)  Intervention: Prevent Infection  Recent Flowsheet Documentation  Taken 9/3/2024 2200 by Cherelle Stuart RN  Infection Prevention:   cohorting utilized   rest/sleep promoted   single patient room provided   hand hygiene promoted  Goal: Optimal Comfort and Wellbeing  Outcome: Not " Progressing  Goal: Readiness for Transition of Care  Outcome: Not Progressing         Goal Outcome Evaluation:      Plan of Care Reviewed With: patient, spouse        Outcome Evaluation: A&O to self.

## 2024-09-04 NOTE — PROGRESS NOTES
"   09/04/24 8966   Appointment Info   Signing Clinician's Name / Credentials (PT) Geo Long DPT   Rehab Comments (PT) spinal precautions   Living Environment   People in Home spouse   Current Living Arrangements house   Home Accessibility stairs to enter home   Number of Stairs, Main Entrance 1   Living Environment Comments Pt lives in house with ,  helps with ADLs and IADLs. 1 FLORIN then all needs met on main floor. Stairs to basement   Self-Care   Usual Activity Tolerance moderate   Current Activity Tolerance poor   Equipment Currently Used at Home walker, rolling   Fall history within last six months yes   Number of times patient has fallen within last six months 6   Activity/Exercise/Self-Care Comment Pt has assistance from spouse for functional mob, using walker prior to admit (but historically not using walker). Pt has has ~1 fall per month per spouse report.   General Information   Onset of Illness/Injury or Date of Surgery 08/30/24   Referring Physician Georges Loera MD   Pertinent History of Current Problem (include personal factors and/or comorbidities that impact the POC) Pt is 73 yo female who, per chart, \"history of Lewy body dementia who lives with her  who is her primary caregiver.  She was brought to the ER because of increasing confusion.  Patient has been found lying down on the floor at different parts of the house incontinent of urine.  She has been recently treated with antibiotics for UTI.  The patient's dementia has been worsening over the last several years, and now likely requires a higher level of support. \"   Existing Precautions/Restrictions fall   Cognition   Affect/Mental Status (Cognition) confused   Orientation Status (Cognition) oriented to;person   Follows Commands (Cognition) follows one-step commands;25-49% accuracy   Pain Assessment   Patient Currently in Pain No   Integumentary/Edema   Integumentary/Edema no deficits were identifed   Posture    Posture " Forward head position   Range of Motion (ROM)   Range of Motion ROM is WFL   Strength (Manual Muscle Testing)   Strength (Manual Muscle Testing) Able to perform R SLR;Able to perform L SLR;Deficits observed during functional mobility   Bed Mobility   Comment, (Bed Mobility) supine > sit minAx2, difficulty with following cues, needing manual facilitation to increase understanding and performance.   Transfers   Comment, (Transfers) STS without AD, hand hold and min-modA, unsteady on feet and pt quickly asking to sit down.   Gait/Stairs (Locomotion)   Comment, (Gait/Stairs) unable to ambulate on this date.   Balance   Balance Comments balance deficits and unsteady with standing at EOB w/ and w/o AD. Posterior lean noted.   Clinical Impression   Criteria for Skilled Therapeutic Intervention Yes, treatment indicated   PT Diagnosis (PT) impaired functional mobility   Influenced by the following impairments confusion and cognition deficits, decreased strength and activity tolerance.   Functional limitations due to impairments difficulty with bed mobility, transfers, and ambulation   Clinical Presentation (PT Evaluation Complexity) stable   Clinical Presentation Rationale clinical judgment   Clinical Decision Making (Complexity) low complexity   Planned Therapy Interventions (PT) balance training;bed mobility training;gait training;home exercise program;neuromuscular re-education;ROM (range of motion);stair training;strengthening;stretching;transfer training;progressive activity/exercise   Risk & Benefits of therapy have been explained evaluation/treatment results reviewed;care plan/treatment goals reviewed;risks/benefits reviewed;current/potential barriers reviewed;participants voiced agreement with care plan;participants included;patient   PT Total Evaluation Time   PT Eval, Low Complexity Minutes (16137) 10   Physical Therapy Goals   PT Frequency 4x/week   PT Predicted Duration/Target Date for Goal Attainment 09/11/24    PT Goals Bed Mobility;Transfers;Gait;Stairs   PT: Bed Mobility Supervision/stand-by assist;Supine to/from sit;Within precautions   PT: Transfers Minimal assist;Sit to/from stand;Assistive device   PT: Gait Minimal assist;Standard walker;100 feet   PT: Stairs 1 stair;Minimal assist;Assistive device   Interventions   Interventions Quick Adds Therapeutic Activity   Therapeutic Activity   Therapeutic Activities: dynamic activities to improve functional performance Minutes (79457) 23   Symptoms Noted During/After Treatment Fatigue;Dizziness   Treatment Detail/Skilled Intervention Pt supine in bed upon PT arrival. Pt agreeable to session, confused throughout and needing to be redirected often. Pt rambles and responses inappropriate to context of conversation. Pt cued for log roll technique with bed mob, but not following. Pt cued for scooting anteriorly towards EOB, modA. Pt performed STS x5 without AD at first, then with FWW and Bahman, cues on UE placement. Pt needing hand-over-hand cues/guidance in order to place hands on  of walker. Pt reporting dizziness and also stomach discomfort with standing. When asking for clarification when sitting, pt unable to identify. BP stable in sitting, unable to get with standing. Pt cued for log roll technique sit > supine, needing modA and manual facilitation throughout. Discussed d/c recommendations and long-term plans with spouse present, they are considering a facility but was unable to specify more information when asked. Pt's spouse is interested to see if she can improve her mobility at this time.   PT Discharge Planning   PT Plan facilitate bed mob with spinal precautions, progress standing tolerance (check BPs and symptoms), gait with close w/c follow as able.   PT Discharge Recommendation (DC Rec) Transitional Care Facility;Long term care facility   PT Rationale for DC Rec Pt at baseline lives in house with , has assistance with ADLs and IADLs, using FWW for  ambulation, hx of falls (~1 per month). Currently, pt is below baseline, limited to standing with Ax1 FWW on this date, unable to ambulate, symptomatic with standing (unable to say exactly what symptoms, but responded yes to dizziness). Will likely benefit mostly from LTC placement, possible  facility. TCF also recommended given pt's mobility below baseline.   PT Brief overview of current status Ax1 with FWW and Standing.   Total Session Time   Timed Code Treatment Minutes 23   Total Session Time (sum of timed and untimed services) 33

## 2024-09-04 NOTE — PROGRESS NOTES
"  PALLIATIVE CARE PROGRESS NOTE  [facility]     Patient Name: Elsa Jackson  Date of Admission: 8/30/2024   Today the patient was seen for: Chart review      Recommendations & Counseling     GOALS OF CARE:   Life-prolonging with limits   , Maykel has a good understanding of current hospitalization, hopeful that there is a reversible cause for current delirium.  At Kyle request, we have discussed trajectory of decline in dementia and what to expect in advanced stages.     ADVANCE CARE PLANNING:  No health care directive on file. Per system policy, Surrogate Decision-makers for Patients With Diminished Decision-making Capacity offers guidance on possible decision-makers. Maykel notes there is a directive at home where he is named as HCA.  He will bring in copy.  POLST form dated 9/3/2024 can be found in [facility] docs.    Code status: No CPR- Do NOT Intubate     MEDICAL MANAGEMENT:   We are not actively managing symptoms at this time.     PSYCHOSOCIAL/SPIRITUAL SUPPORT:  Family Maykel and Elsa have 3 children, 2 from Kyle previous relationship that are estranged.  aMykel is now in communication with his 3rd son in the [employer] daily for support with Elsa's changing condition.  Friends Kyle has neighbors who have been a support to him, one in health care who has been trained in dementia.    Maykel and Elsa have been involved in Dementia Support Group at [facility], but unable to go recently.  Leonie community: [employer]   Maykel states they would want a \" for last rights\" some day, but declined  services.   Maykel was teary during palliative conversation and notes he has had times of deep depression related to Elsa's illness.  He has had thoughts and plans of suicide in the past, but states he is not thinking of a plan now.  [facility] and agrees he would call if suicidal thoughts returned.   Recommended re-connecting with his mental health " support now as there are likely to be ups and downs to come in the future and it would likely benefit him to have the support in place.         Palliative Care will continue to follow. Thank you for the consult and allowing us to aid in the care of Elsa Jackson.    These recommendations have been discussed with bedside nurse STEVEN Arzate.    LEONARDA Tubbs CNS  [facility], Palliative Care  Securely message with the [email or web address] Web Console (learn more here) or  Text page via [facility] Paging/Directory        Assessment          Elsa Jackson is a 74 year old female with a past medical history of Lewy body dementia, CKD, hypothyroidism, L1 compression fracture who presented on 8/30  with increasing falls, aggressive and disorganized behavior.   She is found to have new compression deformity of T11. Today she less somnolent and interactive per nursing report.      Interval History:     Multidisciplinary collaboration:  Appreciate input of bedside nurse STEVEN Arzate    Patient/family narrative  None - as patient improved and  now hopeful.        Physical Exam:   Temp:  [97.5  F (36.4  C)-98.3  F (36.8  C)] 98.3  F (36.8  C)  Pulse:  [67-78] 69  Resp:  [16] 16  BP: (134-167)/(74-95) 167/95  SpO2:  [96 %-97 %] 96 %  120 lbs 5.94 oz      Data Reviewed:     No results found for this or any previous visit (from the past 24 hour(s)).      Medical Decision Making       MANAGEMENT DISCUSSED with the following over the past 24 hours: Hospitalist Sid Brown MD and bedside nurse STEVEN Arzate.   10 MINUTES SPENT BY ME on the date of service doing chart review, history, exam, documentation & further activities per the note.

## 2024-09-04 NOTE — PROGRESS NOTES
09/04/24 1400   Appointment Info   Signing Clinician's Name / Credentials (OT) Yuliana Beny, OTRL   Living Environment   People in Home spouse   Current Living Arrangements house   Home Accessibility stairs to enter home   Number of Stairs, Main Entrance 1   Living Environment Comments All needs met on main level. Standard toilets and tub shower with grab bars   Self-Care   Usual Activity Tolerance moderate   Current Activity Tolerance poor   Regular Exercise No   Equipment Currently Used at Home walker, rolling   Fall history within last six months yes   Number of times patient has fallen within last six months 6   Activity/Exercise/Self-Care Comment Pt has assistance from spouse for functional mob, using walker prior to admit (but historically not using walker). Pt has has ~1 fall per month per spouse report. Pt was independent with toileting at baseline, assist with LE dressing and tub transfer from spouse at baseline   Instrumental Activities of Daily Living (IADL)   IADL Comments Spouse completes all IADLS including medication management at baseline   General Information   Onset of Illness/Injury or Date of Surgery 08/30/24   Referring Physician Sid Brown MD   Patient/Family Therapy Goal Statement (OT) Pt unable to state, pt spouse would like pt to return home   Additional Occupational Profile Info/Pertinent History of Current Problem 74-year-old female with history of Lewy body dementia who lives with her  who is her primary caregiver.  She was brought to the ER because of increasing confusion.  Patient has been found lying down on the floor at different parts of the house incontinent of urine.  She has been recently treated with antibiotics for UTI.  The patient's dementia has been worsening over the last several years, and now likely requires a higher level of support.      Was sedated on 9/3, likely from ativan  Now awake  Likely will need to pursue LTC/memory care   Existing  Precautions/Restrictions fall   Limitations/Impairments safety/cognitive   Cognitive Status Examination   Orientation Status person  (able to report name but required cues for birthday)   Affect/Mental Status (Cognitive) confused   Follows Commands follows one-step commands;25-49% accuracy;repetition of directions required   Safety Deficit severe deficit   Memory Deficit severe deficit   Attention Deficit severe deficit   Executive Function Deficit severe deficit   Cognitive Status Comments Pt presents with significant confusion, scored -28 on Short Blessed (pt unable to answer any questions correctly on assessment). Pt scored 12/30 on MoCA in July of this year. Demonstrates significant decline from cognitive baseline   Cognitive Screens/Assessments   Cognitive Assessments Completed Other Cognitive Screen/Assessment   Cognitive Assessment Test Short Blessed   Cognitive Assessment Test Score -28   Cognitive Assessment Test Interpretation Pt scored max number wrong on Short Blessed   Visual Perception   Visual Impairment/Limitations WFL   Sensory   Sensory Comments Pt reports intact but is a poor historian   Pain Assessment   Patient Currently in Pain No   Posture   Posture forward head position;protracted shoulders   Range of Motion Comprehensive   Comment, General Range of Motion Appears intact functionally, pt unable to follow commands needed to compelte form ROM assessment   Strength Comprehensive (MMT)   Comment, General Manual Muscle Testing (MMT) Assessment Unable to follow commands needed for MMT, pt appears globally weak   Coordination   Coordination Comments Impaired due to command following and sequencing deficits   Bed Mobility   Comment (Bed Mobility) Max assist   Transfers   Transfer Comments MAx assist   Balance   Balance Comments Impaired seated and standing   Activities of Daily Living   BADL Assessment/Intervention bathing;grooming;toileting   Bathing Assessment/Intervention   Comment, (Bathing)  Dependent per clinical reasoning   Grooming Assessment/Training   Comment, (Grooming) Max assist due to poor command following   Toileting   Comment, (Toileting) Dependent per clinical reasoning   Clinical Impression   Criteria for Skilled Therapeutic Interventions Met (OT) Yes, treatment indicated   OT Diagnosis Decline in ADL independence and safety   Influenced by the following impairments Confusion   OT Problem List-Impairments impacting ADL problems related to;activity tolerance impaired;cognition   Assessment of Occupational Performance 5 or more Performance Deficits   Identified Performance Deficits Impaired bathing toileting and grooming with significant cognitive decline   Planned Therapy Interventions (OT) cognition;ADL retraining;progressive activity/exercise   Clinical Decision Making Complexity (OT) problem focused assessment/low complexity   Risk & Benefits of therapy have been explained evaluation/treatment results reviewed;risks/benefits reviewed;care plan/treatment goals reviewed;current/potential barriers reviewed;participants voiced agreement with care plan;participants included;patient;spouse/significant other   OT Total Evaluation Time   OT Eval, Low Complexity Minutes (40935) 11   OT Goals   Therapy Frequency (OT) 5 times/week   OT Predicted Duration/Target Date for Goal Attainment 09/12/24   OT Goals Hygiene/Grooming;Toilet Transfer/Toileting;Cognition   OT: Hygiene/Grooming modified independent;while standing   OT: Toilet Transfer/Toileting Modified independent;toilet transfer;using adaptive equipment;cleaning and garment management   OT: Cognitive Patient/caregiver will verbalize understanding of cognitive assessment results/recommendations as needed for safe discharge planning   Therapeutic Activities   Therapeutic Activity Minutes (28998) 16   Symptoms noted during/after treatment fatigue   Treatment Detail/Skilled Intervention OT; Pt appeared confused, required nearly constant redirection  to task. Pt completed supine to sit with max assist and repeated cues for technique. Attempted to engage pt in g/h, pt unable to follow commands to complete with repeated cuing requiring max assist. Attempted sit to stand with max assist and step by step cues, pt unable to clear posterior and follow commands for full stand. Sit to supine with min assist, max assist x2 to scoot up in bed. Pt in bed with alarm on and call light upon TO deparutre   OT Discharge Planning   OT Plan Monitor cog, sequencing g/h, see if spouse is thinking TCU vs LTC   OT Discharge Recommendation (DC Rec) Transitional Care Facility;Long term care facility   OT Rationale for DC Rec Patient presents significantly below baseline with cognition and mobility. If pt begins to clear and is able to follow more commands, anticipate TCU would be appropriate. If pt is unable to clear, she will require a more supportive living environment. OT will continue to follow and update recommendations as needed   OT Brief overview of current status -28 on Short Blessed (was unable to answer any question correctly)

## 2024-09-04 NOTE — PLAN OF CARE
"  Problem: Adult Inpatient Plan of Care  Goal: Plan of Care Review  Description: The Plan of Care Review/Shift note should be completed every shift.  The Outcome Evaluation is a brief statement about your assessment that the patient is improving, declining, or no change.  This information will be displayed automatically on your shift  note.  Outcome: Progressing  Flowsheets (Taken 9/4/2024 1713)  Outcome Evaluation: A&O to self, ambulated to bathroom ax2, tolerating regular diet, denies pain  Plan of Care Reviewed With:   patient   spouse  Overall Patient Progress: no change  Goal: Patient-Specific Goal (Individualized)  Description: You can add care plan individualizations to a care plan. Examples of Individualization might be:  \"Parent requests to be called daily at 9am for status\", \"I have a hard time hearing out of my right ear\", or \"Do not touch me to wake me up as it startles  me\".  Outcome: Progressing  Goal: Absence of Hospital-Acquired Illness or Injury  Outcome: Progressing  Intervention: Identify and Manage Fall Risk  Recent Flowsheet Documentation  Taken 9/4/2024 1400 by Gwen Brown RN  Safety Promotion/Fall Prevention: activity supervised  Goal: Optimal Comfort and Wellbeing  Outcome: Progressing  Goal: Readiness for Transition of Care  Outcome: Progressing     Problem: Dementia Signs/Symptoms  Goal: Improved Behavioral Control (Dementia Signs/Symptoms)  Outcome: Progressing  Goal: Improved Mood Symptoms (Dementia Signs/Symptoms)  Outcome: Progressing  Goal: Optimized Cognitive Function (Dementia Signs/Symptoms)  Outcome: Progressing  Goal: Optimized Oral Intake (Dementia Signs/Symptoms)  Outcome: Progressing  Goal: Improved Sleep (Dementia Signs/Symptoms)  Outcome: Progressing  Goal: Enhanced Social or Functional Skills and Ability (Dementia Signs/Symptoms)  Outcome: Progressing     Problem: Orthopaedic Fracture  Goal: Absence of Bleeding  Outcome: Progressing  Goal: Bowel Elimination  Outcome: " Progressing  Goal: Absence of Embolism Signs and Symptoms  Outcome: Progressing  Goal: Fracture Stability  Outcome: Progressing  Goal: Optimal Functional Ability  Outcome: Progressing  Goal: Absence of Infection Signs and Symptoms  Outcome: Progressing  Goal: Effective Tissue Perfusion  Outcome: Progressing  Goal: Optimal Pain Control and Function  Outcome: Progressing  Goal: Effective Oxygenation and Ventilation  Outcome: Progressing     Problem: Acute Kidney Injury/Impairment  Goal: Fluid and Electrolyte Balance  Outcome: Progressing  Goal: Improved Oral Intake  Outcome: Progressing  Goal: Effective Renal Function  Outcome: Progressing     Problem: Fall Injury Risk  Goal: Absence of Fall and Fall-Related Injury  Outcome: Progressing  Intervention: Promote Injury-Free Environment  Recent Flowsheet Documentation  Taken 9/4/2024 1400 by Gwen Brown RN  Safety Promotion/Fall Prevention: activity supervised     Problem: Pain Acute  Goal: Optimal Pain Control and Function  Outcome: Progressing   Goal Outcome Evaluation:      Plan of Care Reviewed With: patient, spouse    Overall Patient Progress: no changeOverall Patient Progress: no change    Outcome Evaluation: A&O to self, ambulated to bathroom ax2, tolerating regular diet, denies pain patient was evaluated by

## 2024-09-04 NOTE — PROGRESS NOTES
"CLINICAL NUTRITION SERVICES - REASSESSMENT NOTE      Recommendations Ordered by Registered Dietitian (RD):   Ensure Enlive BID    Encourage frequent meals (BID-TID as tolerated)    Encourage adequate protein amounts during meals.   Malnutrition:  % Weight Loss:  Weight loss does not meet criteria for malnutrition.  % Intake:  No decreased intake noted  Subcutaneous Fat Loss:  Orbital region mild depletion and Upper arm region mild depletion  Muscle Loss:  Temporal region mild depletion, Clavicle bone region mild depletion, Scapular bone region mild depletion, Dorsal hand region mild depletion, and Anterior thigh region mild depletion  Fluid Retention:  No current documentation of edema    Malnutrition Diagnosis:   Malnutrition related to inadequate protein intake as evidenced by mild fat depletion in orbital and upper arm regions and mild muscle depletion in temporal, clavicle bone, scapular bone, dorsal hand, and anterior thigh regions.       EVALUATION   Diet:  Regular (RD added Ensure Enlive BID)      Intake/Tolerance:  Per EMR, pt's appetite ranges from poor-fair (one instance of Good) and intakes range from 25-50% (one instance of good).  Pt ordering meals consistently (per [employer])      ASSESSED NUTRITION NEEDS:  Dosing Weight 54.6 kg  Estimated Energy Needs: 5611-7657 kcals (25-30 Kcal/Kg)  Justification: repletion  Estimated Protein Needs: 43-55 grams protein (0.8-1 g pro/Kg)  Justification: Repletion, preservation of lean body mass, CKD  Estimated Fluid Needs: Justification: per provider pending fluid status      NEW FINDINGS:   RD spoke with pt and pt's  in room. Per POC note, pt is A&O to self at current time.  RD asked about intake and appetite. Pt and her  reported good appetite and semi-consistent intake. Pt reported that she typically eats about 2 times per day.  Pt's  stated that she will consistently have a \"big breakfast\" of eggs and hashbrowns and that she typically eats " %. He stated that she will have vegetables for lunch and maybe some type of carb. He also reported that he will sometimes give her protein shakes from [employer] (could not specify brand).  RD asked about intake of protein in food. Pt's  reported that, together, they will go through about 1 lb of protein per week (averaging out to ~2 ounces per day between them). He also reported that she will occasionally eat peanut butter.   Other protein sources nor reported.  RD performed NFPE. Findings noted above.  RD discussed adding nutrition supplements to pt's diet. Pt and pt's  verbalized agreed. RD to order Ensure Enlive BID.  RD discussed options for whole food protein sources. Pt's  verbalized understanding.  Pt and pt's  denied further questions.    RD will follow as appropriate.      LABS  Labs reviewed  Noted: high Cl(108), low Ca(8.7), high glc(123)     MEDICATIONS  Medications reviewed      NUTRITION DIAGNOSIS  Malnutrition related to inadequate protein intake as evidenced by mild fat depletion in orbital and upper arm regions and mild muscle depletion in temporal, clavicle bone, scapular bone, dorsal hand, and anterior thigh regions.    INTERVENTIONS  Recommendations / Nutrition Prescription  Ensure Enlive BID    Encourage frequent meals (BID-TID as tolerated)    Encourage adequate protein amounts during meals.    Implementation  Medical Food Supplement    Goals  Intake of >75% of all meals and supplements    Meals BID-TID and Supplements BID      MONITORING AND EVALUATION:  Progress towards goals will be monitored and evaluated per protocol and Practice Guidelines, Food intake, and Liquid meal replacement or supplement      Johanna Chilel RD, LD  Clinical Dietitian  3rd Floor/ICU: [phone]  All Other Floors: [phone]  Weekends/Holiday: [phone]  Office: [phone]

## 2024-09-04 NOTE — PROGRESS NOTES
Care Management Follow Up    Expected Discharge Date: 09/06/2024     Concerns to be Addressed: Diischarge planning     Patient plan of care discussed at interdisciplinary rounds: Yes    Anticipated Discharge Disposition: LTC/New MC      Anticipated Discharge Services: None  Anticipated Discharge DME: Walker, Wheelchair    Patient/family educated on Medicare website which has current facility and service quality ratings: yes  Education Provided on the Discharge Plan: Yes  Patient/Family in Agreement with the Plan: yes    Referrals Placed by CM/SW: Skilled Nursing Facility   Private pay costs discussed: private room/amenity fees    Discussed  Partnership in Safe Discharge Planning  document with patient/family: Yes     Handoff Completed: No, handoff not indicated or clinically appropriate    Additional Information:  SW met with patient and spouse at bedside to further discuss discharge planning. OT eval pending at time of discussion, but spouse anticipates patient will need LTC/MC at discharge.     Spouse requesting that referral be sent to [facility]TC in [place].   While SW was in room, pt's spouse was also speaking with Ren[facility] in [facility]/. They are coming to assess patient in hospital this afternoon.   [facility] in [place] is also reviewing    Spouse is aware of estimated anticipated private pay cost for LTC/MC setting and is agreeable.     Next Steps: Obtain MC/LTC placement. SW will continue to follow.     AZALIA Caro, [facility]   Inpatient Care Coordination  [facility]   [phone]

## 2024-09-05 NOTE — PLAN OF CARE
"Goal Outcome Evaluation:      Plan of Care Reviewed With: patient, spouse    Overall Patient Progress: no changeOverall Patient Progress: no change    Outcome Evaluation: Pt sleepy today but wakens to eat and spouse feeding her, turned and repositioned q2-4 hours, was going to discharge today to facility but spouse would like to take home and SW involved working on home care help so discharge delayed, Total care and incontinent of urine/stool, purewick used today and worked well, bruises on body in various stages of healing from falls at home.      Problem: Adult Inpatient Plan of Care  Goal: Plan of Care Review  Description: The Plan of Care Review/Shift note should be completed every shift.  The Outcome Evaluation is a brief statement about your assessment that the patient is improving, declining, or no change.  This information will be displayed automatically on your shift  note.  Outcome: Progressing  Flowsheets (Taken 9/5/2024 9097)  Outcome Evaluation: Pt sleepy today but wakens to eat and spouse feeding her, turned and repositioned q2-4 hours, was going to discharge today to facility but spouse would like to take home and SW involved working on home care help so discharge delayed, Total care and incontinent of urine/stool, purewick used today and worked well, bruises on body in various stages of healing from falls at home.  Plan of Care Reviewed With:   patient   spouse  Overall Patient Progress: no change  Goal: Patient-Specific Goal (Individualized)  Description: You can add care plan individualizations to a care plan. Examples of Individualization might be:  \"Parent requests to be called daily at 9am for status\", \"I have a hard time hearing out of my right ear\", or \"Do not touch me to wake me up as it startles  me\".  Outcome: Progressing  Goal: Absence of Hospital-Acquired Illness or Injury  Outcome: Progressing  Intervention: Identify and Manage Fall Risk  Recent Flowsheet Documentation  Taken 9/5/2024 " 0837 by Carito Barlow RN  Safety Promotion/Fall Prevention:   activity supervised   patient and family education   safety round/check completed  Intervention: Prevent Skin Injury  Recent Flowsheet Documentation  Taken 9/5/2024 1700 by Carito Barlow RN  Body Position:   heels elevated   log-rolled  Taken 9/5/2024 1351 by Carito Barlow RN  Body Position:   heels elevated   weight shifting  Taken 9/5/2024 1120 by Carito Barlow RN  Body Position:   turned   left  Intervention: Prevent and Manage VTE (Venous Thromboembolism) Risk  Recent Flowsheet Documentation  Taken 9/5/2024 0837 by Carito Barlwo RN  VTE Prevention/Management: SCDs off (sequential compression devices)  Intervention: Prevent Infection  Recent Flowsheet Documentation  Taken 9/5/2024 0837 by Carito Barlow RN  Infection Prevention:   cohorting utilized   rest/sleep promoted   single patient room provided   hand hygiene promoted  Goal: Optimal Comfort and Wellbeing  Outcome: Progressing  Goal: Readiness for Transition of Care  Outcome: Progressing     Problem: Dementia Signs/Symptoms  Goal: Improved Behavioral Control (Dementia Signs/Symptoms)  Outcome: Progressing  Goal: Improved Mood Symptoms (Dementia Signs/Symptoms)  Outcome: Progressing  Goal: Optimized Cognitive Function (Dementia Signs/Symptoms)  Outcome: Progressing  Goal: Optimized Oral Intake (Dementia Signs/Symptoms)  Outcome: Progressing  Goal: Improved Sleep (Dementia Signs/Symptoms)  Outcome: Progressing  Goal: Enhanced Social or Functional Skills and Ability (Dementia Signs/Symptoms)  Outcome: Progressing     Problem: Orthopaedic Fracture  Goal: Absence of Bleeding  Outcome: Progressing  Goal: Bowel Elimination  Outcome: Progressing  Goal: Absence of Embolism Signs and Symptoms  Outcome: Progressing  Intervention: Prevent or Manage Embolism Risk  Recent Flowsheet Documentation  Taken 9/5/2024 0837 by Carito Barlow RN  VTE Prevention/Management:  SCDs off (sequential compression devices)  Goal: Fracture Stability  Outcome: Progressing  Goal: Optimal Functional Ability  Outcome: Progressing  Intervention: Optimize Functional Ability  Recent Flowsheet Documentation  Taken 9/5/2024 1700 by Carito Barlow RN  Positioning/Transfer Devices:   pillows   in use  Taken 9/5/2024 1351 by Carito Barlow RN  Activity Management: bedrest  Positioning/Transfer Devices:   pillows   in use  Taken 9/5/2024 1120 by Carito Barlow RN  Activity Management:   bedrest   activity adjusted per tolerance  Positioning/Transfer Devices:   pillows   in use  Taken 9/5/2024 0837 by Carito Barlow RN  Activity Management:   bedrest   activity adjusted per tolerance  Positioning/Transfer Devices:   pillows   in use  Goal: Absence of Infection Signs and Symptoms  Outcome: Progressing  Goal: Effective Tissue Perfusion  Outcome: Progressing  Goal: Optimal Pain Control and Function  Outcome: Progressing  Goal: Effective Oxygenation and Ventilation  Outcome: Progressing  Intervention: Promote Airway Secretion Clearance  Recent Flowsheet Documentation  Taken 9/5/2024 1351 by Carito Barlow RN  Activity Management: bedrest  Taken 9/5/2024 1120 by Carito Barlow RN  Activity Management:   bedrest   activity adjusted per tolerance  Taken 9/5/2024 0837 by Carito Barlow RN  Activity Management:   bedrest   activity adjusted per tolerance  Intervention: Optimize Oxygenation and Ventilation  Recent Flowsheet Documentation  Taken 9/5/2024 1700 by Carito Barlow RN  Head of Bed (HOB) Positioning: HOB at 30-45 degrees  Taken 9/5/2024 1351 by Carito Barlow RN  Head of Bed (HOB) Positioning: HOB at 20 degrees  Taken 9/5/2024 1120 by Carito Barlow RN  Head of Bed (HOB) Positioning: HOB at 30 degrees  Taken 9/5/2024 0837 by Carito Barlow RN  Head of Bed (HOB) Positioning: HOB at 30 degrees     Problem: Acute Kidney Injury/Impairment  Goal:  Fluid and Electrolyte Balance  Outcome: Progressing  Goal: Improved Oral Intake  Outcome: Progressing  Goal: Effective Renal Function  Outcome: Progressing  Intervention: Monitor and Support Renal Function  Recent Flowsheet Documentation  Taken 9/5/2024 0837 by Carito Barlow RN  Medication Review/Management: medications reviewed     Problem: Fall Injury Risk  Goal: Absence of Fall and Fall-Related Injury  Outcome: Progressing  Intervention: Identify and Manage Contributors  Recent Flowsheet Documentation  Taken 9/5/2024 0837 by Carito Barlow RN  Medication Review/Management: medications reviewed  Intervention: Promote Injury-Free Environment  Recent Flowsheet Documentation  Taken 9/5/2024 0837 by Carito Barlow RN  Safety Promotion/Fall Prevention:   activity supervised   patient and family education   safety round/check completed     Problem: Pain Acute  Goal: Optimal Pain Control and Function  Outcome: Progressing  Intervention: Prevent or Manage Pain  Recent Flowsheet Documentation  Taken 9/5/2024 0837 by Carito Barlow RN  Medication Review/Management: medications reviewed

## 2024-09-05 NOTE — PLAN OF CARE
"Physical Therapy Discharge Summary    Reason for therapy discharge:    Discharged to transitional care facility.    Progress towards therapy goal(s). See goals on Care Plan in [facility] electronic health record for goal details.  Goals not met.  Barriers to achieving goals:   discharge from facility.    Therapy recommendation(s):    Continued therapy is recommended.  Rationale/Recommendations:  Per prior PT recommendation, \"Pt at baseline lives in house with , has assistance with ADLs and IADLs, using FWW for ambulation, hx of falls (~1 per month). Currently, pt is below baseline, limited to standing with Ax1 FWW on this date, unable to ambulate, symptomatic with standing (unable to say exactly what symptoms, but responded yes to dizziness). Will likely benefit mostly from LTC placement, possible  facility. TCF also recommended given pt's mobility below baseline.\".      "

## 2024-09-05 NOTE — PROGRESS NOTES
[facility]    Medicine Progress Note - Hospitalist Service    Date of Admission:  8/30/2024    Assessment & Plan     74-year-old female with history of Lewy body dementia who lives with her  who is her primary caregiver.  She was brought to the ER because of increasing confusion.  Patient has been found lying down on the floor at different parts of the house incontinent of urine.  She has been recently treated with antibiotics for UTI.  The patient's dementia has been worsening over the last several years, and now likely requires a higher level of support.      Was sedated on 9/3, likely from ativan  Now awake  Likely will need to pursue LTC/memory care  Has a bed for today, but now  wants to take her home    Sedation    - received oral dilaudid 1mg PO on 8/31    - received Ativan 0.5mg x 1 at noon on 9/1    - otherwise it does not appear she received any other sedating meds    - stopped all potentially sedating meds    - resolved, now at her baseline    Fever    - had mild fever 100.8    - so far infectious work-up negative (see above)    - obtained blood culture, so far negative    - resolved    Lewy body dementia  Acute confusional state  Dementia with behavioral abnormality  Probable hallucinations    - patient is unable to give any history and has dementia/confusion at baseline    - infectious work-up on admission negative: UA neg, respiratory panel neg, stool enteric panel negative, CT scan chest negative for infection, no respiratory symptoms    - takes olanzapine 2.5 mg by mouth 2 times daily (hold for sedation)    - melatonin at night for sleeping    - patient seen by [facility]    - cont aricept     JANETTE    - improved    - IVF- ok to stop as patient now awake and eating well    Acute/subacute moderate T11 compression fracture  Chronic severe L1 compression   Chronic L3 superior endplate compression fracture    - Neurosurgery consulted, MRI with re-demonstration of above  abnormalities    - ok to be out of bed, no excessive bending or lifting greater than 5-10 lbs, follow up will be arranged by neurosurgery     Hypothyroidism    - continue levothyroxine 112 mcg daily      in room.  Spoke with son, who has to call me back. States dad needs help at home to take care of her there. He will need to call me back     Diet: Regular Diet Adult  Snacks/Supplements Adult: Ensure Enlive; Between Meals  Diet    DVT Prophylaxis: Pneumatic Compression Devices  Borjas Catheter: Not present  Lines: None     Cardiac Monitoring: None  Code Status: No CPR- Do NOT Intubate      Clinically Significant Risk Factors                             # Moderate Malnutrition: based on nutrition assessment    # Financial/Environmental Concerns: none      Disposition Plan      Expected Discharge Date: 09/05/2024,  9:00 AM    Destination: assisted living            Sid Brown MD  Hospitalist Service  [facility]  Securely message with [employer] (more info)  Text page via [employer] Paging/Directory   ______________________________________________________________________    Interval History   Patient awake today. At her baseline confusion per .  Denies chest pain, sob, abdo pain, n/v    Physical Exam   Vital Signs: Temp: 97.1  F (36.2  C) Temp src: Axillary BP: 138/74 Pulse: 51   Resp: 16 SpO2: 97 % O2 Device: None (Room air)    Weight: 120 lbs 5.94 oz    General: awake, alert, confused  Respiratory: Normal work of breathing, clear    Medical Decision Making       35 MINUTES SPENT BY ME on the date of service doing chart review, history, exam, documentation & further activities per the note.        Data     I have personally reviewed the following data over the past 24 hrs:    4.2  \   9.1 (L)   / 193; 193     N/A N/A N/A /  N/A   N/A N/A 0.77 \       Imaging results reviewed over the past 24 hrs:   No results found for this or any previous visit (from the past 24 hour(s)).

## 2024-09-05 NOTE — PROGRESS NOTES
Care Management Follow Up    Length of Stay (days): 5    Expected Discharge Date: 09/05/2024     Concerns to be Addressed: discharge planning     Patient plan of care discussed at interdisciplinary rounds: Yes    Anticipated Discharge Disposition: TCU vs HC  Anticipated Discharge Services: Home Care  Anticipated Discharge DME: Walker, Wheelchair    Patient/family educated on Medicare website which has current facility and service quality ratings: yes  Education Provided on the Discharge Plan: Yes  Patient/Family in Agreement with the Plan: yes    Referrals Placed by CM/SW:  Home Care      Additional Information:  CM/SW following for care coordination/discharge planning. Patient was planned to discharge to [facility] TCU and then LTC today.     CM spoke with  at bedside who now states he does not want the patient to go to any nursing facility at this time and wants to take her home. He stated he feels like he can care for her at home. He would like resources for additional help at home and equipment for home. CM updated [facility] and canceled [facility] transport.     Home care referral was sent for HH RN/PT/OT/HHA/SW, resource list for private pay care giving was left at patient's bedside. PT/OT planning to attempt to work with patient today while  is present, per his request.      was agreeable to VASQUEZ speaking with his son Thee regarding discharge plans also. CM attempted to call, no answer LVM without medical info.      feels he would be able to transport patient for discharge at this time.     Addendum 1534: VASQUEZ spoke with patient's son Thee via phone who was aware of his father's desire to bring patient home vs TCU/LTC at this time. He states they would like resources for a home discharge and was in agreement with sending home care referrals, and stated he would assist with longer term planning once the patient leaves the hospital.     CM also left medical supply DME resources at patient's  bedside, attempted to call spouse with update on plan and home care referrals, no answer.     Majo Dunaway RN, BSN  Inpatient Care Coordination  [facility]  [phone]

## 2024-09-05 NOTE — PLAN OF CARE
"Care from [id number]    Inpatient Progress Note:  For complete assessment see flow sheet documentation.    BP (!) 152/98 (BP Location: Right arm)   Pulse 81   Temp 98  F (36.7  C) (Oral)   Resp 18   Ht 1.676 m (5' 6\")   Wt 54.6 kg (120 lb 5.9 oz)   SpO2 96%   BMI 19.43 kg/m         Orientation: Oriented to self  Neuro: Lewy Body dementia  Pain status: non verbal signs of pain absent.  Activity: Total care dependent. Up with assist x2  Peripheral edema: WNL  Resp: On room air.   Cardiac: WNL  GI/ :  Incontinent x2. Purewick in place  Skin: intact  LDA: R PIV  Infusions: SL  Pertinent Labs:   Diet: Reg diet. Needs feeding assistance  Consults: SW  Discharge Plan: LTC/MC placement.     Goal Outcome Evaluation:      Plan of Care Reviewed With: patient    Overall Patient Progress: no changeOverall Patient Progress: no change    Outcome Evaluation: Pt is oreinted to self only. Total care dependent. Denies pain. SW following for LTC placement. POC ongoing      Problem: Adult Inpatient Plan of Care  Goal: Plan of Care Review  Description: The Plan of Care Review/Shift note should be completed every shift.  The Outcome Evaluation is a brief statement about your assessment that the patient is improving, declining, or no change.  This information will be displayed automatically on your shift  note.  Outcome: Progressing  Flowsheets (Taken 9/4/2024 2315)  Outcome Evaluation: Pt is oreinted to self only. Total care dependent. Denies pain. SW following for LTC placement. POC ongoing  Plan of Care Reviewed With: patient  Overall Patient Progress: no change  Goal: Patient-Specific Goal (Individualized)  Description: You can add care plan individualizations to a care plan. Examples of Individualization might be:  \"Parent requests to be called daily at 9am for status\", \"I have a hard time hearing out of my right ear\", or \"Do not touch me to wake me up as it startles  me\".  Outcome: Progressing  Goal: Absence of " Hospital-Acquired Illness or Injury  Outcome: Progressing  Goal: Optimal Comfort and Wellbeing  Outcome: Progressing  Goal: Readiness for Transition of Care  Outcome: Progressing     Problem: Dementia Signs/Symptoms  Goal: Improved Behavioral Control (Dementia Signs/Symptoms)  Outcome: Progressing  Goal: Improved Mood Symptoms (Dementia Signs/Symptoms)  Outcome: Progressing  Goal: Optimized Cognitive Function (Dementia Signs/Symptoms)  Outcome: Progressing  Goal: Optimized Oral Intake (Dementia Signs/Symptoms)  Outcome: Progressing  Goal: Improved Sleep (Dementia Signs/Symptoms)  Outcome: Progressing  Goal: Enhanced Social or Functional Skills and Ability (Dementia Signs/Symptoms)  Outcome: Progressing     Problem: Orthopaedic Fracture  Goal: Absence of Bleeding  Outcome: Progressing  Goal: Bowel Elimination  Outcome: Progressing  Goal: Absence of Embolism Signs and Symptoms  Outcome: Progressing  Goal: Fracture Stability  Outcome: Progressing  Goal: Optimal Functional Ability  Outcome: Progressing  Goal: Absence of Infection Signs and Symptoms  Outcome: Progressing  Goal: Effective Tissue Perfusion  Outcome: Progressing  Goal: Optimal Pain Control and Function  Outcome: Progressing  Goal: Effective Oxygenation and Ventilation  Outcome: Progressing     Problem: Acute Kidney Injury/Impairment  Goal: Fluid and Electrolyte Balance  Outcome: Progressing  Goal: Improved Oral Intake  Outcome: Progressing  Goal: Effective Renal Function  Outcome: Progressing     Problem: Fall Injury Risk  Goal: Absence of Fall and Fall-Related Injury  Outcome: Progressing     Problem: Pain Acute  Goal: Optimal Pain Control and Function  Outcome: Progressing

## 2024-09-06 NOTE — PLAN OF CARE
"Goal Outcome Evaluation:      Plan of Care Reviewed With: patient    Overall Patient Progress: no changeOverall Patient Progress: no change    Outcome Evaluation: Pt is a total care dependent. Oriented to self only. Incontinent x2. R PIV is SL. Purewick in place, but not as effective as pt moves alot in bed. SW is following for placement      Problem: Adult Inpatient Plan of Care  Goal: Plan of Care Review  Description: The Plan of Care Review/Shift note should be completed every shift.  The Outcome Evaluation is a brief statement about your assessment that the patient is improving, declining, or no change.  This information will be displayed automatically on your shift  note.  Outcome: Progressing  Flowsheets (Taken 9/6/2024 0342)  Outcome Evaluation: Pt is a total care dependent. Oriented to self only. Incontinent x2. R PIV is SL. Purewick in place, but not as effective as pt moves alot in bed. SW is following for placement  Plan of Care Reviewed With: patient  Overall Patient Progress: no change  Goal: Patient-Specific Goal (Individualized)  Description: You can add care plan individualizations to a care plan. Examples of Individualization might be:  \"Parent requests to be called daily at 9am for status\", \"I have a hard time hearing out of my right ear\", or \"Do not touch me to wake me up as it startles  me\".  Outcome: Progressing  Goal: Absence of Hospital-Acquired Illness or Injury  Outcome: Progressing  Intervention: Identify and Manage Fall Risk  Recent Flowsheet Documentation  Taken 9/5/2024 2338 by Radha Harris, RN  Safety Promotion/Fall Prevention:   activity supervised   patient and family education   safety round/check completed  Intervention: Prevent and Manage VTE (Venous Thromboembolism) Risk  Recent Flowsheet Documentation  Taken 9/5/2024 2338 by Radha Harris, RN  VTE Prevention/Management: SCDs off (sequential compression devices)  Intervention: Prevent Infection  Recent Flowsheet " Documentation  Taken 9/5/2024 2338 by Radha Harris RN  Infection Prevention:   cohorting utilized   rest/sleep promoted   single patient room provided   hand hygiene promoted  Goal: Optimal Comfort and Wellbeing  Outcome: Progressing  Goal: Readiness for Transition of Care  Outcome: Progressing     Problem: Dementia Signs/Symptoms  Goal: Improved Behavioral Control (Dementia Signs/Symptoms)  Outcome: Progressing  Goal: Improved Mood Symptoms (Dementia Signs/Symptoms)  Outcome: Progressing  Goal: Optimized Cognitive Function (Dementia Signs/Symptoms)  Outcome: Progressing  Goal: Optimized Oral Intake (Dementia Signs/Symptoms)  Outcome: Progressing  Goal: Improved Sleep (Dementia Signs/Symptoms)  Outcome: Progressing  Goal: Enhanced Social or Functional Skills and Ability (Dementia Signs/Symptoms)  Outcome: Progressing     Problem: Orthopaedic Fracture  Goal: Absence of Bleeding  Outcome: Progressing  Goal: Bowel Elimination  Outcome: Progressing  Goal: Absence of Embolism Signs and Symptoms  Outcome: Progressing  Intervention: Prevent or Manage Embolism Risk  Recent Flowsheet Documentation  Taken 9/5/2024 2338 by Radha Harris RN  VTE Prevention/Management: SCDs off (sequential compression devices)  Goal: Fracture Stability  Outcome: Progressing  Goal: Optimal Functional Ability  Outcome: Progressing  Intervention: Optimize Functional Ability  Recent Flowsheet Documentation  Taken 9/5/2024 2338 by Radha Harris RN  Activity Management:   bedrest   activity adjusted per tolerance  Positioning/Transfer Devices:   pillows   in use  Goal: Absence of Infection Signs and Symptoms  Outcome: Progressing  Goal: Effective Tissue Perfusion  Outcome: Progressing  Goal: Optimal Pain Control and Function  Outcome: Progressing  Goal: Effective Oxygenation and Ventilation  Outcome: Progressing  Intervention: Promote Airway Secretion Clearance  Recent Flowsheet Documentation  Taken 9/5/2024 2338 by Radha Harris  RN  Cough And Deep Breathing: done independently per patient  Activity Management:   bedrest   activity adjusted per tolerance  Intervention: Optimize Oxygenation and Ventilation  Recent Flowsheet Documentation  Taken 9/5/2024 2338 by Radha Harris RN  Head of Bed (HOB) Positioning: HOB at 30 degrees     Problem: Acute Kidney Injury/Impairment  Goal: Fluid and Electrolyte Balance  Outcome: Progressing  Goal: Improved Oral Intake  Outcome: Progressing  Goal: Effective Renal Function  Outcome: Progressing  Intervention: Monitor and Support Renal Function  Recent Flowsheet Documentation  Taken 9/5/2024 2338 by Radha Harris RN  Medication Review/Management: medications reviewed     Problem: Fall Injury Risk  Goal: Absence of Fall and Fall-Related Injury  Outcome: Progressing  Intervention: Identify and Manage Contributors  Recent Flowsheet Documentation  Taken 9/5/2024 2338 by Radha Harris RN  Medication Review/Management: medications reviewed  Intervention: Promote Injury-Free Environment  Recent Flowsheet Documentation  Taken 9/5/2024 2338 by Radha Harris RN  Safety Promotion/Fall Prevention:   activity supervised   patient and family education   safety round/check completed     Problem: Pain Acute  Goal: Optimal Pain Control and Function  Outcome: Progressing  Intervention: Prevent or Manage Pain  Recent Flowsheet Documentation  Taken 9/5/2024 2338 by Radha Harris RN  Medication Review/Management: medications reviewed

## 2024-09-06 NOTE — PLAN OF CARE
Occupational Therapy Discharge Summary    Reason for therapy discharge:    Discharged to transitional care facility.    Progress towards therapy goal(s). See goals on Care Plan in [facility] electronic health record for goal details.  Goals partially met.  Barriers to achieving goals:   discharge from facility.    Therapy recommendation(s):    Continued therapy is recommended.  Rationale/Recommendations:  Patient would benefit from continued OT in TCU to return to prior level of function. See OT note for details.

## 2024-09-06 NOTE — PLAN OF CARE
Patient's After Visit Summary was reviewed with patient and/or spouse.   Patient verbalized understanding of After Visit Summary, recommended follow up and was given an opportunity to ask questions.   Discharge medications sent home with patient/family: No   Discharged with transport tech

## 2024-09-06 NOTE — DISCHARGE SUMMARY
[facility]  Hospitalist Discharge Summary      Date of Admission:  8/30/2024  Date of Discharge:  9/6/2024  Discharging Provider: Sid Brown MD  Discharge Service: Hospitalist Service    Discharge Diagnoses   Need for higher level of care  Sedation due to medications  JANETTE  Acute/subacute moderate T11 compression fracture   Agitation in the setting of underlying dementia    Clinically Significant Risk Factors     # Moderate Malnutrition: based on nutrition assessment      Follow-ups Needed After Discharge   Follow-up Appointments     Follow Up and recommended labs and tests      Follow up with detention physician.  The following labs/tests are   recommended: basic labs in 1-2 weeks.          Unresulted Labs Ordered in the Past 30 Days of this Admission       Date and Time Order Name Status Description    9/3/2024  9:35 AM Blood Culture Arm, Right Preliminary         These results will be followed up by NA    Discharge Disposition   Discharged to TCU  Condition at discharge: Stable    Hospital Course   Elsa Jackson is a 74 year old lady with Lewy body dementia, CKD, hypothyroidism, L1 compression fracture who lives with her  Maykel Jackson who is her primary caregiver.  Recently she had a urinary tract infection for which antibiotics were prescribed.  On getting history from the patient's  he says that she has not falling down more often lately.  That he is unable to take care of her as she continues to be aggressive at times quite this organized not sleeping during the night and keeping awake and is found at different places at home sleeping on the ground because of which she took her to the office to check if you have tract infection has returned.  He states that she does not have any cough, fever, foul-smelling urine.  I am unable to obtain any meaningful reliable history from the patient who is actively hallucinating talking to someone not  Sedation    - received oral  dilaudid 1mg PO on 8/31    - received Ativan 0.5mg x 1 at noon on 9/1    - otherwise it does not appear she received any other sedating meds    - stopped all potentially sedating meds    - resolved, now at her baseline     Fever    - had mild fever 100.8    - so far infectious work-up negative (see above)    - obtained blood culture, so far negative    - resolved     Lewy body dementia  Acute confusional state  Dementia with behavioral abnormality  Probable hallucinations    - patient is unable to give any history and has dementia/confusion at baseline    - infectious work-up on admission negative: UA neg, respiratory panel neg, stool enteric panel negative, CT scan chest negative for infection, no respiratory symptoms    - takes olanzapine 2.5 mg by mouth 2 times daily (hold for sedation)    - melatonin at night for sleeping    - patient seen by [facility]    - cont aricept     JANETTE    - improved    - IVF- ok to stop as patient now awake and eating well     Acute/subacute moderate T11 compression fracture  Chronic severe L1 compression   Chronic L3 superior endplate compression fracture    - Neurosurgery consulted, MRI with re-demonstration of above abnormalities    - ok to be out of bed, no excessive bending or lifting greater than 5-10 lbs, follow up will be arranged by neurosurgery     Hypothyroidism    - continue levothyroxine 112 mcg daily     Patient had an uneventful night.  This morning she is confused at her baseline.  She denies any pain.  She is eating and drinking.  Her  has arrived.  The patient had a TCU bed yesterday, but at that time her  changed his mind and wanted to take her home.  She stayed overnight as we tried to arrange some home services, but now the patient's  would like her to go to TCU.  This is likely the better choice anyway.  Patient still has a bed at the TCU facility.  She will discharge today.      Consultations This Hospital Stay   PHYSICAL THERAPY ADULT IP  CONSULT  PALLIATIVE CARE ADULT IP CONSULT  SOCIAL WORK IP CONSULT  NEUROSURGERY IP CONSULT  OCCUPATIONAL THERAPY ADULT IP CONSULT  PHYSICAL THERAPY ADULT IP CONSULT  OCCUPATIONAL THERAPY ADULT IP CONSULT  PHYSICAL THERAPY ADULT IP CONSULT    Code Status   No CPR- Do NOT Intubate    Time Spent on this Encounter   I, Sid Brown MD, personally saw the patient today and spent greater than 30 minutes discharging this patient.       Sid Brown MD  [facility] OBSERVATION DEPT  [address]  [facility] [address]  Phone: [phone]  ______________________________________________________________________    Physical Exam   Vital Signs: Temp: 97.5  F (36.4  C) Temp src: Axillary BP: 138/81 Pulse: 65   Resp: 16 SpO2: 96 % O2 Device: None (Room air)    Weight: 120 lbs 5.94 oz  Constitutional: awake, confused at baseline  Eyes: Lids and lashes normal, pupils equal, round and reactive to light, extra ocular muscles intact, sclera clear, conjunctiva normal  ENT: Normocephalic, without obvious abnormality, atraumatic, sinuses nontender on palpation, external ears without lesions, oral pharynx with moist mucous membranes, tonsils without erythema or exudates, gums normal and good dentition.  Respiratory: No increased work of breathing, good air exchange, clear to auscultation bilaterally, no crackles or wheezing  Cardiovascular: Normal apical impulse, regular rate and rhythm, normal S1 and S2, no S3 or S4, and no murmur noted       Primary Care Physician   Noe Kowalski    Discharge Orders      General info for SNF    Length of Stay Estimate: Long Term Care  Condition at Discharge: Stable  Level of care:skilled   Rehabilitation Potential: Fair  Admission H&P remains valid and up-to-date: Yes  Recent Chemotherapy: N/A  Use Nursing Home Standing Orders: Yes     Mantoux instructions    Give two-step Mantoux (PPD) Per Facility Policy Yes     Follow Up and recommended labs and tests    Follow up with  Nursing home physician.  The following labs/tests are recommended: basic labs in 1-2 weeks.     Reason for your hospital stay    Acute/subacute moderate T11 compression fracture  Worsening dementia  Sedation due to medications  Acute kidney injury, resolved     Activity - Up with nursing assistance     No CPR- Do NOT Intubate     Physical Therapy Adult Consult    Evaluate and treat as clinically indicated.    Reason:  weakness, dementia     Occupational Therapy Adult Consult    Evaluate and treat as clinically indicated.    Reason:  weakness, dementia     Fall precautions     Diet    Follow this diet upon discharge: Current Diet:Orders Placed This Encounter      Snacks/Supplements Adult: Ensure Enlive; Between Meals      Regular Diet Adult       Significant Results and Procedures   Most Recent 3 CBC's:  Recent Labs   Lab Test 09/05/24  0522 09/03/24  1023 09/02/24  1731   WBC 4.2 8.8 8.3   HGB 9.1* 9.3* 9.9*   MCV 94 94 94     193 182 191     Most Recent 3 BMP's:  Recent Labs   Lab Test 09/05/24  0522 09/02/24  1731 09/02/24  0557 08/31/24  0820 08/30/24  1521 08/30/24  1456   NA  --  140  --  142  --  137   POTASSIUM  --  3.8  --  4.3  --  4.2   CHLORIDE  --  108*  --  108*  --  101   CO2  --  22  --  23  --  22   BUN  --  18.0  --  23.3*  --  31.0*   CR 0.77 0.93 0.85 0.86   < > 1.26*   ANIONGAP  --  10  --  11  --  14   MYNOR  --  8.7*  --  8.9  --  10.0   GLC  --  123*  --  79  --  108*    < > = values in this interval not displayed.     Most Recent 2 LFT's:  Recent Labs   Lab Test 08/30/24  1521 04/21/24  2107   AST 31 27   ALT 19 16   ALKPHOS 85 90   BILITOTAL 1.1 1.0   ,   Results for orders placed or performed during the hospital encounter of 08/30/24   CT Head w/o Contrast    Narrative    CT SCAN OF THE HEAD WITHOUT CONTRAST   8/30/2024 4:19 PM     HISTORY: confusion    TECHNIQUE:  Axial images of the head and coronal reformations without  IV contrast material. Radiation dose for this scan was reduced  using  automated exposure control, adjustment of the mA and/or kV according  to patient size, or iterative reconstruction technique.    COMPARISON: 8/17/2024    FINDINGS: No midline shift or mass effect. No acute intracranial  hemorrhage. No hydrocephalus or extra-axial hemorrhage. Moderate  chronic small vessel ischemic changes. Moderate global cortical volume  loss with expected dilatation of the ventricular system. Intracranial  atheromatous disease.      Impression    IMPRESSION:   No acute findings. Chronic changes as above.      MELISSA RIGGINS MD         SYSTEM ID:  [id number]   CT Abdomen Pelvis w Contrast    Narrative    CT ABDOMEN PELVIS WITH CONTRAST 8/30/2024 4:20 PM    CLINICAL HISTORY: Abdominal pain. Confusion.     TECHNIQUE: CT scan of the abdomen and pelvis was performed following  injection of IV contrast. Multiplanar reformats were obtained. Dose  reduction techniques were used.  CONTRAST: 65 mL [id number]    COMPARISON: None.    FINDINGS: Limited by motion artifact throughout much of the study.    LOWER CHEST: No infiltrates or effusions.    HEPATOBILIARY: No significant mass or bile duct dilatation. No  calcified gallstones.     PANCREAS: No significant mass, duct dilatation, or inflammatory  change.    SPLEEN: Normal size.    ADRENAL GLANDS: No significant nodules.    KIDNEYS/BLADDER: No significant mass, stones, or hydronephrosis.    BOWEL: Diverticulosis in the colon. No acute inflammatory change. No  obstruction. Unremarkable appendix.    PELVIC ORGANS: No pelvic masses. Uterine prolapse.    ADDITIONAL FINDINGS: There are moderate atherosclerotic changes of the  visualized aorta and its branches. There is no evidence of aortic  dissection or aneurysm. No adenopathy or free fluid.    MUSCULOSKELETAL: Progressive compression deformity of L1. Stable  compression deformity of L3. New compression deformity of T11 that  appears acute.      Impression    IMPRESSION:   1.  Acute appearing compression  deformity of T11 with mild loss of  height. No definite retropulsed fragments.  2.  Progressive compression deformity of L1 with similar posterior  displacement of fracture fragments.  3.  Stable compression deformity of L3.  4.  No acute process within the abdomen and pelvis.    MADELYN MONTALVO MD         SYSTEM ID:  [id number]   MR Lumbar Spine w/o Contrast    Narrative    EXAM: MR LUMBAR SPINE WITHOUT CONTRAST  LOCATION: [facility]  DATE: 09/01/2024    INDICATION: Please include T11 for evaluation of fracture; Low back pain; Trauma and or suspected fracture; Insignificant trauma, rule out compression fracture; At risk for osteoporosis; No lumbar x-ray result available.  COMPARISON: Lumbar spine CT 01/10/2023. CT abdomen and pelvis 08/30/2024.  TECHNIQUE: Routine Lumbar Spine MRI without IV contrast.    FINDINGS:   Five lumbar-type vertebrae counting down from the presumed twelfth ribs. The tip of the conus medullaris is at L2. The cauda equina nerve roots and visualized lower thoracic spinal cord are within normal limits.    Recent (acute or subacute) T11 compression fracture with approximately 40-50% loss of vertebral body height and 2 mm retropulsion of fracture fragments contributing to mild spinal canal stenosis, unchanged since 08/30/2024. There is surrounding   perivertebral soft tissue edema T9-L1. The fracture demonstrates a fluid-filled intravertebral cleft, suggestive of a benign fracture. No associated focal destructive bone lesion or soft tissue mass.    Chronic severe L1 compression fracture is unchanged in alignment since 08/30/2024, though demonstrates progressive vertebral body collapse since 01/10/2023. There is 11 mm retropulsion of fracture fragments at L1, which contributes to mild to moderate   spinal canal stenosis.    Stable alignment of chronic L3 superior endplate compression fracture with superimposed Schmorl's node deformity resulting in approximately 25-35% loss of  central vertebral body height, unchanged dating back to at least 01/10/2023.    Exaggeration of the normal lumbar lordotic curvature. No destructive bone lesion or infection. Moderate asymmetric left lower lumbar predominant dorsal paraspinous muscular atrophy. Partially visualized degenerative changes of the sacroiliac joints.   Colonic diverticulosis.    Findings on a level by level basis are as follows:    T10-T11: Normal disc height and signal. No disc herniation. Retropulsed T11 superior endplate fracture fragments. Facet arthrosis. Mild bilateral neural foraminal stenosis. Mild spinal canal stenosis.    T11-T12: Normal disc height signal. No disc herniation. Facet arthrosis. No significant spinal canal or neural foraminal stenosis.    T12-L1: Moderate disc height loss. Loss of disc signal. Disc bulge and retropulsed L1 superior endplate fracture fragments. Facet arthrosis. Mild bilateral neural foraminal stenosis. Mild to moderate spinal canal stenosis.    L1-L2: Mild disc height loss. Loss of disc signal. Disc bulge. No disc herniation. Facet arthrosis and ligamentum flavum thickening. Mild to moderate left and mild right neural foraminal stenosis. No significant spinal canal stenosis.    L2-L3: Mild to moderate disc height loss. Loss of disc signal. Disc bulge. No disc herniation. Facet arthrosis and ligamentum flavum thickening. Mild bilateral neural foraminal stenosis. Mild to moderate spinal canal stenosis.    L3-L4: Mild disc height loss. Loss of disc signal. Disc bulge. No disc herniation. Facet arthrosis and ligamentum flavum thickening. Mild bilateral neural foraminal stenosis. Mild spinal canal stenosis.    L4-5: Mild disc height loss. Loss of disc signal. Disc bulge with annular fissuring. No focal disc herniation. Facet arthrosis and ligamentum flavum thickening. Mild to moderate bilateral neural foraminal stenosis. Mild to moderate spinal canal stenosis.    L5-S1: Normal disc height. Loss of disc  signal. No disc herniation. Facet arthrosis. Mild bilateral neural foraminal stenosis. No significant spinal canal stenosis.      Impression    IMPRESSION:  1.  Recent (acute or subacute) moderate T11 compression fracture with slight retropulsion of fracture fragments contributing to mild spinal canal stenosis, unchanged since 08/30/2024.  2.  Chronic severe L1 compression fracture with retropulsed fracture fragments contributing to mild to moderate spinal canal stenosis, unchanged since 08/30/2024, though demonstrating progressive vertebral body collapse since 01/10/2023.  3.  Stable alignment of chronic mild to moderate L3 superior endplate compression fracture with superimposed Schmorl's node deformity dating back to at least 01/10/2023.  4.  Multilevel lumbar spondylosis.       XR Thoracic Spine 2 Views    Narrative    EXAM: XR THORACIC SPINE 2 VIEWS  LOCATION: [facility]  DATE: 9/1/2024    INDICATION: upright thoracic x ray for T11 fx for baseline  COMPARISON: 1/10/2023, 8/30/2024, 9/1/2024      Impression    IMPRESSION: Accentuation of the thoracic spine kyphosis. Redemonstrated compression deformities at T11, L1, and L3. No new compression fractures are identified. Multilevel degenerative endplate changes with marginal osteophytes. Visualized portions of   the lungs are clear. Atherosclerotic calcification of the aortic arch.       Discharge Medications   Current Discharge Medication List        START taking these medications    Details   melatonin 5 MG tablet Take 1 tablet (5 mg) by mouth nightly as needed for sleep.    Associated Diagnoses: Other insomnia      senna-docusate (SENOKOT-S/PERICOLACE) 8.6-50 MG tablet Take 1 tablet by mouth 2 times daily as needed for constipation.    Associated Diagnoses: Other constipation           CONTINUE these medications which have CHANGED    Details   acetaminophen (TYLENOL) 325 MG tablet Take 2 tablets (650 mg) by mouth every 8 hours as needed  for fever.    Associated Diagnoses: Pain      OLANZapine (ZYPREXA) 2.5 MG tablet Take 1 tablet (2.5 mg) by mouth 2 times daily as needed (agitation).    Associated Diagnoses: Agitation           CONTINUE these medications which have NOT CHANGED    Details   donepezil (ARICEPT) 5 MG tablet Take 5 mg by mouth at bedtime      levothyroxine (SYNTHROID/LEVOTHROID) 112 MCG tablet Take 112 mcg by mouth daily      venlafaxine (EFFEXOR XR) 37.5 MG 24 hr capsule Take 1 capsule by mouth daily           Allergies   No Known Allergies

## 2024-09-06 NOTE — PLAN OF CARE
Physical Therapy Discharge Summary    Reason for therapy discharge:    Discharged to transitional care facility.    Progress towards therapy goal(s). See goals on Care Plan in [facility] electronic health record for goal details.  Goals not met.  Barriers to achieving goals:   limited tolerance for therapy and discharge from facility.    Therapy recommendation(s):    Continued therapy is recommended.  Rationale/Recommendations:  to maximize return towards PLO mobiltiy.    Summary completed from chart review patient not seen by documenting therapist

## 2024-09-06 NOTE — PLAN OF CARE
"PRIMARY DIAGNOSIS: AMS  OUTPATIENT/OBSERVATION GOALS TO BE MET BEFORE DISCHARGE:  ADLs back to baseline: No    Activity and level of assistance: Up with maximum assistance. Consider SW and/or PT evaluation.     Pain status: Pain free.    Return to near baseline physical activity: No     Discharge Planner Nurse   Safe discharge environment identified: Yes  Barriers to discharge: No       Entered by: Velma Roa RN 09/06/2024 11:52 AM   Pt Alert to self only. Tolerating oral intake. Meds with applesauce. Incontinent of bowel and bladder. Total cares due to hx of lewy body dementia. Spouse at bedside. Will discharge [facility] TCU at [facility].   Please review provider order for any additional goals.   Nurse to notify provider when observation goals have been met and patient is ready for discharge.  Problem: Adult Inpatient Plan of Care  Goal: Plan of Care Review  Description: The Plan of Care Review/Shift note should be completed every shift.  The Outcome Evaluation is a brief statement about your assessment that the patient is improving, declining, or no change.  This information will be displayed automatically on your shift  note.  Outcome: Adequate for Care Transition  Goal: Patient-Specific Goal (Individualized)  Description: You can add care plan individualizations to a care plan. Examples of Individualization might be:  \"Parent requests to be called daily at 9am for status\", \"I have a hard time hearing out of my right ear\", or \"Do not touch me to wake me up as it startles  me\".  Outcome: Adequate for Care Transition  Goal: Absence of Hospital-Acquired Illness or Injury  Outcome: Adequate for Care Transition  Intervention: Identify and Manage Fall Risk  Recent Flowsheet Documentation  Taken 9/6/2024 0815 by Velma Roa RN  Safety Promotion/Fall Prevention: activity supervised  Intervention: Prevent Skin Injury  Recent Flowsheet Documentation  Taken 9/6/2024 0815 by Velma Roa RN  Body Position: position " maintained  Skin Protection: adhesive use limited  Device Skin Pressure Protection: adhesive use limited  Intervention: Prevent and Manage VTE (Venous Thromboembolism) Risk  Recent Flowsheet Documentation  Taken 9/6/2024 0815 by Velma Roa RN  VTE Prevention/Management: SCDs off (sequential compression devices)  Intervention: Prevent Infection  Recent Flowsheet Documentation  Taken 9/6/2024 0815 by Velma Roa RN  Infection Prevention: single patient room provided  Goal: Optimal Comfort and Wellbeing  Outcome: Adequate for Care Transition  Goal: Readiness for Transition of Care  Outcome: Adequate for Care Transition

## 2024-09-06 NOTE — PROGRESS NOTES
Care Management Discharge Note    Discharge Date: 09/06/2024     Discharge Disposition: [facility] TCU    Discharge Services: PT/OT    Discharge Transportation: Agency    Private pay costs discussed: transportation costs    Does the patient's insurance plan have a 3 day qualifying hospital stay waiver?  No    PAS Confirmation Code: [id number]  Patient/family educated on Medicare website which has current facility and service quality ratings: Yes    Education Provided on the Discharge Plan: Yes  Persons Notified of Discharge Plans: Patient, spouse, [facility] admissions  Patient/Family in Agreement with the Plan: Yes    Handoff Referral Completed: No, handoff not indicated or clinically appropriate    Additional Information:   met with patient and spouse at bedside while OT present. Patient and spouse agreeable to TCU placement. Discussed insurance coverage. He is agreeable that patient may need a higher level of care vs increased services at home pending pt's progress with therapy. PAS Completed. [facility] transport to be arranged, cost discussed. Call placed to [facility] to schedule transport, booked out past 11pm and unable to give a transport time. They will work with their supervisor to get this arranged and return call to  with a transport time.     1200: Call from [facility] transport, they will transport patient to TCU around 1300. Facility and spouse updated.     AZALIA Caro, [facility]   Inpatient Care Coordination  [facility]   [phone]

## 2024-09-07 NOTE — TELEPHONE ENCOUNTER
Nursing called as they are doing the 24 hour admission vitals and Elsa's pulse is in the 50's.  Not on B/P medications and when nurse looks back it appears that her pulses run in the 50's.    Informed her that that is not bad.  More worried if below 40.  She just wanted confirmation it was ok.    Vane Roa, LEONARDA CNP

## 2024-09-09 NOTE — PROGRESS NOTES
[facility] GERIATRICS    PRIMARY CARE PROVIDER AND CLINIC:  Noe Kowalski MD, [address]  / [place] [address]  Chief Complaint   Patient presents with    Hospital F/U      [place] Medical Record Number:  [id number]  Place of Service where encounter took place:  [facility]  (U) [[id number]]    Elsa Jackson  is a 74 year old  (1950), admitted to the above facility from  [facility]. Hospital stay 8/30/24 through 9/6/24..     By chart review, patient was admitted to [facility] 8/30-9/6/24 for emergency placement. In ED, she had a fever 100.8. Infectious workup including UA, respiratory and enteric panels, CT of the chest and blood cultures were negative for related findings. CT of the spine showed acute/subacute moderate T11 compression fracture, chronic severe L1 compression, and chronic L3 superior endplate compression fracture. Cr was elevated at 1.26. She was noted to have agitation and hallucinations and received dilaudid and ativan with subsequent sedation, transitioned to olanzapine. JANETTE improved with IVF. She was seen by NSY and MRI re demonstrated CT findings; recommended NWB >5-10 lbs, follow-up outpatient. Palliative care was consulted and goals of care reported to be restorative, with DNR/DNI code status. She was recommended TCU at discharge, admitted to current facility for ongoing medical management, rehab, nursing care.    HPI:    Seen today in her room in TCU. She is resting abed mid morning, opens her eyes briefly to stimulation. She mouths some responses which are unintelligible. Per nursing, patient has been wandering in the hallways and intermittently agitated and somnolent since admission.     CODE STATUS/ADVANCE DIRECTIVES DISCUSSION:  No CPR- Do NOT Intubate  DNR / DNI  ALLERGIES:   Allergies   Allergen Reactions    Epinephrine Palpitations      PAST MEDICAL HISTORY:   Past Medical History:   Diagnosis Date    Chronic kidney disease, stage II  "(mild)     Hypothyroidism     L1 vertebral fracture (H)     Lewy body dementia (H)       PAST SURGICAL HISTORY:   has no past surgical history on file.  FAMILY HISTORY: family history includes Heart Disease in her father.  SOCIAL HISTORY:   reports that she has never smoked. She does not have any smokeless tobacco history on file. She reports that she does not currently use alcohol.  Patient's living condition: lives with spouse    Post Discharge Medication Reconciliation Status:   MED REC REQUIRED  Post Medication Reconciliation Status: discharge medications reconciled and changed, per note/orders       Current Outpatient Medications   Medication Sig Dispense Refill    acetaminophen (TYLENOL) 325 MG tablet Take 2 tablets (650 mg) by mouth every 8 hours as needed for fever.      donepezil (ARICEPT) 5 MG tablet Take 5 mg by mouth at bedtime      levothyroxine (SYNTHROID/LEVOTHROID) 112 MCG tablet Take 112 mcg by mouth daily      melatonin 5 MG tablet Take 1 tablet (5 mg) by mouth nightly as needed for sleep.      OLANZapine (ZYPREXA) 2.5 MG tablet Take 1 tablet (2.5 mg) by mouth 2 times daily as needed (agitation).      senna-docusate (SENOKOT-S/PERICOLACE) 8.6-50 MG tablet Take 1 tablet by mouth 2 times daily as needed for constipation.      venlafaxine (EFFEXOR XR) 37.5 MG 24 hr capsule Take 1 capsule by mouth daily       No current facility-administered medications for this visit.       ROS:  Unobtainable secondary to cognitive impairment.     Vitals:  /68   Pulse 62   Temp 97.1  F (36.2  C)   Resp 18   Ht 1.676 m (5' 6\")   Wt 51.7 kg (114 lb)   SpO2 97%   BMI 18.40 kg/m    Exam:  GENERAL APPEARANCE:  thin, very frail appearing  RESP:  respiratory effort and palpation of chest normal, lungs clear to auscultation , no respiratory distress  CV:  regular rate and rhythm, no murmur, rub, or gallop, no edema  ABDOMEN:  normal bowel sounds, soft, nontender, no hepatosplenomegaly or other masses  M/S:   Gait " and station abnormal transfers with assist  SKIN:  Inspection of skin and subcutaneous tissue baseline, Palpation of skin and subcutaneous tissue baseline  NEURO:   espinal spontaneously, somnolent  PSYCH:  NANCI    Lab/Diagnostic data:  Labs done in SNF are in [facility]. Please refer to them using [facility] Everywhere. and Recent labs in [facility] reviewed by me today.     ASSESSMENT/PLAN:    (G31.83,  F02.B2) Moderate Lewy body dementia with psychotic disturbance (H)  (primary encounter diagnosis)  (R40.0) Somnolence  Comment: chronic, progressive decline with hallucinations and aggressive behavior at times. Mentation waxed and waned inpatient, and continues in TCU. Likely needs higher level of care at discharge. OT to follow for formal cognitive testing, SW following for discharge planning.  Plan: PT/OT. SNF for assist with ADLs, medication management, meals, activities. Continue aricept 5 mg at bedtime, PRN zyprexa.       (W19.XXXD) Fall, subsequent encounter  (S22.080D) Compression fracture of T11 vertebra with routine healing, subsequent encounter acute vs subacute  (S32.010D) Compression fracture of L1 vertebra with routine healing, subsequent encounter - chronic  (S32.030D) Closed compression fracture of L3 lumbar vertebra, with routine healing, subsequent encounter - chronic  Comment: chronic recurrent falls with fractures noted on imaging. She appears comfortable today  Plan: fall precautions. Tylenol PRN for pain. NO lifting >5-10 lbs. Follow-up with NSY as directed    (R50.9) Fever, unspecified fever cause  Comment: noted inpatient, extensive infectious workup negative to date.  Plan: monitor for s/sx infection, fever curve. Repeat CBC    (N17.9,  N18.9) Acute kidney injury superimposed on CKD  (H24)  Comment: acute, resolving. Baseline Cr ~1  Plan: Avoid nephrotoxins. Renally dose medications as appropriate. Monitor BMP - repeat 9/10      (E03.9) Hypothyroidism, unspecified type  Comment: chronic  Lab Results    Component Value Date    TSH 3.49 08/30/2024   Plan: continue PTA synthroid    (R00.1) Bradycardia  Comment: Noted in TCU, HR 52-77 since admission. EKG 8/30/24 with NSR 65 BPM.   Plan: monitor HR, symptoms    (D64.9) Acute anemia  Comment: hgb trended down on admit 11>9.1. Suspect some component of hemodilution, baseline hgb ~11  Plan: monitor for s/sx bleeding, transfuse PRN for hgb <7, monitor hgb periodically, repeat CBC 9/10    (K21.9) Chronic gastroesophageal reflux disease  Comment: by history, not on medications  Plan: monitor symptoms    (F32.A) Depression, unspecified depression type  (G47.00) Insomnia, unspecified type  Comment: by history  Plan: monitor symptoms, continue effexor 37.5 mg daily, melatonin 5 mg at bedtime PRN    (K59.01) Slow transit constipation  Comment: ongoing, immobility contributing  Plan: continue bowel regimen, bowel habit monitoring per nursing    (R53.81) Physical deconditioning  Comment: acute on chronic, related to acute and chronic conditions as above, recent hospitalization. Management reviewed with PT on site today  Plan: PT/OT. SNF as above        Orders:  CBC, BMP 9/10    Total time spent with patient visit at the skilled nursing facility was 47 min including patient visit and review of past records and review of management with nursing facility staff.       Electronically signed by:  LEONARDA Sanchez CNP

## 2024-09-09 NOTE — LETTER
9/9/2024      Elsa Jackson  [address]  [place] [facility] [address]        [facility] GERIATRICS    PRIMARY CARE PROVIDER AND CLINIC:  Noe Kowalski MD, [address] [address]  / [place] [address]  Chief Complaint   Patient presents with     Hospital F/U      [place] Medical Record Number:  [id number]  Place of Service where encounter took place:  [facility]  (U) [[id number]]    Elsa Jackson  is a 74 year old  (1950), admitted to the above facility from  [facility]. Hospital stay 8/30/24 through 9/6/24..     By chart review, patient was admitted to [facility] 8/30-9/6/24 for emergency placement. In ED, she had a fever 100.8. Infectious workup including UA, respiratory and enteric panels, CT of the chest and blood cultures were negative for related findings. CT of the spine showed acute/subacute moderate T11 compression fracture, chronic severe L1 compression, and chronic L3 superior endplate compression fracture. Cr was elevated at 1.26. She was noted to have agitation and hallucinations and received dilaudid and ativan with subsequent sedation, transitioned to olanzapine. JANETTE improved with IVF. She was seen by NSY and MRI re demonstrated CT findings; recommended NWB >5-10 lbs, follow-up outpatient. Palliative care was consulted and goals of care reported to be restorative, with DNR/DNI code status. She was recommended TCU at discharge, admitted to current facility for ongoing medical management, rehab, nursing care.    HPI:    Seen today in her room in TCU. She is resting abed mid morning, opens her eyes briefly to stimulation. She mouths some responses which are unintelligible. Per nursing, patient has been wandering in the hallways and intermittently agitated and somnolent since admission.     CODE STATUS/ADVANCE DIRECTIVES DISCUSSION:  No CPR- Do NOT Intubate  DNR / DNI  ALLERGIES:   Allergies   Allergen Reactions     Epinephrine Palpitations      PAST MEDICAL  "HISTORY:   Past Medical History:   Diagnosis Date     Chronic kidney disease, stage II (mild)      Hypothyroidism      L1 vertebral fracture (H)      Lewy body dementia (H)       PAST SURGICAL HISTORY:   has no past surgical history on file.  FAMILY HISTORY: family history includes Heart Disease in her father.  SOCIAL HISTORY:   reports that she has never smoked. She does not have any smokeless tobacco history on file. She reports that she does not currently use alcohol.  Patient's living condition: lives with spouse    Post Discharge Medication Reconciliation Status:   MED REC REQUIRED  Post Medication Reconciliation Status: discharge medications reconciled and changed, per note/orders       Current Outpatient Medications   Medication Sig Dispense Refill     acetaminophen (TYLENOL) 325 MG tablet Take 2 tablets (650 mg) by mouth every 8 hours as needed for fever.       donepezil (ARICEPT) 5 MG tablet Take 5 mg by mouth at bedtime       levothyroxine (SYNTHROID/LEVOTHROID) 112 MCG tablet Take 112 mcg by mouth daily       melatonin 5 MG tablet Take 1 tablet (5 mg) by mouth nightly as needed for sleep.       OLANZapine (ZYPREXA) 2.5 MG tablet Take 1 tablet (2.5 mg) by mouth 2 times daily as needed (agitation).       senna-docusate (SENOKOT-S/PERICOLACE) 8.6-50 MG tablet Take 1 tablet by mouth 2 times daily as needed for constipation.       venlafaxine (EFFEXOR XR) 37.5 MG 24 hr capsule Take 1 capsule by mouth daily       No current facility-administered medications for this visit.       ROS:  Unobtainable secondary to cognitive impairment.     Vitals:  /68   Pulse 62   Temp 97.1  F (36.2  C)   Resp 18   Ht 1.676 m (5' 6\")   Wt 51.7 kg (114 lb)   SpO2 97%   BMI 18.40 kg/m    Exam:  GENERAL APPEARANCE:  thin, very frail appearing  RESP:  respiratory effort and palpation of chest normal, lungs clear to auscultation , no respiratory distress  CV:  regular rate and rhythm, no murmur, rub, or gallop, no " edema  ABDOMEN:  normal bowel sounds, soft, nontender, no hepatosplenomegaly or other masses  M/S:   Gait and station abnormal transfers with assist  SKIN:  Inspection of skin and subcutaneous tissue baseline, Palpation of skin and subcutaneous tissue baseline  NEURO:   espinal spontaneously, somnolent  PSYCH:  NANCI    Lab/Diagnostic data:  Labs done in SNF are in [facility]. Please refer to them using [employer] Everywhere. and Recent labs in [facility] reviewed by me today.     ASSESSMENT/PLAN:    (G31.83,  F02.B2) Moderate Lewy body dementia with psychotic disturbance (H)  (primary encounter diagnosis)  (R40.0) Somnolence  Comment: chronic, progressive decline with hallucinations and aggressive behavior at times. Mentation waxed and waned inpatient, and continues in TCU. Likely needs higher level of care at discharge. OT to follow for formal cognitive testing, SW following for discharge planning.  Plan: PT/OT. SNF for assist with ADLs, medication management, meals, activities. Continue aricept 5 mg at bedtime, PRN zyprexa.       (W19.XXXD) Fall, subsequent encounter  (S22.080D) Compression fracture of T11 vertebra with routine healing, subsequent encounter acute vs subacute  (S32.010D) Compression fracture of L1 vertebra with routine healing, subsequent encounter - chronic  (S32.030D) Closed compression fracture of L3 lumbar vertebra, with routine healing, subsequent encounter - chronic  Comment: chronic recurrent falls with fractures noted on imaging. She appears comfortable today  Plan: fall precautions. Tylenol PRN for pain. NO lifting >5-10 lbs. Follow-up with NSY as directed    (R50.9) Fever, unspecified fever cause  Comment: noted inpatient, extensive infectious workup negative to date.  Plan: monitor for s/sx infection, fever curve. Repeat CBC    (N17.9,  N18.9) Acute kidney injury superimposed on CKD  (H24)  Comment: acute, resolving. Baseline Cr ~1  Plan: Avoid nephrotoxins. Renally dose medications as  appropriate. Monitor BMP - repeat 9/10      (E03.9) Hypothyroidism, unspecified type  Comment: chronic  Lab Results   Component Value Date    TSH 3.49 08/30/2024   Plan: continue PTA synthroid    (R00.1) Bradycardia  Comment: Noted in TCU, HR 52-77 since admission. EKG 8/30/24 with NSR 65 BPM.   Plan: monitor HR, symptoms    (D64.9) Acute anemia  Comment: hgb trended down on admit 11>9.1. Suspect some component of hemodilution, baseline hgb ~11  Plan: monitor for s/sx bleeding, transfuse PRN for hgb <7, monitor hgb periodically, repeat CBC 9/10    (K21.9) Chronic gastroesophageal reflux disease  Comment: by history, not on medications  Plan: monitor symptoms    (F32.A) Depression, unspecified depression type  (G47.00) Insomnia, unspecified type  Comment: by history  Plan: monitor symptoms, continue effexor 37.5 mg daily, melatonin 5 mg at bedtime PRN    (K59.01) Slow transit constipation  Comment: ongoing, immobility contributing  Plan: continue bowel regimen, bowel habit monitoring per nursing    (R53.81) Physical deconditioning  Comment: acute on chronic, related to acute and chronic conditions as above, recent hospitalization. Management reviewed with PT on site today  Plan: PT/OT. SNF as above        Orders:  CBC, BMP 9/10    Total time spent with patient visit at the skilled nursing facility was 47 min including patient visit and review of past records and review of management with nursing facility staff.       Electronically signed by:  LEONARDA Sanchez CNP                   Sincerely,        LEONARDA Sanchez CNP

## 2024-09-10 NOTE — PROGRESS NOTES
Sachi Jackson  1950     Add dx malnutrition       Bridgett Mason, APRN CNP on 9/10/2024 at 1:49 PM